# Patient Record
Sex: FEMALE | Race: WHITE | Employment: OTHER | URBAN - METROPOLITAN AREA
[De-identification: names, ages, dates, MRNs, and addresses within clinical notes are randomized per-mention and may not be internally consistent; named-entity substitution may affect disease eponyms.]

---

## 2017-06-26 DIAGNOSIS — L67.8 ABNORMAL FACIAL HAIR: ICD-10-CM

## 2017-06-26 DIAGNOSIS — Z13.220 SCREENING CHOLESTEROL LEVEL: ICD-10-CM

## 2017-06-26 DIAGNOSIS — R23.2 HOT FLASHES: ICD-10-CM

## 2017-06-26 DIAGNOSIS — E55.9 VITAMIN D DEFICIENCY: ICD-10-CM

## 2017-06-26 DIAGNOSIS — R73.01 ABNORMAL FASTING GLUCOSE: ICD-10-CM

## 2017-06-26 DIAGNOSIS — E66.01 MORBID OBESITY WITH BMI OF 40.0-44.9, ADULT (HCC): ICD-10-CM

## 2017-06-26 DIAGNOSIS — R63.5 WEIGHT GAIN: ICD-10-CM

## 2017-06-26 LAB
ALBUMIN SERPL-MCNC: 4.4 G/DL (ref 3.9–4.9)
ALP BLD-CCNC: 87 U/L (ref 40–130)
ALT SERPL-CCNC: 99 U/L (ref 0–33)
ANION GAP SERPL CALCULATED.3IONS-SCNC: 13 MEQ/L (ref 7–13)
AST SERPL-CCNC: 68 U/L (ref 0–35)
BASOPHILS ABSOLUTE: 0.1 K/UL (ref 0–0.2)
BASOPHILS RELATIVE PERCENT: 0.9 %
BILIRUB SERPL-MCNC: 0.4 MG/DL (ref 0–1.2)
BUN BLDV-MCNC: 13 MG/DL (ref 6–20)
CALCIUM SERPL-MCNC: 9.5 MG/DL (ref 8.6–10.2)
CHLORIDE BLD-SCNC: 104 MEQ/L (ref 98–107)
CHOLESTEROL, TOTAL: 102 MG/DL (ref 0–199)
CO2: 25 MEQ/L (ref 22–29)
CORTISOL - AM: 7.2 UG/DL (ref 6.2–19.4)
CREAT SERPL-MCNC: 0.81 MG/DL (ref 0.5–0.9)
EOSINOPHILS ABSOLUTE: 0.2 K/UL (ref 0–0.7)
EOSINOPHILS RELATIVE PERCENT: 2 %
GFR AFRICAN AMERICAN: >60
GFR NON-AFRICAN AMERICAN: >60
GLOBULIN: 2.5 G/DL (ref 2.3–3.5)
GLUCOSE BLD-MCNC: 74 MG/DL (ref 74–109)
HBA1C MFR BLD: 6.2 % (ref 4.8–5.9)
HCT VFR BLD CALC: 46.1 % (ref 37–47)
HDLC SERPL-MCNC: 41 MG/DL (ref 40–59)
HEMOGLOBIN: 15.5 G/DL (ref 12–16)
LDL CHOLESTEROL CALCULATED: 45 MG/DL (ref 0–129)
LYMPHOCYTES ABSOLUTE: 2.7 K/UL (ref 1–4.8)
LYMPHOCYTES RELATIVE PERCENT: 26.4 %
MCH RBC QN AUTO: 30.7 PG (ref 27–31.3)
MCHC RBC AUTO-ENTMCNC: 33.7 % (ref 33–37)
MCV RBC AUTO: 91.4 FL (ref 82–100)
MONOCYTES ABSOLUTE: 0.7 K/UL (ref 0.2–0.8)
MONOCYTES RELATIVE PERCENT: 7.2 %
NEUTROPHILS ABSOLUTE: 6.4 K/UL (ref 1.4–6.5)
NEUTROPHILS RELATIVE PERCENT: 63.5 %
PDW BLD-RTO: 13.7 % (ref 11.5–14.5)
PLATELET # BLD: 259 K/UL (ref 130–400)
POTASSIUM SERPL-SCNC: 4.2 MEQ/L (ref 3.5–5.1)
RBC # BLD: 5.05 M/UL (ref 4.2–5.4)
SODIUM BLD-SCNC: 142 MEQ/L (ref 132–144)
T4 FREE: 0.89 NG/DL (ref 0.93–1.7)
TOTAL PROTEIN: 6.9 G/DL (ref 6.4–8.1)
TRIGL SERPL-MCNC: 82 MG/DL (ref 0–200)
TSH SERPL DL<=0.05 MIU/L-ACNC: 1.69 UIU/ML (ref 0.27–4.2)
VITAMIN D 25-HYDROXY: 15.6 NG/ML (ref 30–100)
WBC # BLD: 10.1 K/UL (ref 4.8–10.8)

## 2017-07-13 ENCOUNTER — OFFICE VISIT (OUTPATIENT)
Dept: OBGYN | Age: 37
End: 2017-07-13

## 2017-07-13 VITALS
HEIGHT: 65 IN | BODY MASS INDEX: 42.65 KG/M2 | DIASTOLIC BLOOD PRESSURE: 88 MMHG | WEIGHT: 256 LBS | SYSTOLIC BLOOD PRESSURE: 126 MMHG

## 2017-07-13 DIAGNOSIS — N94.6 DYSMENORRHEA: ICD-10-CM

## 2017-07-13 DIAGNOSIS — N92.0 MENORRHAGIA WITH REGULAR CYCLE: ICD-10-CM

## 2017-07-13 DIAGNOSIS — Z01.419 WELL WOMAN EXAM WITH ROUTINE GYNECOLOGICAL EXAM: Primary | ICD-10-CM

## 2017-07-13 DIAGNOSIS — Z01.419 PAP SMEAR, AS PART OF ROUTINE GYNECOLOGICAL EXAMINATION: ICD-10-CM

## 2017-07-13 DIAGNOSIS — Z11.51 SCREENING FOR HPV (HUMAN PAPILLOMAVIRUS): ICD-10-CM

## 2017-07-13 LAB
CONTROL: NORMAL
PREGNANCY TEST URINE, POC: NEGATIVE

## 2017-07-13 PROCEDURE — 81025 URINE PREGNANCY TEST: CPT | Performed by: NURSE PRACTITIONER

## 2017-07-13 PROCEDURE — G0101 CA SCREEN;PELVIC/BREAST EXAM: HCPCS | Performed by: NURSE PRACTITIONER

## 2017-07-13 RX ORDER — LEVONORGESTREL / ETHINYL ESTRADIOL AND ETHINYL ESTRADIOL 150-30(84)
1 KIT ORAL DAILY
Qty: 91 TABLET | Refills: 4 | Status: SHIPPED | OUTPATIENT
Start: 2017-07-13 | End: 2019-06-28

## 2017-07-14 LAB — PAP SMEAR: NORMAL

## 2017-07-20 ENCOUNTER — HOSPITAL ENCOUNTER (OUTPATIENT)
Dept: ULTRASOUND IMAGING | Age: 37
Discharge: HOME | End: 2017-07-20
Payer: COMMERCIAL

## 2017-07-20 DIAGNOSIS — R79.89 ABNORMAL THYROID BLOOD TEST: ICD-10-CM

## 2017-07-20 PROCEDURE — 76536 US EXAM OF HEAD AND NECK: CPT

## 2017-07-21 DIAGNOSIS — Z11.51 SCREENING FOR HPV (HUMAN PAPILLOMAVIRUS): ICD-10-CM

## 2017-07-21 DIAGNOSIS — Z01.419 PAP SMEAR, AS PART OF ROUTINE GYNECOLOGICAL EXAMINATION: ICD-10-CM

## 2017-09-04 ENCOUNTER — HOSPITAL ENCOUNTER (EMERGENCY)
Age: 37
Discharge: HOME | End: 2017-09-04
Payer: COMMERCIAL

## 2017-09-04 VITALS
WEIGHT: 260 LBS | RESPIRATION RATE: 18 BRPM | DIASTOLIC BLOOD PRESSURE: 93 MMHG | HEART RATE: 100 BPM | HEIGHT: 66 IN | OXYGEN SATURATION: 98 % | BODY MASS INDEX: 41.78 KG/M2 | SYSTOLIC BLOOD PRESSURE: 152 MMHG | TEMPERATURE: 97.9 F

## 2017-09-04 DIAGNOSIS — J06.9 ACUTE UPPER RESPIRATORY INFECTION: ICD-10-CM

## 2017-09-04 DIAGNOSIS — H65.02 ACUTE SEROUS OTITIS MEDIA OF LEFT EAR, RECURRENCE NOT SPECIFIED: Primary | ICD-10-CM

## 2017-09-04 PROCEDURE — 6370000000 HC RX 637 (ALT 250 FOR IP): Performed by: PHYSICIAN ASSISTANT

## 2017-09-04 PROCEDURE — 6360000002 HC RX W HCPCS: Performed by: PHYSICIAN ASSISTANT

## 2017-09-04 PROCEDURE — 99282 EMERGENCY DEPT VISIT SF MDM: CPT

## 2017-09-04 RX ORDER — AZITHROMYCIN 250 MG/1
TABLET, FILM COATED ORAL
Qty: 1 PACKET | Refills: 0 | Status: SHIPPED | OUTPATIENT
Start: 2017-09-04 | End: 2017-09-14

## 2017-09-04 RX ORDER — AZITHROMYCIN 500 MG/1
500 TABLET, FILM COATED ORAL ONCE
Status: COMPLETED | OUTPATIENT
Start: 2017-09-04 | End: 2017-09-04

## 2017-09-04 RX ORDER — DEXAMETHASONE SODIUM PHOSPHATE 10 MG/ML
10 INJECTION, SOLUTION INTRAMUSCULAR; INTRAVENOUS ONCE
Status: COMPLETED | OUTPATIENT
Start: 2017-09-04 | End: 2017-09-04

## 2017-09-04 RX ORDER — GUAIFENESIN, PSEUDOEPHEDRINE HYDROCHLORIDE 600; 60 MG/1; MG/1
1 TABLET, EXTENDED RELEASE ORAL EVERY 12 HOURS
Qty: 14 TABLET | Refills: 0 | Status: SHIPPED | OUTPATIENT
Start: 2017-09-04 | End: 2017-09-11

## 2017-09-04 RX ADMIN — DEXAMETHASONE SODIUM PHOSPHATE 10 MG: 10 INJECTION INTRAMUSCULAR; INTRAVENOUS at 00:24

## 2017-09-04 RX ADMIN — AZITHROMYCIN 500 MG: 500 TABLET, FILM COATED ORAL at 00:24

## 2017-09-04 ASSESSMENT — ENCOUNTER SYMPTOMS
ALLERGIC/IMMUNOLOGIC NEGATIVE: 1
COUGH: 1
COLOR CHANGE: 0
ABDOMINAL PAIN: 0
RHINORRHEA: 1
DIARRHEA: 0
TROUBLE SWALLOWING: 0
VOMITING: 0
VOICE CHANGE: 0
SORE THROAT: 1
APNEA: 0
EYE PAIN: 0
SHORTNESS OF BREATH: 0
CHEST TIGHTNESS: 0

## 2017-09-04 ASSESSMENT — PAIN DESCRIPTION - ORIENTATION: ORIENTATION: LEFT

## 2017-09-04 ASSESSMENT — PAIN SCALES - GENERAL: PAINLEVEL_OUTOF10: 2

## 2017-09-04 ASSESSMENT — PAIN DESCRIPTION - LOCATION: LOCATION: EAR

## 2017-09-04 ASSESSMENT — PAIN DESCRIPTION - PAIN TYPE: TYPE: ACUTE PAIN

## 2018-03-31 ENCOUNTER — HOSPITAL ENCOUNTER (EMERGENCY)
Age: 38
Discharge: HOME | End: 2018-03-31
Payer: COMMERCIAL

## 2018-03-31 VITALS
TEMPERATURE: 98 F | HEIGHT: 66 IN | HEART RATE: 87 BPM | DIASTOLIC BLOOD PRESSURE: 88 MMHG | RESPIRATION RATE: 18 BRPM | BODY MASS INDEX: 40.18 KG/M2 | SYSTOLIC BLOOD PRESSURE: 157 MMHG | OXYGEN SATURATION: 97 % | WEIGHT: 250 LBS

## 2018-03-31 DIAGNOSIS — S39.012A LUMBOSACRAL STRAIN, INITIAL ENCOUNTER: Primary | ICD-10-CM

## 2018-03-31 PROCEDURE — 99282 EMERGENCY DEPT VISIT SF MDM: CPT

## 2018-03-31 RX ORDER — IBUPROFEN 800 MG/1
800 TABLET ORAL EVERY 6 HOURS PRN
Qty: 120 TABLET | Refills: 1 | Status: SHIPPED | OUTPATIENT
Start: 2018-03-31 | End: 2019-06-28

## 2018-03-31 RX ORDER — CYCLOBENZAPRINE HCL 10 MG
10 TABLET ORAL 3 TIMES DAILY PRN
Qty: 30 TABLET | Refills: 0 | Status: SHIPPED | OUTPATIENT
Start: 2018-03-31 | End: 2018-04-10

## 2018-03-31 ASSESSMENT — ENCOUNTER SYMPTOMS
EYES NEGATIVE: 1
RESPIRATORY NEGATIVE: 1
GASTROINTESTINAL NEGATIVE: 1
BACK PAIN: 1
ALLERGIC/IMMUNOLOGIC NEGATIVE: 1

## 2018-03-31 ASSESSMENT — PAIN SCALES - GENERAL: PAINLEVEL_OUTOF10: 9

## 2018-03-31 ASSESSMENT — PAIN DESCRIPTION - LOCATION: LOCATION: BACK

## 2018-07-18 ENCOUNTER — TELEPHONE (OUTPATIENT)
Dept: OBGYN CLINIC | Age: 38
End: 2018-07-18

## 2018-10-31 RX ORDER — LEVOTHYROXINE SODIUM 0.03 MG/1
25 TABLET ORAL DAILY
Qty: 30 TABLET | Refills: 3 | Status: SHIPPED | OUTPATIENT
Start: 2018-10-31 | End: 2019-06-28 | Stop reason: SDUPTHER

## 2018-10-31 NOTE — TELEPHONE ENCOUNTER
Rx request   Requested Prescriptions     Pending Prescriptions Disp Refills    levothyroxine (SYNTHROID) 25 MCG tablet 30 tablet 3     Sig: Take 1 tablet by mouth daily Empty stomach 30 min before other medications and food     LOV Visit date not found  Next Visit Date:  No future appointments.

## 2019-03-13 DIAGNOSIS — R73.03 PRE-DIABETES: ICD-10-CM

## 2019-03-13 DIAGNOSIS — E66.01 MORBID OBESITY WITH BMI OF 40.0-44.9, ADULT (HCC): ICD-10-CM

## 2019-03-13 DIAGNOSIS — E88.81 INSULIN RESISTANCE: ICD-10-CM

## 2019-03-20 DIAGNOSIS — E88.81 INSULIN RESISTANCE: ICD-10-CM

## 2019-03-20 DIAGNOSIS — R73.03 PRE-DIABETES: ICD-10-CM

## 2019-03-20 DIAGNOSIS — E66.01 MORBID OBESITY WITH BMI OF 40.0-44.9, ADULT (HCC): ICD-10-CM

## 2019-03-20 RX ORDER — LEVOTHYROXINE SODIUM 0.03 MG/1
25 TABLET ORAL DAILY
Qty: 30 TABLET | Refills: 3 | OUTPATIENT
Start: 2019-03-20

## 2019-05-23 RX ORDER — ERGOCALCIFEROL 1.25 MG/1
50000 CAPSULE ORAL WEEKLY
Qty: 16 CAPSULE | Refills: 2 | OUTPATIENT
Start: 2019-05-23

## 2019-05-23 NOTE — TELEPHONE ENCOUNTER
Rx request   Requested Prescriptions     Pending Prescriptions Disp Refills    vitamin D (ERGOCALCIFEROL) 15743 units CAPS capsule 16 capsule 2     Sig: Take 1 capsule by mouth once a week     LOV 7/27/2017  Next Visit Date:  No future appointments.

## 2019-06-28 ENCOUNTER — OFFICE VISIT (OUTPATIENT)
Dept: FAMILY MEDICINE CLINIC | Age: 39
End: 2019-06-28
Payer: COMMERCIAL

## 2019-06-28 VITALS
OXYGEN SATURATION: 98 % | HEIGHT: 66 IN | TEMPERATURE: 97.5 F | SYSTOLIC BLOOD PRESSURE: 118 MMHG | HEART RATE: 90 BPM | BODY MASS INDEX: 40.24 KG/M2 | DIASTOLIC BLOOD PRESSURE: 80 MMHG | RESPIRATION RATE: 16 BRPM | WEIGHT: 250.4 LBS

## 2019-06-28 DIAGNOSIS — Z13.220 NEED FOR LIPID SCREENING: ICD-10-CM

## 2019-06-28 DIAGNOSIS — E55.9 VITAMIN D DEFICIENCY: ICD-10-CM

## 2019-06-28 DIAGNOSIS — G47.00 INSOMNIA, UNSPECIFIED TYPE: ICD-10-CM

## 2019-06-28 DIAGNOSIS — E03.9 HYPOTHYROIDISM, UNSPECIFIED TYPE: ICD-10-CM

## 2019-06-28 DIAGNOSIS — E66.01 MORBID OBESITY WITH BMI OF 40.0-44.9, ADULT (HCC): ICD-10-CM

## 2019-06-28 DIAGNOSIS — R73.03 PRE-DIABETES: ICD-10-CM

## 2019-06-28 DIAGNOSIS — Z00.00 ANNUAL PHYSICAL EXAM: Primary | ICD-10-CM

## 2019-06-28 DIAGNOSIS — E88.81 INSULIN RESISTANCE: ICD-10-CM

## 2019-06-28 PROCEDURE — G8417 CALC BMI ABV UP PARAM F/U: HCPCS | Performed by: NURSE PRACTITIONER

## 2019-06-28 PROCEDURE — 82962 GLUCOSE BLOOD TEST: CPT | Performed by: NURSE PRACTITIONER

## 2019-06-28 PROCEDURE — 1036F TOBACCO NON-USER: CPT | Performed by: NURSE PRACTITIONER

## 2019-06-28 PROCEDURE — G8427 DOCREV CUR MEDS BY ELIG CLIN: HCPCS | Performed by: NURSE PRACTITIONER

## 2019-06-28 PROCEDURE — 99213 OFFICE O/P EST LOW 20 MIN: CPT | Performed by: NURSE PRACTITIONER

## 2019-06-28 RX ORDER — NAPROXEN SODIUM 220 MG
220 TABLET ORAL 2 TIMES DAILY WITH MEALS
Qty: 60 TABLET | Refills: 3 | Status: SHIPPED | OUTPATIENT
Start: 2019-06-28 | End: 2019-09-23

## 2019-06-28 RX ORDER — LEVOTHYROXINE SODIUM 0.03 MG/1
25 TABLET ORAL DAILY
Qty: 30 TABLET | Refills: 3 | Status: SHIPPED | OUTPATIENT
Start: 2019-06-28 | End: 2019-07-02 | Stop reason: SDUPTHER

## 2019-06-28 RX ORDER — TRAZODONE HYDROCHLORIDE 50 MG/1
50 TABLET ORAL NIGHTLY
Qty: 30 TABLET | Refills: 3 | Status: SHIPPED | OUTPATIENT
Start: 2019-06-28 | End: 2019-09-23

## 2019-06-28 RX ORDER — LANCETS 30 GAUGE
EACH MISCELLANEOUS
Qty: 100 EACH | Refills: 3 | Status: SHIPPED | OUTPATIENT
Start: 2019-06-28 | End: 2019-10-09 | Stop reason: SDUPTHER

## 2019-06-28 RX ORDER — GLUCOSAMINE HCL/CHONDROITIN SU 500-400 MG
CAPSULE ORAL
Qty: 100 STRIP | Refills: 3 | Status: SHIPPED | OUTPATIENT
Start: 2019-06-28 | End: 2019-10-09 | Stop reason: SDUPTHER

## 2019-06-28 RX ORDER — UBIQUINOL 100 MG
CAPSULE ORAL
Qty: 100 EACH | Refills: 5 | Status: SHIPPED | OUTPATIENT
Start: 2019-06-28 | End: 2019-10-09 | Stop reason: SDUPTHER

## 2019-06-28 RX ORDER — ERGOCALCIFEROL 1.25 MG/1
50000 CAPSULE ORAL WEEKLY
Qty: 16 CAPSULE | Refills: 2 | Status: SHIPPED | OUTPATIENT
Start: 2019-06-28 | End: 2019-10-09 | Stop reason: SDUPTHER

## 2019-07-01 DIAGNOSIS — R73.03 PRE-DIABETES: ICD-10-CM

## 2019-07-01 DIAGNOSIS — E88.819 INSULIN RESISTANCE: ICD-10-CM

## 2019-07-01 DIAGNOSIS — Z00.00 ANNUAL PHYSICAL EXAM: ICD-10-CM

## 2019-07-01 DIAGNOSIS — E03.9 HYPOTHYROIDISM, UNSPECIFIED TYPE: ICD-10-CM

## 2019-07-01 DIAGNOSIS — Z13.220 NEED FOR LIPID SCREENING: ICD-10-CM

## 2019-07-01 DIAGNOSIS — E55.9 VITAMIN D DEFICIENCY: ICD-10-CM

## 2019-07-01 LAB
ALBUMIN SERPL-MCNC: 4.3 G/DL (ref 3.5–4.6)
ALP BLD-CCNC: 79 U/L (ref 40–130)
ALT SERPL-CCNC: 37 U/L (ref 0–33)
ANION GAP SERPL CALCULATED.3IONS-SCNC: 12 MEQ/L (ref 9–15)
AST SERPL-CCNC: 27 U/L (ref 0–35)
BASOPHILS ABSOLUTE: 0 K/UL (ref 0–0.2)
BASOPHILS RELATIVE PERCENT: 0.4 %
BILIRUB SERPL-MCNC: 0.4 MG/DL (ref 0.2–0.7)
BUN BLDV-MCNC: 12 MG/DL (ref 6–20)
CALCIUM SERPL-MCNC: 9.2 MG/DL (ref 8.5–9.9)
CHLORIDE BLD-SCNC: 106 MEQ/L (ref 95–107)
CHOLESTEROL, FASTING: 80 MG/DL (ref 0–199)
CO2: 27 MEQ/L (ref 20–31)
CREAT SERPL-MCNC: 0.92 MG/DL (ref 0.5–0.9)
EOSINOPHILS ABSOLUTE: 0.1 K/UL (ref 0–0.7)
EOSINOPHILS RELATIVE PERCENT: 1.1 %
GFR AFRICAN AMERICAN: >60
GFR NON-AFRICAN AMERICAN: >60
GLOBULIN: 2.8 G/DL (ref 2.3–3.5)
GLUCOSE BLD-MCNC: 87 MG/DL (ref 70–99)
HBA1C MFR BLD: 5.6 % (ref 4.8–5.9)
HCT VFR BLD CALC: 41.7 % (ref 37–47)
HDLC SERPL-MCNC: 39 MG/DL (ref 40–59)
HEMOGLOBIN: 14.2 G/DL (ref 12–16)
LDL CHOLESTEROL CALCULATED: 32 MG/DL (ref 0–129)
LYMPHOCYTES ABSOLUTE: 2.1 K/UL (ref 1–4.8)
LYMPHOCYTES RELATIVE PERCENT: 27.2 %
MCH RBC QN AUTO: 31.6 PG (ref 27–31.3)
MCHC RBC AUTO-ENTMCNC: 34.1 % (ref 33–37)
MCV RBC AUTO: 92.9 FL (ref 82–100)
MONOCYTES ABSOLUTE: 0.6 K/UL (ref 0.2–0.8)
MONOCYTES RELATIVE PERCENT: 7.2 %
NEUTROPHILS ABSOLUTE: 5 K/UL (ref 1.4–6.5)
NEUTROPHILS RELATIVE PERCENT: 64.1 %
PDW BLD-RTO: 14.1 % (ref 11.5–14.5)
PLATELET # BLD: 267 K/UL (ref 130–400)
POTASSIUM SERPL-SCNC: 4 MEQ/L (ref 3.4–4.9)
RBC # BLD: 4.49 M/UL (ref 4.2–5.4)
SODIUM BLD-SCNC: 145 MEQ/L (ref 135–144)
T4 FREE: 1.08 NG/DL (ref 0.84–1.68)
TOTAL PROTEIN: 7.1 G/DL (ref 6.3–8)
TRIGLYCERIDE, FASTING: 46 MG/DL (ref 0–150)
TSH SERPL DL<=0.05 MIU/L-ACNC: 1.02 UIU/ML (ref 0.44–3.86)
VITAMIN D 25-HYDROXY: 14.1 NG/ML (ref 30–100)
WBC # BLD: 7.8 K/UL (ref 4.8–10.8)

## 2019-07-02 RX ORDER — LEVOTHYROXINE SODIUM 0.03 MG/1
25 TABLET ORAL DAILY
Qty: 30 TABLET | Refills: 3 | Status: SHIPPED | OUTPATIENT
Start: 2019-07-02 | End: 2019-10-09 | Stop reason: SDUPTHER

## 2019-07-03 ASSESSMENT — ENCOUNTER SYMPTOMS
DIARRHEA: 0
CHEST TIGHTNESS: 0
TROUBLE SWALLOWING: 0
SHORTNESS OF BREATH: 0
NAUSEA: 0
VOMITING: 0

## 2019-07-31 ENCOUNTER — OFFICE VISIT (OUTPATIENT)
Dept: FAMILY MEDICINE CLINIC | Age: 39
End: 2019-07-31
Payer: COMMERCIAL

## 2019-07-31 VITALS
HEIGHT: 66 IN | BODY MASS INDEX: 40.47 KG/M2 | OXYGEN SATURATION: 99 % | RESPIRATION RATE: 16 BRPM | SYSTOLIC BLOOD PRESSURE: 120 MMHG | TEMPERATURE: 97.8 F | HEART RATE: 77 BPM | DIASTOLIC BLOOD PRESSURE: 76 MMHG | WEIGHT: 251.8 LBS

## 2019-07-31 DIAGNOSIS — N92.0 MENORRHAGIA WITH REGULAR CYCLE: ICD-10-CM

## 2019-07-31 DIAGNOSIS — G47.19 EXCESSIVE DAYTIME SLEEPINESS: Primary | ICD-10-CM

## 2019-07-31 DIAGNOSIS — G47.00 DIFFICULTY STAYING ASLEEP: ICD-10-CM

## 2019-07-31 DIAGNOSIS — N94.6 DYSMENORRHEA: ICD-10-CM

## 2019-07-31 PROCEDURE — 99213 OFFICE O/P EST LOW 20 MIN: CPT | Performed by: NURSE PRACTITIONER

## 2019-07-31 PROCEDURE — G8427 DOCREV CUR MEDS BY ELIG CLIN: HCPCS | Performed by: NURSE PRACTITIONER

## 2019-07-31 PROCEDURE — G8417 CALC BMI ABV UP PARAM F/U: HCPCS | Performed by: NURSE PRACTITIONER

## 2019-07-31 PROCEDURE — 1036F TOBACCO NON-USER: CPT | Performed by: NURSE PRACTITIONER

## 2019-07-31 RX ORDER — BLOOD-GLUCOSE METER
EACH MISCELLANEOUS
Refills: 0 | COMMUNITY
Start: 2019-06-28 | End: 2019-08-15

## 2019-07-31 RX ORDER — LEVONORGESTREL / ETHINYL ESTRADIOL AND ETHINYL ESTRADIOL 150-30(84)
KIT ORAL
COMMUNITY
Start: 2017-07-13 | End: 2019-09-23

## 2019-07-31 RX ORDER — LANCETS 30 GAUGE
EACH MISCELLANEOUS
COMMUNITY
Start: 2017-07-27 | End: 2019-08-15

## 2019-07-31 RX ORDER — ERGOCALCIFEROL (VITAMIN D2) 1250 MCG
50000 CAPSULE ORAL DAILY
COMMUNITY
Start: 2017-06-27 | End: 2019-07-31

## 2019-07-31 NOTE — PROGRESS NOTES
Medication Dose Route Frequency Provider Last Rate Last Dose    cefTRIAXone (ROCEPHIN) 2 g IVPB in D5W 50ml minibag  2 g Intravenous Once Unknown Provider Result         Past Surgical History:   Procedure Laterality Date    KNEE SURGERY  2016     Family History   Problem Relation Age of Onset    Diabetes Mother     High Blood Pressure Mother     Kidney Disease Mother     Stroke Mother     Diabetes Father     High Blood Pressure Father     Diabetes Sister     High Blood Pressure Sister      Social History     Socioeconomic History    Marital status: Single     Spouse name: Not on file    Number of children: Not on file    Years of education: Not on file    Highest education level: Not on file   Occupational History    Not on file   Social Needs    Financial resource strain: Not on file    Food insecurity:     Worry: Not on file     Inability: Not on file    Transportation needs:     Medical: Not on file     Non-medical: Not on file   Tobacco Use    Smoking status: Never Smoker    Smokeless tobacco: Never Used   Substance and Sexual Activity    Alcohol use: No    Drug use: No    Sexual activity: Never   Lifestyle    Physical activity:     Days per week: Not on file     Minutes per session: Not on file    Stress: Not on file   Relationships    Social connections:     Talks on phone: Not on file     Gets together: Not on file     Attends Spiritism service: Not on file     Active member of club or organization: Not on file     Attends meetings of clubs or organizations: Not on file     Relationship status: Not on file    Intimate partner violence:     Fear of current or ex partner: Not on file     Emotionally abused: Not on file     Physically abused: Not on file     Forced sexual activity: Not on file   Other Topics Concern    Not on file   Social History Narrative    Not on file     Allergies:  Patient has no known allergies.     Review of Systems    Objective:    /76 (Site: Left

## 2019-08-01 ENCOUNTER — HOSPITAL ENCOUNTER (OUTPATIENT)
Dept: SLEEP CENTER | Age: 39
Discharge: HOME | End: 2019-08-03
Admitting: NURSE PRACTITIONER
Payer: COMMERCIAL

## 2019-08-01 PROCEDURE — 95810 POLYSOM 6/> YRS 4/> PARAM: CPT

## 2019-08-01 PROCEDURE — 95810 POLYSOM 6/> YRS 4/> PARAM: CPT | Performed by: INTERNAL MEDICINE

## 2019-08-07 DIAGNOSIS — G47.19 EXCESSIVE DAYTIME SLEEPINESS: ICD-10-CM

## 2019-08-07 DIAGNOSIS — G47.00 DIFFICULTY STAYING ASLEEP: ICD-10-CM

## 2019-08-15 ENCOUNTER — OFFICE VISIT (OUTPATIENT)
Dept: FAMILY MEDICINE CLINIC | Age: 39
End: 2019-08-15
Payer: COMMERCIAL

## 2019-08-15 VITALS
WEIGHT: 258.6 LBS | HEIGHT: 66 IN | RESPIRATION RATE: 16 BRPM | TEMPERATURE: 98.7 F | SYSTOLIC BLOOD PRESSURE: 122 MMHG | OXYGEN SATURATION: 99 % | BODY MASS INDEX: 41.56 KG/M2 | DIASTOLIC BLOOD PRESSURE: 82 MMHG | HEART RATE: 90 BPM

## 2019-08-15 DIAGNOSIS — M54.50 CHRONIC MIDLINE LOW BACK PAIN WITHOUT SCIATICA: ICD-10-CM

## 2019-08-15 DIAGNOSIS — G89.29 CHRONIC MIDLINE LOW BACK PAIN WITHOUT SCIATICA: ICD-10-CM

## 2019-08-15 DIAGNOSIS — N94.6 DYSMENORRHEA: ICD-10-CM

## 2019-08-15 DIAGNOSIS — N92.0 MENORRHAGIA WITH REGULAR CYCLE: ICD-10-CM

## 2019-08-15 DIAGNOSIS — E66.01 CLASS 3 SEVERE OBESITY DUE TO EXCESS CALORIES WITHOUT SERIOUS COMORBIDITY WITH BODY MASS INDEX (BMI) OF 40.0 TO 44.9 IN ADULT (HCC): Primary | ICD-10-CM

## 2019-08-15 DIAGNOSIS — G47.34 OXYGEN DESATURATION DURING SLEEP: ICD-10-CM

## 2019-08-15 DIAGNOSIS — R53.82 CHRONIC FATIGUE: ICD-10-CM

## 2019-08-15 DIAGNOSIS — E88.81 INSULIN RESISTANCE: ICD-10-CM

## 2019-08-15 PROCEDURE — G8428 CUR MEDS NOT DOCUMENT: HCPCS | Performed by: PHYSICIAN ASSISTANT

## 2019-08-15 PROCEDURE — G8417 CALC BMI ABV UP PARAM F/U: HCPCS | Performed by: PHYSICIAN ASSISTANT

## 2019-08-15 PROCEDURE — 1036F TOBACCO NON-USER: CPT | Performed by: PHYSICIAN ASSISTANT

## 2019-08-15 PROCEDURE — 99214 OFFICE O/P EST MOD 30 MIN: CPT | Performed by: PHYSICIAN ASSISTANT

## 2019-08-15 ASSESSMENT — ENCOUNTER SYMPTOMS
ABDOMINAL DISTENTION: 0
DIARRHEA: 0
CHEST TIGHTNESS: 0
ABDOMINAL PAIN: 0
VOMITING: 0
TROUBLE SWALLOWING: 0
NAUSEA: 0
SHORTNESS OF BREATH: 0

## 2019-08-15 ASSESSMENT — PATIENT HEALTH QUESTIONNAIRE - PHQ9
SUM OF ALL RESPONSES TO PHQ QUESTIONS 1-9: 0
SUM OF ALL RESPONSES TO PHQ QUESTIONS 1-9: 0
SUM OF ALL RESPONSES TO PHQ9 QUESTIONS 1 & 2: 0
2. FEELING DOWN, DEPRESSED OR HOPELESS: 0
1. LITTLE INTEREST OR PLEASURE IN DOING THINGS: 0

## 2019-09-11 ENCOUNTER — HOSPITAL ENCOUNTER (EMERGENCY)
Age: 39
Discharge: HOME | End: 2019-09-11
Payer: COMMERCIAL

## 2019-09-11 ENCOUNTER — APPOINTMENT (OUTPATIENT)
Dept: GENERAL RADIOLOGY | Age: 39
End: 2019-09-11
Payer: COMMERCIAL

## 2019-09-11 VITALS
BODY MASS INDEX: 41.14 KG/M2 | SYSTOLIC BLOOD PRESSURE: 119 MMHG | WEIGHT: 256 LBS | TEMPERATURE: 97.2 F | OXYGEN SATURATION: 95 % | DIASTOLIC BLOOD PRESSURE: 78 MMHG | RESPIRATION RATE: 18 BRPM | HEIGHT: 66 IN | HEART RATE: 75 BPM

## 2019-09-11 DIAGNOSIS — R07.89 ATYPICAL CHEST PAIN: Primary | ICD-10-CM

## 2019-09-11 DIAGNOSIS — E86.0 DEHYDRATION: ICD-10-CM

## 2019-09-11 LAB
ALBUMIN SERPL-MCNC: 4.4 G/DL (ref 3.5–4.6)
ALP BLD-CCNC: 77 U/L (ref 40–130)
ALT SERPL-CCNC: 31 U/L (ref 0–33)
ANION GAP SERPL CALCULATED.3IONS-SCNC: 12 MEQ/L (ref 9–15)
AST SERPL-CCNC: 19 U/L (ref 0–35)
BASOPHILS ABSOLUTE: 0.1 K/UL (ref 0–0.2)
BASOPHILS RELATIVE PERCENT: 0.6 %
BILIRUB SERPL-MCNC: 0.3 MG/DL (ref 0.2–0.7)
BUN BLDV-MCNC: 27 MG/DL (ref 6–20)
CALCIUM SERPL-MCNC: 9.2 MG/DL (ref 8.5–9.9)
CHLORIDE BLD-SCNC: 108 MEQ/L (ref 95–107)
CO2: 22 MEQ/L (ref 20–31)
CREAT SERPL-MCNC: 0.85 MG/DL (ref 0.5–0.9)
D DIMER: 0.42 MG/L FEU (ref 0–0.5)
EKG ATRIAL RATE: 82 BPM
EKG P AXIS: 54 DEGREES
EKG P-R INTERVAL: 150 MS
EKG Q-T INTERVAL: 388 MS
EKG QRS DURATION: 90 MS
EKG QTC CALCULATION (BAZETT): 453 MS
EKG R AXIS: 64 DEGREES
EKG T AXIS: 42 DEGREES
EKG VENTRICULAR RATE: 82 BPM
EOSINOPHILS ABSOLUTE: 0.1 K/UL (ref 0–0.7)
EOSINOPHILS RELATIVE PERCENT: 0.7 %
GFR AFRICAN AMERICAN: >60
GFR NON-AFRICAN AMERICAN: >60
GLOBULIN: 3 G/DL (ref 2.3–3.5)
GLUCOSE BLD-MCNC: 117 MG/DL (ref 70–99)
HCT VFR BLD CALC: 44.1 % (ref 37–47)
HEMOGLOBIN: 14.5 G/DL (ref 12–16)
LACTIC ACID: 1.2 MMOL/L (ref 0.5–2.2)
LYMPHOCYTES ABSOLUTE: 2.8 K/UL (ref 1–4.8)
LYMPHOCYTES RELATIVE PERCENT: 27.9 %
MAGNESIUM: 2.1 MG/DL (ref 1.7–2.4)
MCH RBC QN AUTO: 30.9 PG (ref 27–31.3)
MCHC RBC AUTO-ENTMCNC: 33 % (ref 33–37)
MCV RBC AUTO: 93.9 FL (ref 82–100)
MONOCYTES ABSOLUTE: 0.8 K/UL (ref 0.2–0.8)
MONOCYTES RELATIVE PERCENT: 7.7 %
NEUTROPHILS ABSOLUTE: 6.2 K/UL (ref 1.4–6.5)
NEUTROPHILS RELATIVE PERCENT: 63.1 %
PDW BLD-RTO: 13.9 % (ref 11.5–14.5)
PLATELET # BLD: 245 K/UL (ref 130–400)
POTASSIUM SERPL-SCNC: 3.8 MEQ/L (ref 3.4–4.9)
PRO-BNP: 64 PG/ML
RBC # BLD: 4.7 M/UL (ref 4.2–5.4)
SODIUM BLD-SCNC: 142 MEQ/L (ref 135–144)
TOTAL CK: 38 U/L (ref 0–170)
TOTAL CK: 39 U/L (ref 0–170)
TOTAL PROTEIN: 7.4 G/DL (ref 6.3–8)
TROPONIN: <0.01 NG/ML (ref 0–0.01)
TROPONIN: <0.01 NG/ML (ref 0–0.01)
WBC # BLD: 9.9 K/UL (ref 4.8–10.8)

## 2019-09-11 PROCEDURE — 96361 HYDRATE IV INFUSION ADD-ON: CPT

## 2019-09-11 PROCEDURE — 71046 X-RAY EXAM CHEST 2 VIEWS: CPT

## 2019-09-11 PROCEDURE — 82550 ASSAY OF CK (CPK): CPT

## 2019-09-11 PROCEDURE — 83735 ASSAY OF MAGNESIUM: CPT

## 2019-09-11 PROCEDURE — 85025 COMPLETE CBC W/AUTO DIFF WBC: CPT

## 2019-09-11 PROCEDURE — 96375 TX/PRO/DX INJ NEW DRUG ADDON: CPT

## 2019-09-11 PROCEDURE — 2580000003 HC RX 258: Performed by: PHYSICIAN ASSISTANT

## 2019-09-11 PROCEDURE — 94640 AIRWAY INHALATION TREATMENT: CPT

## 2019-09-11 PROCEDURE — 85379 FIBRIN DEGRADATION QUANT: CPT

## 2019-09-11 PROCEDURE — 93005 ELECTROCARDIOGRAM TRACING: CPT | Performed by: PHYSICIAN ASSISTANT

## 2019-09-11 PROCEDURE — 6360000002 HC RX W HCPCS: Performed by: PHYSICIAN ASSISTANT

## 2019-09-11 PROCEDURE — 83880 ASSAY OF NATRIURETIC PEPTIDE: CPT

## 2019-09-11 PROCEDURE — 84484 ASSAY OF TROPONIN QUANT: CPT

## 2019-09-11 PROCEDURE — 6370000000 HC RX 637 (ALT 250 FOR IP): Performed by: PHYSICIAN ASSISTANT

## 2019-09-11 PROCEDURE — 96374 THER/PROPH/DIAG INJ IV PUSH: CPT

## 2019-09-11 PROCEDURE — 99284 EMERGENCY DEPT VISIT MOD MDM: CPT

## 2019-09-11 PROCEDURE — 36415 COLL VENOUS BLD VENIPUNCTURE: CPT

## 2019-09-11 PROCEDURE — 80053 COMPREHEN METABOLIC PANEL: CPT

## 2019-09-11 PROCEDURE — 83605 ASSAY OF LACTIC ACID: CPT

## 2019-09-11 RX ORDER — 0.9 % SODIUM CHLORIDE 0.9 %
1000 INTRAVENOUS SOLUTION INTRAVENOUS ONCE
Status: COMPLETED | OUTPATIENT
Start: 2019-09-11 | End: 2019-09-11

## 2019-09-11 RX ORDER — METHYLPREDNISOLONE SODIUM SUCCINATE 125 MG/2ML
125 INJECTION, POWDER, LYOPHILIZED, FOR SOLUTION INTRAMUSCULAR; INTRAVENOUS ONCE
Status: COMPLETED | OUTPATIENT
Start: 2019-09-11 | End: 2019-09-11

## 2019-09-11 RX ORDER — KETOROLAC TROMETHAMINE 30 MG/ML
30 INJECTION, SOLUTION INTRAMUSCULAR; INTRAVENOUS ONCE
Status: COMPLETED | OUTPATIENT
Start: 2019-09-11 | End: 2019-09-11

## 2019-09-11 RX ORDER — ETODOLAC 400 MG/1
400 TABLET, FILM COATED ORAL 2 TIMES DAILY
Qty: 14 TABLET | Refills: 0 | Status: SHIPPED | OUTPATIENT
Start: 2019-09-11 | End: 2019-09-23

## 2019-09-11 RX ORDER — IPRATROPIUM BROMIDE AND ALBUTEROL SULFATE 2.5; .5 MG/3ML; MG/3ML
1 SOLUTION RESPIRATORY (INHALATION) ONCE
Status: COMPLETED | OUTPATIENT
Start: 2019-09-11 | End: 2019-09-11

## 2019-09-11 RX ADMIN — KETOROLAC TROMETHAMINE 30 MG: 30 INJECTION, SOLUTION INTRAMUSCULAR at 21:16

## 2019-09-11 RX ADMIN — METHYLPREDNISOLONE SODIUM SUCCINATE 125 MG: 125 INJECTION, POWDER, FOR SOLUTION INTRAMUSCULAR; INTRAVENOUS at 21:16

## 2019-09-11 RX ADMIN — SODIUM CHLORIDE 1000 ML: 9 INJECTION, SOLUTION INTRAVENOUS at 21:16

## 2019-09-11 RX ADMIN — IPRATROPIUM BROMIDE AND ALBUTEROL SULFATE 1 AMPULE: .5; 3 SOLUTION RESPIRATORY (INHALATION) at 19:00

## 2019-09-11 ASSESSMENT — ENCOUNTER SYMPTOMS
EYE PAIN: 0
ALLERGIC/IMMUNOLOGIC NEGATIVE: 1
COLOR CHANGE: 0
APNEA: 0
SHORTNESS OF BREATH: 1
CHEST TIGHTNESS: 1
ABDOMINAL PAIN: 0
TROUBLE SWALLOWING: 0

## 2019-09-11 ASSESSMENT — PAIN SCALES - GENERAL: PAINLEVEL_OUTOF10: 6

## 2019-09-11 NOTE — ED PROVIDER NOTES
distress. She has decreased breath sounds in the right middle field, the right lower field, the left middle field and the left lower field. She exhibits tenderness. Abdominal: Soft. Bowel sounds are normal. She exhibits no distension. Musculoskeletal: Normal range of motion. Neurological: She is alert and oriented to person, place, and time. No cranial nerve deficit. She exhibits normal muscle tone. Skin: Skin is warm and dry. No rash noted. She is not diaphoretic. No erythema. Psychiatric: She has a normal mood and affect. Her behavior is normal. Judgment and thought content normal.   Nursing note and vitals reviewed. DIAGNOSTIC RESULTS     EKG: All EKG's are interpreted by the Emergency Department Physician who either signs or Co-signsthis chart in the absence of a cardiologist.    NSR @ 82, no st elevation    RADIOLOGY:   Non-plain filmimages such as CT, Ultrasound and MRI are read by the radiologist. Plain radiographic images are visualized and preliminarily interpreted by the emergency physician with the below findings:    NAD    Interpretation per the Radiologist below, if available at the time ofthis note:    XR CHEST STANDARD (2 VW)    (Results Pending)         ED BEDSIDE ULTRASOUND:   Performed by ED Physician - none    LABS:  Labs Reviewed   COMPREHENSIVE METABOLIC PANEL - Abnormal; Notable for the following components:       Result Value    Chloride 108 (*)     Glucose 117 (*)     BUN 27 (*)     All other components within normal limits   CBC WITH AUTO DIFFERENTIAL   TROPONIN   LACTIC ACID, PLASMA   MAGNESIUM   CK   BRAIN NATRIURETIC PEPTIDE   D-DIMER, QUANTITATIVE   TROPONIN   CK       All other labs were within normal range or not returned as of this dictation.     EMERGENCY DEPARTMENT COURSE and DIFFERENTIAL DIAGNOSIS/MDM:   Vitals:    Vitals:    09/11/19 1835 09/11/19 1900 09/11/19 2007 09/11/19 2200   BP: (!) 151/89  118/86 119/78   Pulse: 88  88 75   Resp: 18 18 21 18   Temp: 97.2

## 2019-09-12 PROCEDURE — 93010 ELECTROCARDIOGRAM REPORT: CPT | Performed by: INTERNAL MEDICINE

## 2019-09-23 ENCOUNTER — OFFICE VISIT (OUTPATIENT)
Dept: FAMILY MEDICINE CLINIC | Age: 39
End: 2019-09-23
Payer: COMMERCIAL

## 2019-09-23 VITALS
TEMPERATURE: 98.3 F | SYSTOLIC BLOOD PRESSURE: 110 MMHG | WEIGHT: 250.3 LBS | BODY MASS INDEX: 40.22 KG/M2 | HEART RATE: 87 BPM | OXYGEN SATURATION: 100 % | RESPIRATION RATE: 16 BRPM | DIASTOLIC BLOOD PRESSURE: 80 MMHG | HEIGHT: 66 IN

## 2019-09-23 DIAGNOSIS — R06.02 SOB (SHORTNESS OF BREATH) ON EXERTION: Primary | ICD-10-CM

## 2019-09-23 PROCEDURE — 1036F TOBACCO NON-USER: CPT | Performed by: PHYSICIAN ASSISTANT

## 2019-09-23 PROCEDURE — G8417 CALC BMI ABV UP PARAM F/U: HCPCS | Performed by: PHYSICIAN ASSISTANT

## 2019-09-23 PROCEDURE — G8427 DOCREV CUR MEDS BY ELIG CLIN: HCPCS | Performed by: PHYSICIAN ASSISTANT

## 2019-09-23 PROCEDURE — 99214 OFFICE O/P EST MOD 30 MIN: CPT | Performed by: PHYSICIAN ASSISTANT

## 2019-09-23 RX ORDER — ALBUTEROL SULFATE 90 UG/1
2 AEROSOL, METERED RESPIRATORY (INHALATION) EVERY 4 HOURS PRN
Qty: 2 INHALER | Refills: 3 | Status: SHIPPED | OUTPATIENT
Start: 2019-09-23 | End: 2019-10-09 | Stop reason: SDUPTHER

## 2019-09-23 ASSESSMENT — ENCOUNTER SYMPTOMS
SHORTNESS OF BREATH: 1
CHEST TIGHTNESS: 1
COUGH: 1
WHEEZING: 0

## 2019-10-07 DIAGNOSIS — R06.02 SOB (SHORTNESS OF BREATH) ON EXERTION: ICD-10-CM

## 2019-10-09 DIAGNOSIS — E88.81 INSULIN RESISTANCE: ICD-10-CM

## 2019-10-09 DIAGNOSIS — R06.02 SOB (SHORTNESS OF BREATH) ON EXERTION: ICD-10-CM

## 2019-10-09 DIAGNOSIS — R73.03 PRE-DIABETES: ICD-10-CM

## 2019-10-09 DIAGNOSIS — E66.01 MORBID OBESITY WITH BMI OF 40.0-44.9, ADULT (HCC): ICD-10-CM

## 2019-10-09 DIAGNOSIS — E55.9 VITAMIN D DEFICIENCY: ICD-10-CM

## 2019-10-09 DIAGNOSIS — E03.9 HYPOTHYROIDISM, UNSPECIFIED TYPE: ICD-10-CM

## 2019-10-09 RX ORDER — ERGOCALCIFEROL 1.25 MG/1
50000 CAPSULE ORAL WEEKLY
Qty: 16 CAPSULE | Refills: 2 | Status: SHIPPED | OUTPATIENT
Start: 2019-10-09 | End: 2020-03-10

## 2019-10-09 RX ORDER — UBIQUINOL 100 MG
CAPSULE ORAL
Qty: 100 EACH | Refills: 5 | Status: SHIPPED | OUTPATIENT
Start: 2019-10-09 | End: 2020-06-11

## 2019-10-09 RX ORDER — GLUCOSAMINE HCL/CHONDROITIN SU 500-400 MG
CAPSULE ORAL
Qty: 100 STRIP | Refills: 3 | Status: SHIPPED | OUTPATIENT
Start: 2019-10-09 | End: 2020-02-13

## 2019-10-09 RX ORDER — LANCETS 30 GAUGE
EACH MISCELLANEOUS
Qty: 100 EACH | Refills: 3 | Status: SHIPPED | OUTPATIENT
Start: 2019-10-09 | End: 2020-05-12

## 2019-10-09 RX ORDER — ALBUTEROL SULFATE 90 UG/1
2 AEROSOL, METERED RESPIRATORY (INHALATION) EVERY 4 HOURS PRN
Qty: 2 INHALER | Refills: 3 | Status: SHIPPED | OUTPATIENT
Start: 2019-10-09 | End: 2020-05-12

## 2019-10-09 RX ORDER — LEVOTHYROXINE SODIUM 0.03 MG/1
25 TABLET ORAL DAILY
Qty: 90 TABLET | Refills: 1 | Status: SHIPPED | OUTPATIENT
Start: 2019-10-09 | End: 2019-12-16 | Stop reason: SDUPTHER

## 2019-10-10 ENCOUNTER — OFFICE VISIT (OUTPATIENT)
Dept: PULMONOLOGY | Age: 39
End: 2019-10-10
Payer: COMMERCIAL

## 2019-10-10 VITALS
BODY MASS INDEX: 40.31 KG/M2 | SYSTOLIC BLOOD PRESSURE: 138 MMHG | RESPIRATION RATE: 16 BRPM | HEART RATE: 72 BPM | TEMPERATURE: 97.5 F | DIASTOLIC BLOOD PRESSURE: 78 MMHG | OXYGEN SATURATION: 98 % | HEIGHT: 66 IN | WEIGHT: 250.8 LBS

## 2019-10-10 DIAGNOSIS — R06.02 SHORTNESS OF BREATH: Primary | ICD-10-CM

## 2019-10-10 PROCEDURE — G8427 DOCREV CUR MEDS BY ELIG CLIN: HCPCS | Performed by: INTERNAL MEDICINE

## 2019-10-10 PROCEDURE — 1036F TOBACCO NON-USER: CPT | Performed by: INTERNAL MEDICINE

## 2019-10-10 PROCEDURE — G8484 FLU IMMUNIZE NO ADMIN: HCPCS | Performed by: INTERNAL MEDICINE

## 2019-10-10 PROCEDURE — 99204 OFFICE O/P NEW MOD 45 MIN: CPT | Performed by: INTERNAL MEDICINE

## 2019-10-10 PROCEDURE — G8417 CALC BMI ABV UP PARAM F/U: HCPCS | Performed by: INTERNAL MEDICINE

## 2019-10-10 ASSESSMENT — ENCOUNTER SYMPTOMS
COUGH: 0
SORE THROAT: 0
DIARRHEA: 0
NAUSEA: 0
SHORTNESS OF BREATH: 1
RHINORRHEA: 0
SINUS PRESSURE: 0
WHEEZING: 0
VOMITING: 0
CHEST TIGHTNESS: 0
ABDOMINAL PAIN: 0

## 2019-10-11 ENCOUNTER — TELEPHONE (OUTPATIENT)
Dept: FAMILY MEDICINE CLINIC | Age: 39
End: 2019-10-11

## 2019-10-11 DIAGNOSIS — E88.81 INSULIN RESISTANCE: ICD-10-CM

## 2019-10-11 DIAGNOSIS — R73.03 PRE-DIABETES: Primary | ICD-10-CM

## 2019-10-11 DIAGNOSIS — R73.03 PRE-DIABETES: ICD-10-CM

## 2019-10-11 RX ORDER — BLOOD-GLUCOSE METER
1 KIT MISCELLANEOUS DAILY
Qty: 1 KIT | Refills: 0 | Status: CANCELLED | OUTPATIENT
Start: 2019-10-11

## 2019-10-11 RX ORDER — BLOOD-GLUCOSE METER
1 KIT MISCELLANEOUS DAILY
Qty: 1 KIT | Refills: 0 | Status: SHIPPED | OUTPATIENT
Start: 2019-10-11 | End: 2021-10-29 | Stop reason: SDUPTHER

## 2019-10-28 ENCOUNTER — HOSPITAL ENCOUNTER (OUTPATIENT)
Dept: NON INVASIVE DIAGNOSTICS | Age: 39
Discharge: HOME | End: 2019-10-28
Payer: COMMERCIAL

## 2019-10-28 DIAGNOSIS — R06.02 SHORTNESS OF BREATH: ICD-10-CM

## 2019-10-28 LAB
LV EF: 60 %
LVEF MODALITY: NORMAL

## 2019-10-28 PROCEDURE — 93306 TTE W/DOPPLER COMPLETE: CPT

## 2019-10-30 ENCOUNTER — TELEPHONE (OUTPATIENT)
Dept: DIABETES SERVICES | Age: 39
End: 2019-10-30

## 2019-10-30 DIAGNOSIS — E88.81 INSULIN RESISTANCE: Primary | ICD-10-CM

## 2019-11-11 ENCOUNTER — HOSPITAL ENCOUNTER (OUTPATIENT)
Dept: DIABETES SERVICES | Age: 39
Setting detail: THERAPIES SERIES
Discharge: HOME | End: 2019-11-11
Payer: COMMERCIAL

## 2019-11-11 VITALS — BODY MASS INDEX: 41.65 KG/M2 | HEIGHT: 65 IN | WEIGHT: 250 LBS

## 2019-11-11 PROCEDURE — G0108 DIAB MANAGE TRN  PER INDIV: HCPCS

## 2019-11-11 SDOH — ECONOMIC STABILITY: FOOD INSECURITY: ADDITIONAL INFORMATION: NO

## 2019-11-11 ASSESSMENT — PATIENT HEALTH QUESTIONNAIRE - PHQ9
SUM OF ALL RESPONSES TO PHQ QUESTIONS 1-9: 0
2. FEELING DOWN, DEPRESSED OR HOPELESS: 0
SUM OF ALL RESPONSES TO PHQ9 QUESTIONS 1 & 2: 0
1. LITTLE INTEREST OR PLEASURE IN DOING THINGS: 0
SUM OF ALL RESPONSES TO PHQ QUESTIONS 1-9: 0

## 2019-11-11 ASSESSMENT — SLEEP AND FATIGUE QUESTIONNAIRES
HOW MANY HOURS OF SLEEP ARE YOU GETTING, ON AVERAGE: 7 OR MORE
HAVE YOU EVER BEEN TESTED FOR SLEEP APNEA: YES
HAVE YOU BEEN TOLD, OR NOTICED ON YOUR OWN, THAT YOU STOP BREATHING OR STRUGGLE TO BREATHE IN YOUR SLEEP: YES
HOW DO YOU RATE THE QUALITY OF YOUR SLEEP: GOOD

## 2019-12-02 ENCOUNTER — OFFICE VISIT (OUTPATIENT)
Dept: PULMONOLOGY | Age: 39
End: 2019-12-02
Payer: COMMERCIAL

## 2019-12-02 VITALS
DIASTOLIC BLOOD PRESSURE: 82 MMHG | OXYGEN SATURATION: 98 % | HEIGHT: 66 IN | RESPIRATION RATE: 16 BRPM | TEMPERATURE: 96.5 F | WEIGHT: 254 LBS | SYSTOLIC BLOOD PRESSURE: 138 MMHG | BODY MASS INDEX: 40.82 KG/M2 | HEART RATE: 63 BPM

## 2019-12-02 DIAGNOSIS — G47.33 OBSTRUCTIVE SLEEP APNEA: Primary | ICD-10-CM

## 2019-12-02 DIAGNOSIS — R06.02 SHORTNESS OF BREATH: ICD-10-CM

## 2019-12-02 PROCEDURE — 1036F TOBACCO NON-USER: CPT | Performed by: INTERNAL MEDICINE

## 2019-12-02 PROCEDURE — G8427 DOCREV CUR MEDS BY ELIG CLIN: HCPCS | Performed by: INTERNAL MEDICINE

## 2019-12-02 PROCEDURE — G8417 CALC BMI ABV UP PARAM F/U: HCPCS | Performed by: INTERNAL MEDICINE

## 2019-12-02 PROCEDURE — G8484 FLU IMMUNIZE NO ADMIN: HCPCS | Performed by: INTERNAL MEDICINE

## 2019-12-02 PROCEDURE — 99214 OFFICE O/P EST MOD 30 MIN: CPT | Performed by: INTERNAL MEDICINE

## 2019-12-02 ASSESSMENT — ENCOUNTER SYMPTOMS
COUGH: 0
SINUS PRESSURE: 0
VOMITING: 0
NAUSEA: 0
DIARRHEA: 0
SHORTNESS OF BREATH: 1
SORE THROAT: 0
WHEEZING: 0
ABDOMINAL PAIN: 0
CHEST TIGHTNESS: 0
RHINORRHEA: 0

## 2019-12-03 ENCOUNTER — HOSPITAL ENCOUNTER (OUTPATIENT)
Dept: DIABETES SERVICES | Age: 39
Setting detail: THERAPIES SERIES
Discharge: HOME | End: 2019-12-03
Payer: COMMERCIAL

## 2019-12-03 PROCEDURE — G0109 DIAB MANAGE TRN IND/GROUP: HCPCS

## 2019-12-04 ENCOUNTER — HOSPITAL ENCOUNTER (OUTPATIENT)
Dept: DIABETES SERVICES | Age: 39
Setting detail: THERAPIES SERIES
Discharge: HOME | End: 2019-12-04
Payer: COMMERCIAL

## 2019-12-04 PROCEDURE — G0109 DIAB MANAGE TRN IND/GROUP: HCPCS

## 2019-12-05 ENCOUNTER — HOSPITAL ENCOUNTER (OUTPATIENT)
Dept: DIABETES SERVICES | Age: 39
Setting detail: THERAPIES SERIES
Discharge: HOME | End: 2019-12-05
Payer: COMMERCIAL

## 2019-12-05 PROCEDURE — G0109 DIAB MANAGE TRN IND/GROUP: HCPCS

## 2019-12-06 ENCOUNTER — HOSPITAL ENCOUNTER (EMERGENCY)
Age: 39
Discharge: HOME | End: 2019-12-06
Payer: COMMERCIAL

## 2019-12-06 ENCOUNTER — APPOINTMENT (OUTPATIENT)
Dept: GENERAL RADIOLOGY | Age: 39
End: 2019-12-06
Payer: COMMERCIAL

## 2019-12-06 VITALS
SYSTOLIC BLOOD PRESSURE: 145 MMHG | RESPIRATION RATE: 18 BRPM | TEMPERATURE: 98.2 F | DIASTOLIC BLOOD PRESSURE: 74 MMHG | BODY MASS INDEX: 42.32 KG/M2 | OXYGEN SATURATION: 100 % | HEIGHT: 65 IN | HEART RATE: 88 BPM | WEIGHT: 254 LBS

## 2019-12-06 DIAGNOSIS — S82.121A CLOSED AVULSION FRACTURE OF LATERAL CONDYLE OF RIGHT TIBIA, INITIAL ENCOUNTER: Primary | ICD-10-CM

## 2019-12-06 PROCEDURE — 73562 X-RAY EXAM OF KNEE 3: CPT

## 2019-12-06 PROCEDURE — 99283 EMERGENCY DEPT VISIT LOW MDM: CPT

## 2019-12-06 PROCEDURE — 6370000000 HC RX 637 (ALT 250 FOR IP): Performed by: PHYSICIAN ASSISTANT

## 2019-12-06 RX ORDER — IBUPROFEN 800 MG/1
800 TABLET ORAL EVERY 8 HOURS PRN
Qty: 20 TABLET | Refills: 0 | Status: SHIPPED | OUTPATIENT
Start: 2019-12-06

## 2019-12-06 RX ORDER — IBUPROFEN 800 MG/1
800 TABLET ORAL ONCE
Status: COMPLETED | OUTPATIENT
Start: 2019-12-06 | End: 2019-12-06

## 2019-12-06 RX ADMIN — IBUPROFEN 800 MG: 800 TABLET, FILM COATED ORAL at 20:22

## 2019-12-06 ASSESSMENT — PAIN DESCRIPTION - DESCRIPTORS: DESCRIPTORS: ACHING

## 2019-12-06 ASSESSMENT — PAIN DESCRIPTION - LOCATION: LOCATION: KNEE

## 2019-12-06 ASSESSMENT — ENCOUNTER SYMPTOMS
SORE THROAT: 0
BACK PAIN: 0
COUGH: 0
NAUSEA: 0
RHINORRHEA: 0
PHOTOPHOBIA: 0
VOMITING: 0
DIARRHEA: 0
ABDOMINAL PAIN: 0
EYE PAIN: 0
SHORTNESS OF BREATH: 0

## 2019-12-06 ASSESSMENT — PAIN SCALES - GENERAL
PAINLEVEL_OUTOF10: 10
PAINLEVEL_OUTOF10: 10

## 2019-12-06 ASSESSMENT — PAIN DESCRIPTION - PAIN TYPE: TYPE: ACUTE PAIN

## 2019-12-06 ASSESSMENT — PAIN DESCRIPTION - ORIENTATION: ORIENTATION: RIGHT

## 2019-12-06 ASSESSMENT — PAIN DESCRIPTION - ONSET: ONSET: SUDDEN

## 2019-12-06 ASSESSMENT — PAIN DESCRIPTION - FREQUENCY: FREQUENCY: CONTINUOUS

## 2019-12-17 ENCOUNTER — HOSPITAL ENCOUNTER (OUTPATIENT)
Dept: SLEEP CENTER | Age: 39
Discharge: HOME | End: 2019-12-19
Payer: COMMERCIAL

## 2019-12-17 PROCEDURE — 95806 SLEEP STUDY UNATT&RESP EFFT: CPT | Performed by: INTERNAL MEDICINE

## 2019-12-17 PROCEDURE — G0399 HOME SLEEP TEST/TYPE 3 PORTA: HCPCS

## 2019-12-30 DIAGNOSIS — G47.33 OBSTRUCTIVE SLEEP APNEA: ICD-10-CM

## 2020-01-20 ENCOUNTER — OFFICE VISIT (OUTPATIENT)
Dept: FAMILY MEDICINE CLINIC | Age: 40
End: 2020-01-20
Payer: COMMERCIAL

## 2020-01-20 ENCOUNTER — TELEPHONE (OUTPATIENT)
Dept: PULMONOLOGY | Age: 40
End: 2020-01-20

## 2020-01-20 VITALS
OXYGEN SATURATION: 98 % | HEART RATE: 94 BPM | DIASTOLIC BLOOD PRESSURE: 84 MMHG | TEMPERATURE: 97.3 F | RESPIRATION RATE: 15 BRPM | SYSTOLIC BLOOD PRESSURE: 126 MMHG | BODY MASS INDEX: 42.65 KG/M2 | WEIGHT: 256 LBS | HEIGHT: 65 IN

## 2020-01-20 PROBLEM — R21 RASH AND NONSPECIFIC SKIN ERUPTION: Status: ACTIVE | Noted: 2020-01-20

## 2020-01-20 PROCEDURE — 1036F TOBACCO NON-USER: CPT | Performed by: NURSE PRACTITIONER

## 2020-01-20 PROCEDURE — 99212 OFFICE O/P EST SF 10 MIN: CPT | Performed by: NURSE PRACTITIONER

## 2020-01-20 PROCEDURE — G8417 CALC BMI ABV UP PARAM F/U: HCPCS | Performed by: NURSE PRACTITIONER

## 2020-01-20 PROCEDURE — G8427 DOCREV CUR MEDS BY ELIG CLIN: HCPCS | Performed by: NURSE PRACTITIONER

## 2020-01-20 PROCEDURE — G8484 FLU IMMUNIZE NO ADMIN: HCPCS | Performed by: NURSE PRACTITIONER

## 2020-01-20 ASSESSMENT — ENCOUNTER SYMPTOMS
SHORTNESS OF BREATH: 0
COUGH: 0
BACK PAIN: 0
PHOTOPHOBIA: 0
DIARRHEA: 0
SORE THROAT: 0
VOMITING: 0
WHEEZING: 0
EYE PAIN: 0
NAUSEA: 0
ABDOMINAL PAIN: 0
EYE REDNESS: 0

## 2020-01-20 ASSESSMENT — PATIENT HEALTH QUESTIONNAIRE - PHQ9
SUM OF ALL RESPONSES TO PHQ9 QUESTIONS 1 & 2: 0
SUM OF ALL RESPONSES TO PHQ QUESTIONS 1-9: 0
SUM OF ALL RESPONSES TO PHQ QUESTIONS 1-9: 0
2. FEELING DOWN, DEPRESSED OR HOPELESS: 0
1. LITTLE INTEREST OR PLEASURE IN DOING THINGS: 0

## 2020-01-20 NOTE — PROGRESS NOTES
Relationship status: Not on file    Intimate partner violence:     Fear of current or ex partner: Not on file     Emotionally abused: Not on file     Physically abused: Not on file     Forced sexual activity: Not on file   Other Topics Concern    Not on file   Social History Narrative    Not on file     Current Outpatient Medications on File Prior to Visit   Medication Sig Dispense Refill    levothyroxine (SYNTHROID) 25 MCG tablet TAKE 1 TABLET BY MOUTH DAILY EMPTY STOMACH 30 MIN BEFORE OTHER MEDICATIONS AND FOOD 90 tablet 1    ibuprofen (ADVIL;MOTRIN) 800 MG tablet Take 1 tablet by mouth every 8 hours as needed for Pain 20 tablet 0    glucose monitoring kit (FREESTYLE) monitoring kit 1 kit by Does not apply route daily 1 kit 0    metFORMIN (GLUCOPHAGE) 500 MG tablet Take 1/2 tablet with breakfast, 1/2 tablet with dinner 90 tablet 1    blood glucose monitor strips Test two times a day & as needed for symptoms of irregular blood glucose. 100 strip 3    Alcohol Swabs (ALCOHOL PREP) 70 % PADS Use as directed. 100 each 5    vitamin D (ERGOCALCIFEROL) 07960 units CAPS capsule Take 1 capsule by mouth once a week 16 capsule 2    albuterol sulfate  (90 Base) MCG/ACT inhaler Inhale 2 puffs into the lungs every 4 hours as needed for Wheezing or Shortness of Breath 2 Inhaler 3    Lancets MISC Use as directed. 100 each 3    Incontinence Supply Disposable (ATTENDS BRIEFS LARGE) MISC Use monthly, 2-3 briefs daily for menorrhagia. 24 Bottle 5     Current Facility-Administered Medications on File Prior to Visit   Medication Dose Route Frequency Provider Last Rate Last Dose    cefTRIAXone (ROCEPHIN) 2 g IVPB in D5W 50ml minibag  2 g Intravenous Once Unknown Provider Result         :  Patient has no known allergies. Review of Systems   Constitutional: Negative for chills, diaphoresis and fever. HENT: Negative for congestion, sore throat and tinnitus. Eyes: Negative for photophobia, pain and redness. Respiratory: Negative for cough, shortness of breath and wheezing. Cardiovascular: Negative for chest pain, palpitations and leg swelling. Gastrointestinal: Negative for abdominal pain, diarrhea, nausea and vomiting. Genitourinary: Negative for dysuria, flank pain, frequency and urgency. Musculoskeletal: Negative for back pain and myalgias. Skin: Positive for rash. Neurological: Negative for dizziness, tremors, seizures, weakness and headaches. Hematological: Does not bruise/bleed easily. Psychiatric/Behavioral: The patient is not nervous/anxious. Objective:   /84   Pulse 94   Temp 97.3 °F (36.3 °C) (Temporal)   Resp 15   Ht 5' 5\" (1.651 m)   Wt 256 lb (116.1 kg)   SpO2 98%   BMI 42.60 kg/m²     Physical Exam  Constitutional:       Appearance: She is well-developed. HENT:      Head: Normocephalic and atraumatic. Eyes:      Pupils: Pupils are equal, round, and reactive to light. Neck:      Thyroid: No thyromegaly. Trachea: No tracheal deviation. Cardiovascular:      Rate and Rhythm: Normal rate and regular rhythm. Heart sounds: Normal heart sounds. No murmur. No gallop. Pulmonary:      Effort: Pulmonary effort is normal.      Breath sounds: Normal breath sounds. No wheezing or rales. Chest:      Chest wall: No tenderness. Abdominal:      General: Bowel sounds are normal. There is no distension. Palpations: Abdomen is soft. There is no mass. Tenderness: There is no tenderness. There is no guarding or rebound. Musculoskeletal: Normal range of motion. General: No tenderness. Lymphadenopathy:      Cervical: No cervical adenopathy. Skin:     General: Skin is warm and dry. Findings: Rash present. No erythema, laceration or petechiae. Rash is scaling. Comments: About 8 ( 4 on each side- dime size healed lesions     Neurological:      Mental Status: She is alert and oriented to person, place, and time.       Coordination:

## 2020-01-21 NOTE — TELEPHONE ENCOUNTER
Sleep study showed mild sleep apnea, but not enough events to qualify her for CPAP, oxygen was low during sleep mainly because of that and she does qualify for oxygen therapy at night at 2 L, this can be discussed on follow-up otherwise we can initiate oxygen therapy now

## 2020-04-29 ENCOUNTER — TELEPHONE (OUTPATIENT)
Dept: DIABETES SERVICES | Age: 40
End: 2020-04-29

## 2020-06-12 RX ORDER — LANCETS 28 GAUGE
EACH MISCELLANEOUS
Qty: 100 EACH | Refills: 10 | Status: SHIPPED | OUTPATIENT
Start: 2020-06-12 | End: 2020-10-23 | Stop reason: SDUPTHER

## 2020-06-12 RX ORDER — ISOPROPYL ALCOHOL 70 ML/100ML
SWAB TOPICAL
Qty: 100 EACH | Refills: 10 | Status: SHIPPED | OUTPATIENT
Start: 2020-06-12 | End: 2021-05-04

## 2020-06-16 ENCOUNTER — VIRTUAL VISIT (OUTPATIENT)
Dept: PULMONOLOGY | Age: 40
End: 2020-06-16
Payer: COMMERCIAL

## 2020-06-16 PROCEDURE — G8428 CUR MEDS NOT DOCUMENT: HCPCS | Performed by: INTERNAL MEDICINE

## 2020-06-16 PROCEDURE — 99213 OFFICE O/P EST LOW 20 MIN: CPT | Performed by: INTERNAL MEDICINE

## 2020-06-16 PROCEDURE — 1036F TOBACCO NON-USER: CPT | Performed by: INTERNAL MEDICINE

## 2020-06-16 PROCEDURE — G8417 CALC BMI ABV UP PARAM F/U: HCPCS | Performed by: INTERNAL MEDICINE

## 2020-06-16 ASSESSMENT — ENCOUNTER SYMPTOMS
NAUSEA: 0
RHINORRHEA: 0
CHEST TIGHTNESS: 0
SINUS PRESSURE: 0
SHORTNESS OF BREATH: 1
DIARRHEA: 0
ROS SKIN COMMENTS: HIRSUTISM
VOMITING: 0
ABDOMINAL PAIN: 0
WHEEZING: 0
COUGH: 0
SORE THROAT: 0

## 2020-06-16 NOTE — PROGRESS NOTES
Subjective:     Vernon Andino is a 46 y.o. female who complains today of:     Chief Complaint   Patient presents with    Shortness of Breath       HPI  Patient and/or health care decision maker is aware that that he/she may receive a bill for this telephone service, depending on his insurance coverage, and has provided verbal consent to proceed. This visit was completed via telephone. Total time 15 minutes. This is a follow-up telehealth evaluation on this patient with history of shortness of breath and suspected sleep apnea. Since the last visit she had a home sleep testing done which showed mild obstructive sleep apnea but inadequate AHI at 4.1, to qualify her for positive airway pressure therapy. Unfortunately she had a total of 38 events for the night and hours of the study do not necessarily correlate with hours of sleep as it is home testing. But would not allow insurance coverage nevertheless. She had mild oxygen desaturation for just over 8 minutes. She continues to report exertional dyspnea, reports of bluish discoloration of the lips suddenly and periodically for no good reason. Allergies:  Patient has no known allergies.   Past Medical History:   Diagnosis Date    Depression     Diabetes mellitus ([address]     Thyroid disease 2017     Past Surgical History:   Procedure Laterality Date    KNEE SURGERY  2016     Family History   Problem Relation Age of Onset    Diabetes Mother     High Blood Pressure Mother     Kidney Disease Mother     Stroke Mother     Diabetes Father     High Blood Pressure Father     Diabetes Sister     High Blood Pressure Sister      Social History     Socioeconomic History    Marital status: Single     Spouse name: Not on file    Number of children: Not on file    Years of education: Not on file    Highest education level: Not on file   Occupational History    Not on file   Social Needs    Financial resource strain: Not on file    Food insecurity light-headedness and headaches. Psychiatric/Behavioral: Positive for sleep disturbance. Negative for behavioral problems.         :   There were no vitals filed for this visit. Physical Exam        Assessment:      Diagnosis Orders   1. Obstructive sleep apnea     2. Shortness of breath  Full PFT Study With Bronchodilator    Pulse oximetry, overnight   3. Excessive daytime sleepiness       I discussed the results of her sleep testing today at length. Will obtain nocturnal oximetry testing to see if we can qualify her for oxygen therapy at night, also obtain PFTs to investigate any other causes of her persistent dyspnea and possible periodic hypoxia during the day      Plan:     Orders Placed This Encounter   Procedures    Pulse oximetry, overnight     Standing Status:   Future     Standing Expiration Date:   7/16/2020    Full PFT Study With Bronchodilator     Standing Status:   Future     Standing Expiration Date:   7/16/2020     No orders of the defined types were placed in this encounter. Return in about 3 months (around 9/16/2020) for after PFTs, Overnight pulse oximetry.       Nitesh Alvarez MD

## 2020-07-07 ENCOUNTER — TELEPHONE (OUTPATIENT)
Dept: DIABETES SERVICES | Age: 40
End: 2020-07-07

## 2020-07-27 ENCOUNTER — TELEPHONE (OUTPATIENT)
Dept: DIABETES SERVICES | Age: 40
End: 2020-07-27

## 2020-07-27 NOTE — PROGRESS NOTES
Diabetes Self-Management Education Follow-Up    Name:  Isabela Recinos   :  1980     Follow-Up Date:  2020     Education completed on:  2020    Goal:  Lose weight  How often did you meet your goal?     []All the time (5)   []Most of the time (4)   [x]Half the time (3)   []Occasionally (2)    []Never (1)  Modify goal: hasn't weighed herself lately and hasn't been to the dr    Goal:  Reduce portion size - my plate method  How often did you meet your goal?     []All the time (5)   [x]Most of the time (4)   []Half the time (3)   []Occasionally (2)    []Never (1)    FBS: 117  Date: 2020 FBS: 86  Date: 2020  Wt:   250  Date: 2020   A1C: 5.6  Date: 2020     Physician Appointment (date of most recent or next scheduled): Dr Ben Rawls 2020  Recent health problems or change in diabetes medications: denies    Do you know the amount of carbohydrates you eat per meal?   [x]Yes   []No   Frequency of self-foot exam:   [x]Daily  []Other   Eye exam completed? [x]Yes   []No  How many times a day do you check your blood sugar? [x]1   []2   []3   []4   []more    Feelings/Attitudes/Other questions: Reports doing really well since attending education and denies any needs. Will call in the future with any needs. Diabetes Self- Management Education Record    Participant Name: Isabela Recinos  Referring Provider: Shaneka Willams PA-C   Assessment/Evaluation Ratings:  1=Needs Instruction   4=Demonstrates Understanding/Competency  2=Needs Review   NC=Not Covered    3=Comprehends Key Points  N/A=Not Applicable    Topics/Learning Objectives Initial Assessment Date:   Instr. Date Reinforce Date Post- session Eval Comments   Diabetes disease process & Treatment process: Define diabetes & pre-diabetes; Identify own type of diabetes; role of the pancreas; signs/symptoms; diagnostic criteria; prevention & treatment options; contributing factors.  (1) 19 Macey Cervantes RN  19 Terrence Gordon RN   (3) 07/27/20 Terrence Gordon RN  11/11/19 Has had DM for 3 years per patient. Aware she has type 2 DM when asked. Both parents had DM. Eager to learn and prevent problems. Terrence Gordon RN     12/03/19 Discussed basic pathophysiology of DM with the group including the pancreas, insulin, insulin resistance and livers involvement. Reviewed the different types of DM, risk factors, diagnosis, symptoms and the treatment options. Terrence Gordon RN           Incorporating nutritional management into lifestyle: Describe effect of type, amount & timing of food on blood glucose; Describe basic meal planning techniques & current nutrition guideline   (1) 11/11/19 Terrence Gordon RN  12/4/19  WHIT Dumont  (3) 07/27/20 Terrence Gordon RN  11/11/19 Reviewed the importance of eating a balanced diet including portion control and still eating carbohydrates. Terrence Gordon RN     12/4/19 What to eat - Food groups, When to eat - timing of meals and snacks, and How much to eat - portions control. WHIT Dumont     1800 calories/ day   4-5 CHO choices/ meal   12-16 CHO choices/  day   grams of protein /day   gram of fat /day     Correctly read food labels & demonstrate CHO counting & portion control with personalized meal plan. Identify dining out strategies, & dietary sick day guidelines. (1) 11/11/19 Terrence Gordon RN  12/4/19  WHIT Dumont  (3) 07/27/20 Terrence Gordon RN  11/11/19  Importance of label reading reviewed with patient including focus on total carbohydrates and not just sugar content as well as serving size. Terrence Gordon RN     12/4/19 Instructed on carb counting, carb containing foods, and importance of healthy well balanced eating.   WHIT Dumont     Incorporating physical activity into lifestyle:   Verbalize effect of exercise on blood glucose levels; benefits of regular exercise; safety considerations; contraindications; maintenance of activity. (1) 11/11/19 Tristen Marrero RN  12/03/19  Tristen Marrero RN   (3) 07/27/20 Tristen Marrero RN  11/11/19 Walks for activity. Tristen Marrero RN     12/03/19 - Reviewed with the group the importance of exercise, recommendations by the ADA, effects on BG, weight loss and precautions. Discussed what patient is doing to stay active with the group. Tristen Marrero RN       Using medications safely:  Identify effects of diabetes medicines on blood glucose levels; List diabetes medication taken, action & side effects;    (1) 11/11/19 Tristen Marrero RN  12/05/19  Morgan Lozano RN    (3) 07/27/20 Tristen Marrero RN  11/11/19 Takes 1/2 metformin AM and PM. Tristen Marrero RN     12/05/19 - Discussed all medication classes with group and modes of action including insulin and other injectables. Patient aware of BG medication they are taking, side effects and mode of action. Morgan Lozano RN     Insulin / Injectable - Appropriate injection sites; proper storage; supplies needed; proper technique; safe needle disposal guidelines. (1) 11/11/19 Tristen Marrero RN  12/05/19  Morgan Lozano RN    (3) 07/27/20 Tristen Marrero RN  12/05/19 - Discussed insulin stigma and proper technique including site selection and self injection. Reviewed both pen and vial/syringe use. Discussed needle disposal and proper insulin storage. Morgan Lozano RN     Monitoring blood glucose, interpreting and using results:  Identify recommended & personal blood glucose targets; importance of testing; testing supplies; HgbA1C target levels; Factors affecting blood glucose; Importance of logging blood glucose levels for pattern recognition; ketone testing; safe lancet disposal.   (1) 11/11/19 Tristen Marrero RN  12/03/19  Tristen Marrero RN   (3) 07/27/20 Tristen Marrero RN  11/11/19 Checking BG in AM fasting and if she feels low throughout the day. Reviewed BG targets with patient and low BG.  Also discussed A1C with patient. Malia Willoughby RN     12/3/19 - Discussed timing of monitoring and goals for BG. Aware of goal A1C and current A1C. BG guidelines reviewed and rule of 15 to treat low BG. Reviewed the importance of monitoring at different times and occasionally post prandial. Encouraged to keep a log book. Malia Willoughby RN               Prevention, detection & treatment of acute complications:  Identify symptoms of hyper & hypoglycemia, and prevention & treatment strategies. (1) 11/11/19 Malia Willoughby RN  12/03/19  Malia Willoughby RN   (3) 07/27/20 Malia Willoughby RN  11/11/19 - Patient able to recognize signs and symptoms of high and low BG. Reviewed treatment of both high and low BG. Malia Willoughby RN    12/03/19 - Patient able to recognize signs and symptoms of high and low BG. Reviewed treatment of both high and low BG. Discussed with the group causes of both high and low BG. Reviewed BG guidelines and troubleshooting unexpected numbers. Malia Willoughby RN   Describe sick day guidelines & indications for physician notification. Identify short term consequences of poor control. (1) 11/11/19 Malia Willoughby RN  12/05/19  Shireen Mccarthy RN    (3) 07/27/20 Malia Willoughby RN  12/05/19 - Reviewed sick days with group and what to do when sick. Including continuing medications, tips for staying hydrated and when to call the doctor. Handout given. Shireen Mccarthy RN     Prevention, detection & treatment of chronic complications:  Define the natural course of diabetes & describe the relationship of blood glucose levels to long term complications of diabetes. Identify preventative measures & standards of care. (1) 11/11/19 Malia Willoughby RN  12/05/19  Shireen Mccarthy RN    (3) 07/27/20 Malia Willoughby RN  11/11/19 Aware that DM can cause a lot of complications.  Malia Willoughby RN     12/05/19 - Reviewed natural course of T2DM with group and chronic complications related to elevated BG. Discussed chronic complications and how to prevent them from happening. Reviewed patient's ABC's with each patient and provided with ways to reach goal numbers. Brian Uribe RN     Developing strategies to address psychosocial issues:  Describe feelings about living with diabetes; Describe how stress, depression & anxiety affect blood glucose; Identify coping strategies; Identify support needed & support network available. (1) 11/11/19 Alfa Rodriguez RN  12/03/19  Alfa Rodriguez RN   (3) 07/27/20 Alfa Rodriguez RN  12/03/19 - Reviewed healthy coping and unhealthy coping with the group. Discussed what ways of coping each person usually uses and brainstormed ways to change to a healthy coping mechanism with the group. Stressed the importance of stress reduction and the negative effects of stress on the body including elevated BG. Community resources and support networks reviewed and handout provided. Alfa Rodriguez RN       Developing strategies to promote health/change behavior: Identify 7 self-care behaviors; Personal health risk factors; Benefits, challenges & strategies for behavioral change;    (1) 11/11/19 Alfa Rodriguez RN  12/05/19  Brian Uribe RN    (3) 07/27/20 Alfa Rodriguez RN  12/05/19 - Discussed willingness to change behaviors and allowed patient to select goal to work on. Willing to change. Goals set for behavior change and follow up scheduled. Brian Uribe RN       Individualized goal selection. My goal , to help me improve my health, I will:     1. 11/11/19 Lose weight by using plate method for portion control. Alfa Rodriguez RN       12/03/19 75% using plate method for portion control. Alfa Rodriguez RN     07/27/20 75% reducing portions. Alfa Rodriguez RN        Plan  Follow-up as needed      Encounter Type Date Start Time End Time Comments No Show Dates   Assessment 11/11/19  Alfa Rodriguez RN  1400 1500 Attentive.  Attended with friend who also has DM. Good support for each other. Has basic knowledge. Last A1C improved to 5. 6! Would like to lose 50lbs. Has difficulty with reading. Class 1 - Understanding diabetes 12/03/19  Gladys Mckeon RN  1300 1500  Attentive and good group participation. Seems eager to learn and motivated. Here with friend who also had DM and is a good support system. Class 2- Nutrition and diabetes   12/4/19  Iliana Pate RDN LD [id number] Attentive in class; attending with her friend. Has good group input. Class 3 - Preventing Complications 43/65/86  Waqas Mosley RN   1300 1600  Attentive but quiet. Participated when asked.      Individual MNT         Follow-up 07/27/20  Gladys Mckeon RN  2:36 PM      []In Person  [x]Telephone    Meter Instrx        Insulin Instrx      []Pen  []Vial & Syringe      DSMS Support Plan:  Follow-up plan:     [] MNT referral request / Appointment     [] Annual update referral request and appointment after      []ADA  Where do I Begin, Living with Type 2 Diabetes ADA home support program     [] [address] [place] [address] [phone]     [] Fit Walks : FREE [facility] or [facility]    []  Diabetes support Group      []   Emotional Support      [] Anita on phone      []  Internet web sites - ADA and D- life    []  Journals/Magazines    []  Other ___________________________      Post Education Referrals:      [] [address] [place] [address] information sheet and [address] [facility] , [address] [phone]      [] Dental care    [] Podiatrist     []  Opthamologist      []Other        Gladys Mckeon RN

## 2020-09-15 ENCOUNTER — TELEPHONE (OUTPATIENT)
Dept: FAMILY MEDICINE CLINIC | Age: 40
End: 2020-09-15

## 2020-09-25 ENCOUNTER — OFFICE VISIT (OUTPATIENT)
Dept: FAMILY MEDICINE CLINIC | Age: 40
End: 2020-09-25
Payer: COMMERCIAL

## 2020-09-25 VITALS
HEART RATE: 95 BPM | DIASTOLIC BLOOD PRESSURE: 80 MMHG | WEIGHT: 252 LBS | SYSTOLIC BLOOD PRESSURE: 132 MMHG | BODY MASS INDEX: 41.93 KG/M2 | OXYGEN SATURATION: 97 %

## 2020-09-25 PROBLEM — R21 RASH AND NONSPECIFIC SKIN ERUPTION: Status: RESOLVED | Noted: 2020-01-20 | Resolved: 2020-09-25

## 2020-09-25 PROBLEM — E55.9 VITAMIN D DEFICIENCY: Status: ACTIVE | Noted: 2020-09-25

## 2020-09-25 PROBLEM — E03.9 HYPOTHYROIDISM: Status: ACTIVE | Noted: 2020-09-25

## 2020-09-25 PROBLEM — R73.03 PRE-DIABETES: Status: ACTIVE | Noted: 2020-09-25

## 2020-09-25 LAB
CHP ED QC CHECK: NORMAL
GLUCOSE BLD-MCNC: 44 MG/DL

## 2020-09-25 PROCEDURE — 1036F TOBACCO NON-USER: CPT | Performed by: PHYSICIAN ASSISTANT

## 2020-09-25 PROCEDURE — 82962 GLUCOSE BLOOD TEST: CPT | Performed by: PHYSICIAN ASSISTANT

## 2020-09-25 PROCEDURE — 99214 OFFICE O/P EST MOD 30 MIN: CPT | Performed by: PHYSICIAN ASSISTANT

## 2020-09-25 PROCEDURE — G8427 DOCREV CUR MEDS BY ELIG CLIN: HCPCS | Performed by: PHYSICIAN ASSISTANT

## 2020-09-25 PROCEDURE — G8417 CALC BMI ABV UP PARAM F/U: HCPCS | Performed by: PHYSICIAN ASSISTANT

## 2020-09-25 RX ORDER — DIAPER,BRIEF,ADULT, DISPOSABLE
EACH MISCELLANEOUS
Qty: 36 EACH | Refills: 5 | Status: SHIPPED | OUTPATIENT
Start: 2020-09-25 | End: 2020-10-20 | Stop reason: SDUPTHER

## 2020-09-25 ASSESSMENT — PATIENT HEALTH QUESTIONNAIRE - PHQ9
2. FEELING DOWN, DEPRESSED OR HOPELESS: 0
SUM OF ALL RESPONSES TO PHQ9 QUESTIONS 1 & 2: 0
SUM OF ALL RESPONSES TO PHQ QUESTIONS 1-9: 0
SUM OF ALL RESPONSES TO PHQ QUESTIONS 1-9: 0
1. LITTLE INTEREST OR PLEASURE IN DOING THINGS: 0

## 2020-09-25 ASSESSMENT — ENCOUNTER SYMPTOMS
NAUSEA: 0
CHEST TIGHTNESS: 0
DIARRHEA: 0
WHEEZING: 0
ABDOMINAL PAIN: 0
COUGH: 0
TROUBLE SWALLOWING: 0
ABDOMINAL DISTENTION: 0
SHORTNESS OF BREATH: 0
VOMITING: 0

## 2020-09-25 NOTE — PROGRESS NOTES
Subjective  Michael Malik, 45 y.o. female presents today with:  Chief Complaint   Patient presents with    Diabetes     follow up    Menorrhagia     worsening. HPI  Richard May is in the office today for follow up. Last OV with me: 9/23/2019  DUE FOR ROUTINE LABS. Menorrhagia. History of menorrhagia with monthly bleeding. LMP: 9/2/2020--still bleeding. Would like to discuss how heavy and painful her monthly periods are. They routinely every 28-30 days. Will wear at least 2 pads at time due to clots and heavy flow. Will not leave her house, at times. This was discussed a couple of years ago; transvaginal u/s completed--per patient and showed fibroids. Per Richard May, she was started on OCP which did not help. Would like new referral for OB/GYN. Prediabetes. Managed with PRN use of metformin. Denies episodes of hypoglycemia. Asthma. Controlled. Takes proair PRN. Vit D def. On replacement therapy. Due to have lab monitoring. Hypothyroidism. Taking 25 mcg daily. Due for TSH monitoring. Obesity. No significant weight change. Trying to alter portion sizes. Review of Systems   Constitutional: Positive for fatigue. Negative for activity change, appetite change, chills, diaphoresis, fever and unexpected weight change. HENT: Negative for nosebleeds and trouble swallowing. Eyes: Negative for visual disturbance. Respiratory: Negative for cough, chest tightness, shortness of breath and wheezing. Cardiovascular: Negative for chest pain and palpitations. Gastrointestinal: Negative for abdominal distention, abdominal pain, diarrhea, nausea and vomiting. Endocrine: Negative for cold intolerance, heat intolerance, polydipsia, polyphagia and polyuria. Genitourinary: Positive for menstrual problem and pelvic pain. Negative for difficulty urinating, dysuria, flank pain, frequency, vaginal bleeding, vaginal discharge and vaginal pain.    Neurological: Negative for dizziness, tremors, seizures, syncope, facial asymmetry, speech difficulty, weakness, numbness and headaches. Hematological: Does not bruise/bleed easily. Psychiatric/Behavioral: Negative for agitation, confusion, dysphoric mood and sleep disturbance. The patient is not nervous/anxious.       Past Medical History:   Diagnosis Date    Depression     Diabetes mellitus ([facility] [address]     Thyroid disease 2017     Past Surgical History:   Procedure Laterality Date    KNEE SURGERY  2016     Social History     Socioeconomic History    Marital status: Single     Spouse name: Not on file    Number of children: Not on file    Years of education: Not on file    Highest education level: Not on file   Occupational History    Not on file   Social Needs    Financial resource strain: Not on file    Food insecurity     Worry: Not on file     Inability: Not on file    Transportation needs     Medical: Not on file     Non-medical: Not on file   Tobacco Use    Smoking status: Never Smoker    Smokeless tobacco: Never Used   Substance and Sexual Activity    Alcohol use: No    Drug use: No    Sexual activity: Never   Lifestyle    Physical activity     Days per week: Not on file     Minutes per session: Not on file    Stress: Not on file   Relationships    Social connections     Talks on phone: Not on file     Gets together: Not on file     Attends Sabianist service: Not on file     Active member of club or organization: Not on file     Attends meetings of clubs or organizations: Not on file     Relationship status: Not on file    Intimate partner violence     Fear of current or ex partner: Not on file     Emotionally abused: Not on file     Physically abused: Not on file     Forced sexual activity: Not on file   Other Topics Concern    Not on file   Social History Narrative    Not on file     Family History   Problem Relation Age of Onset    Diabetes Mother     High Blood Pressure Mother     Kidney Disease Mother    Dari Serrano Stroke Mother     Diabetes Father     High Blood Pressure Father     Diabetes Sister     High Blood Pressure Sister      No Known Allergies  Current Outpatient Medications   Medication Sig Dispense Refill    Incontinence Supply Disposable (WINGS ADULT BRIEFS XXL) MISC Wear for heavy menstrual bleeding, monthly. Change as needed. 36 each 5    Alcohol Swabs (EASY TOUCH ALCOHOL PREP MEDIUM) 70 % PADS USE AS DIRECTED. 100 each 10    FreeStyle Lancets MISC USE TO TEST BLOOD SUGAR DAILY 100 each 10    PROAIR  (90 Base) MCG/ACT inhaler INHALE 2 PUFFS BY MOUTH 4 HOURS AS NEEDED FOR WHEEZING OR SHORTNESS OF BREATH 17 g 10    metFORMIN (GLUCOPHAGE) 500 MG tablet TAKE 1/2 TABLET BY MOUTH WITH BREAKFAST, AND 1/2 TABLET WITH DINNER 90 tablet 1    levothyroxine (SYNTHROID) 25 MCG tablet TAKE 1 TABLET BY MOUTH DAILY EMPTY STOMACH 30 MIN BEFORE OTHER MEDICATIONS AND FOOD 90 tablet 1    vitamin D (ERGOCALCIFEROL) 1.25 MG (60493 UT) CAPS capsule TAKE ONE CAPSULE BY MOUTH ONE TIME PER WEEK 16 capsule 2    blood glucose test strips (FREESTYLE LITE) strip USE TO TEST BLOOD SUGAR TWICE DAILY & AS NEEDED FOR SYMPTOMS OF IRREGULAR BLOOD GLUCOSE. *BRAND PER PATIENT PREERENCE* 100 strip 3    ibuprofen (ADVIL;MOTRIN) 800 MG tablet Take 1 tablet by mouth every 8 hours as needed for Pain 20 tablet 0    glucose monitoring kit (FREESTYLE) monitoring kit 1 kit by Does not apply route daily 1 kit 0    Incontinence Supply Disposable (ATTENDS BRIEFS LARGE) MISC Use monthly, 2-3 briefs daily for menorrhagia. 24 Bottle 5     No current facility-administered medications for this visit. Facility-Administered Medications Ordered in Other Visits   Medication Dose Route Frequency Provider Last Rate Last Dose    cefTRIAXone (ROCEPHIN) 2 g IVPB in D5W 50ml minibag  2 g Intravenous Once Unknown Provider Result         PMH, Surgical Hx, Family Hx, and Social Hx reviewed and updated.   Health Maintenance body mass index (BMI) of 40.0 to 44.9 in adult (HCC)  -     CBC Auto Differential; Future  -     Comprehensive Metabolic Panel; Future  -     Hemoglobin A1C; Future  -     TSH with Reflex; Future  -     Lipid Panel; Future  -     Vitamin D 25 Hydroxy; Future    Pre-diabetes  -     POCT Glucose  -     CBC Auto Differential; Future  -     Comprehensive Metabolic Panel; Future  -     Hemoglobin A1C; Future  -     Lipid Panel; Future    Menorrhagia with regular cycle  -     CBC Auto Differential; Future  -     Comprehensive Metabolic Panel; Future  -     Incontinence Supply Disposable (WINGS ADULT BRIEFS XXL) MISC; Wear for heavy menstrual bleeding, monthly. Change as needed. -     50 Wes Razo DO, OB-GYN, [place]    Hypothyroidism, unspecified type  -     TSH with Reflex; Future    Vitamin D deficiency  -     Vitamin D 25 Hydroxy; Future    Screening cholesterol level  -     Lipid Panel; Future    Insulin resistance    Dysmenorrhea    6 month follow up with me  Routine labs today.   Contact office with any questions or concerns     Orders Placed This Encounter   Procedures    CBC Auto Differential     Standing Status:   Future     Standing Expiration Date:   9/25/2021    Comprehensive Metabolic Panel     Standing Status:   Future     Standing Expiration Date:   9/25/2021    Hemoglobin A1C     Standing Status:   Future     Standing Expiration Date:   9/25/2021    TSH with Reflex     Standing Status:   Future     Standing Expiration Date:   9/25/2021    Lipid Panel     Standing Status:   Future     Standing Expiration Date:   9/25/2021     Order Specific Question:   Is Patient Fasting?/# of Hours     Answer:   8+ hours    Vitamin D 25 Hydroxy     Standing Status:   Future     Standing Expiration Date:   9/25/2021   50 RuWes See DO, Larry Schmitt     Referral Priority:   Routine     Referral Type:   Eval and Treat     Referral Reason:   Specialty Services Required Referred to Provider:   Vidhi Harrington DO     Requested Specialty:   Obstetrics & Gynecology     Number of Visits Requested:   1    POCT Glucose     Orders Placed This Encounter   Medications    Incontinence Supply Disposable (WINGS ADULT BRIEFS XXL) MISC     Sig: Wear for heavy menstrual bleeding, monthly. Change as needed. Dispense:  36 each     Refill:  5     There are no discontinued medications. No follow-ups on file. Reviewed with the patient: current clinical status, medications, activities and diet. Side effects, adverse effects of the medication prescribed today, as well as treatment plan/ rationale and result expectations have been discussed with the patient who expresses understanding and desires to proceed. Close follow up to evaluate treatment results and for coordination of care. I have reviewed the patient's medical history in detail and updated the computerized patient record.     Jocelyn William PA-C

## 2020-10-20 RX ORDER — DIAPER,BRIEF,ADULT, DISPOSABLE
EACH MISCELLANEOUS
Qty: 36 EACH | Refills: 5 | Status: SHIPPED | OUTPATIENT
Start: 2020-10-20 | End: 2020-12-15

## 2020-10-23 DIAGNOSIS — R73.03 PRE-DIABETES: ICD-10-CM

## 2020-10-23 NOTE — TELEPHONE ENCOUNTER
Rx request   Requested Prescriptions     Pending Prescriptions Disp Refills    blood glucose test strips (FREESTYLE LITE) strip 100 strip 3     Sig: USE TO TEST BLOOD SUGAR TWICE DAILY & AS NEEDED FOR SYMPTOMS OF IRREGULAR BLOOD GLUCOSE.  *BRAND PER PATIENT PREERENCE*    FreeStyle Lancets MISC 100 each 10     LOV 9/25/2020  Next Visit Date:  Future Appointments   Date Time Provider Erick Davies   3/26/2021 11:00 AM Carlos Reyes

## 2020-10-24 RX ORDER — LANCETS 28 GAUGE
EACH MISCELLANEOUS
Qty: 100 EACH | Refills: 10 | Status: SHIPPED | OUTPATIENT
Start: 2020-10-24 | End: 2021-05-04

## 2020-10-24 RX ORDER — BLOOD-GLUCOSE METER
KIT MISCELLANEOUS
Qty: 100 STRIP | Refills: 3 | Status: SHIPPED | OUTPATIENT
Start: 2020-10-24 | End: 2021-02-05

## 2020-11-02 DIAGNOSIS — Z13.220 SCREENING CHOLESTEROL LEVEL: ICD-10-CM

## 2020-11-02 DIAGNOSIS — E66.01 CLASS 3 SEVERE OBESITY DUE TO EXCESS CALORIES WITHOUT SERIOUS COMORBIDITY WITH BODY MASS INDEX (BMI) OF 40.0 TO 44.9 IN ADULT (HCC): ICD-10-CM

## 2020-11-02 DIAGNOSIS — R73.03 PRE-DIABETES: ICD-10-CM

## 2020-11-02 DIAGNOSIS — N92.0 MENORRHAGIA WITH REGULAR CYCLE: ICD-10-CM

## 2020-11-02 DIAGNOSIS — E03.9 HYPOTHYROIDISM, UNSPECIFIED TYPE: ICD-10-CM

## 2020-11-02 DIAGNOSIS — E55.9 VITAMIN D DEFICIENCY: ICD-10-CM

## 2020-11-02 LAB
ALBUMIN SERPL-MCNC: 4.3 G/DL (ref 3.5–4.6)
ALP BLD-CCNC: 78 U/L (ref 40–130)
ALT SERPL-CCNC: 55 U/L (ref 0–33)
ANION GAP SERPL CALCULATED.3IONS-SCNC: 10 MEQ/L (ref 9–15)
AST SERPL-CCNC: 42 U/L (ref 0–35)
BASOPHILS ABSOLUTE: 0.1 K/UL (ref 0–0.2)
BASOPHILS RELATIVE PERCENT: 0.6 %
BILIRUB SERPL-MCNC: 0.5 MG/DL (ref 0.2–0.7)
BUN BLDV-MCNC: 10 MG/DL (ref 6–20)
CALCIUM SERPL-MCNC: 9.9 MG/DL (ref 8.5–9.9)
CHLORIDE BLD-SCNC: 102 MEQ/L (ref 95–107)
CHOLESTEROL, TOTAL: 87 MG/DL (ref 0–199)
CO2: 28 MEQ/L (ref 20–31)
CREAT SERPL-MCNC: 0.82 MG/DL (ref 0.5–0.9)
EOSINOPHILS ABSOLUTE: 0.1 K/UL (ref 0–0.7)
EOSINOPHILS RELATIVE PERCENT: 1.1 %
GFR AFRICAN AMERICAN: >60
GFR NON-AFRICAN AMERICAN: >60
GLOBULIN: 2.5 G/DL (ref 2.3–3.5)
GLUCOSE BLD-MCNC: 87 MG/DL (ref 70–99)
HCT VFR BLD CALC: 41.4 % (ref 37–47)
HDLC SERPL-MCNC: 41 MG/DL (ref 40–59)
HEMOGLOBIN: 13.9 G/DL (ref 12–16)
LDL CHOLESTEROL CALCULATED: 32 MG/DL (ref 0–129)
LYMPHOCYTES ABSOLUTE: 2.3 K/UL (ref 1–4.8)
LYMPHOCYTES RELATIVE PERCENT: 28 %
MCH RBC QN AUTO: 30.9 PG (ref 27–31.3)
MCHC RBC AUTO-ENTMCNC: 33.6 % (ref 33–37)
MCV RBC AUTO: 92.1 FL (ref 82–100)
MONOCYTES ABSOLUTE: 0.7 K/UL (ref 0.2–0.8)
MONOCYTES RELATIVE PERCENT: 8 %
NEUTROPHILS ABSOLUTE: 5.2 K/UL (ref 1.4–6.5)
NEUTROPHILS RELATIVE PERCENT: 62.3 %
PDW BLD-RTO: 15.5 % (ref 11.5–14.5)
PLATELET # BLD: 278 K/UL (ref 130–400)
POTASSIUM SERPL-SCNC: 3.6 MEQ/L (ref 3.4–4.9)
RBC # BLD: 4.5 M/UL (ref 4.2–5.4)
SODIUM BLD-SCNC: 140 MEQ/L (ref 135–144)
TOTAL PROTEIN: 6.8 G/DL (ref 6.3–8)
TRIGL SERPL-MCNC: 68 MG/DL (ref 0–150)
TSH REFLEX: 0.66 UIU/ML (ref 0.44–3.86)
WBC # BLD: 8.3 K/UL (ref 4.8–10.8)

## 2020-11-03 LAB
HBA1C MFR BLD: 6 % (ref 4.8–5.9)
VITAMIN D 25-HYDROXY: 16.2 NG/ML (ref 30–100)

## 2020-12-15 RX ORDER — DIAPER,BRIEF,ADULT, DISPOSABLE
EACH MISCELLANEOUS
Qty: 36 EACH | Refills: 5 | Status: SHIPPED | OUTPATIENT
Start: 2020-12-15 | End: 2021-06-22 | Stop reason: SDUPTHER

## 2021-01-28 ENCOUNTER — TELEPHONE (OUTPATIENT)
Dept: FAMILY MEDICINE CLINIC | Age: 41
End: 2021-01-28

## 2021-01-28 NOTE — TELEPHONE ENCOUNTER
Patient called into the office today saying that she needs documents or a letter so se can get approved for social security. She needs the document to have all her information on it confirming she is a patient as well please advise.

## 2021-01-28 NOTE — TELEPHONE ENCOUNTER
I believe she just needs a face sheet printed? This has her medical diagnoses and medications listed.

## 2021-06-22 ENCOUNTER — TELEPHONE (OUTPATIENT)
Dept: FAMILY MEDICINE CLINIC | Age: 41
End: 2021-06-22

## 2021-06-22 DIAGNOSIS — N92.0 MENORRHAGIA WITH REGULAR CYCLE: ICD-10-CM

## 2021-06-22 RX ORDER — DIAPER,BRIEF,ADULT, DISPOSABLE
EACH MISCELLANEOUS
Qty: 36 EACH | Refills: 5 | Status: SHIPPED | OUTPATIENT
Start: 2021-06-22 | End: 2021-09-02 | Stop reason: SDUPTHER

## 2021-06-22 NOTE — TELEPHONE ENCOUNTER
Called patient to inform her to call insurance and if any further questions please call office.     ADELAIDA Varela

## 2021-06-22 NOTE — TELEPHONE ENCOUNTER
Rx request   Requested Prescriptions     Pending Prescriptions Disp Refills    Incontinence Supply Disposable (WINGS ADULT BRIEFS XXL) MISC 36 each 5     Sig: Wear for heavy menstrual bleeding, monthly. Change as needed. LOV 9/25/2020  Next Visit Date:  No future appointments.

## 2021-06-22 NOTE — TELEPHONE ENCOUNTER
Patient called in and said her glucose monitoring kit is breaking and she is wondering if her insurance will cover the one touch rechargeable?     Please advise and thank you

## 2021-08-17 NOTE — TELEPHONE ENCOUNTER
----- Message from Brandon Lennox sent at 8/17/2021 10:28 AM EDT -----  Subject: Message to Provider    QUESTIONS  Information for Provider? Pt would like another order for size 2x sent to   [facility] for adult pull ups. Pt stated an order was sent [facility] and the order was not filled. (Fax # roseanne Mukherjee is [phone])  ---------------------------------------------------------------------------  --------------  Joanie Grass RAMAN  What is the best way for the office to contact you? OK to leave message on   voicemail  Preferred Call Back Phone Number? [phone]  ---------------------------------------------------------------------------  --------------  SCRIPT ANSWERS  Relationship to Patient?  Self

## 2021-08-31 ENCOUNTER — TELEPHONE (OUTPATIENT)
Dept: FAMILY MEDICINE CLINIC | Age: 41
End: 2021-08-31

## 2021-08-31 DIAGNOSIS — N92.0 MENORRHAGIA WITH REGULAR CYCLE: ICD-10-CM

## 2021-08-31 NOTE — TELEPHONE ENCOUNTER
Patient calling to ask for another order for incontinence supplies sent to [facility]. She needs size 2x please. Will pend. Please advise and thank you.

## 2021-09-02 RX ORDER — DIAPER,BRIEF,ADULT, DISPOSABLE
EACH MISCELLANEOUS
Qty: 36 EACH | Refills: 5 | Status: SHIPPED | OUTPATIENT
Start: 2021-09-02 | End: 2021-11-08 | Stop reason: SDUPTHER

## 2021-09-07 ENCOUNTER — HOSPITAL ENCOUNTER (OUTPATIENT)
Dept: PULMONOLOGY | Age: 41
Discharge: HOME | End: 2021-09-07
Payer: COMMERCIAL

## 2021-09-07 DIAGNOSIS — G47.33 OSA (OBSTRUCTIVE SLEEP APNEA): ICD-10-CM

## 2021-09-07 DIAGNOSIS — G47.34 OXYGEN DESATURATION DURING SLEEP: ICD-10-CM

## 2021-09-07 DIAGNOSIS — R06.02 SOB (SHORTNESS OF BREATH) ON EXERTION: ICD-10-CM

## 2021-09-07 PROCEDURE — 94010 BREATHING CAPACITY TEST: CPT

## 2021-09-07 PROCEDURE — 94726 PLETHYSMOGRAPHY LUNG VOLUMES: CPT

## 2021-09-07 PROCEDURE — 94729 DIFFUSING CAPACITY: CPT

## 2021-09-08 PROCEDURE — 94726 PLETHYSMOGRAPHY LUNG VOLUMES: CPT | Performed by: INTERNAL MEDICINE

## 2021-09-08 PROCEDURE — 94729 DIFFUSING CAPACITY: CPT | Performed by: INTERNAL MEDICINE

## 2021-09-08 PROCEDURE — 94010 BREATHING CAPACITY TEST: CPT | Performed by: INTERNAL MEDICINE

## 2021-09-08 NOTE — PROCEDURES
[address]                      [address], [address] [facility]                    PULMONARY FUNCTION  Ca Octave   37 y.o.   female  Height 65 in  Weight 252 lb      Referring provider   Ade Garcia PA-C    Reading provider   Angélica Liang MD    Test meets ATS criteria for acceptability and reproducibility Yes except for DLCO    Diagnosis: FERRARO: Yes  Cough   No, wheezing No  Smoking   no    Spirometry   FVC            3.07 L   80%     FEV1          2.87 L  92%      FEV1/FVC  93  %                TAV03-11% 4.46 L  140%      Lung volume   SVC           3.21 L  83%   RV              1.21 L 73%   TLC            4.41  L 84%   RV/TLC      27 %    DLCO           88%     Test interpretation     Spirometry is normal  Lung volumes essentially normal except for slightly decreased RV along with decreased ERV most probably this is due to obesity  Diffusion capacity is normal       Clinical correlation is recommended     Angélica Liang MD [facility], 9/8/2021 12:40 PM

## 2021-09-17 ENCOUNTER — TELEPHONE (OUTPATIENT)
Dept: FAMILY MEDICINE CLINIC | Age: 41
End: 2021-09-17

## 2021-09-17 NOTE — TELEPHONE ENCOUNTER
----- Message from Gravityshital Gamez sent at 9/10/2021  3:06 PM EDT -----  Subject: Medication Problem    QUESTIONS  Name of Medication? glucose monitoring kit (FREESTYLE) monitoring kit  Patient-reported dosage and instructions? Twice daily  What question or problem do you have with the medication? Pt called in to   request rechargeable sugar monitor. Preferred Pharmacy? [address], [address] [place] [address]  Pharmacy phone number (if available)? [phone]  Additional Information for Provider? Pt was also told by Dr Reji Nesbitt that she   would send a a prescription for adult pull ups size 2x. Please return call   to pt  ---------------------------------------------------------------------------  --------------  CALL BACK INFO  What is the best way for the office to contact you? OK to leave message on   voicemail  Preferred Call Back Phone Number? [phone]  ---------------------------------------------------------------------------  --------------  SCRIPT ANSWERS  Relationship to Patient?  Self

## 2021-09-18 NOTE — TELEPHONE ENCOUNTER
Will this be covered by her insurance? The rechargeable monitor? Please pending Rx for adult brief size: 2x.

## 2021-10-08 DIAGNOSIS — R73.03 PRE-DIABETES: ICD-10-CM

## 2021-10-08 DIAGNOSIS — E88.81 INSULIN RESISTANCE: ICD-10-CM

## 2021-10-08 DIAGNOSIS — E03.9 HYPOTHYROIDISM, UNSPECIFIED TYPE: ICD-10-CM

## 2021-10-08 DIAGNOSIS — E66.01 MORBID OBESITY WITH BMI OF 40.0-44.9, ADULT (HCC): ICD-10-CM

## 2021-10-08 DIAGNOSIS — E55.9 VITAMIN D DEFICIENCY: ICD-10-CM

## 2021-10-08 RX ORDER — ERGOCALCIFEROL 1.25 MG/1
CAPSULE ORAL
Qty: 4 CAPSULE | Refills: 10 | Status: SHIPPED | OUTPATIENT
Start: 2021-10-08 | End: 2022-04-12 | Stop reason: SDUPTHER

## 2021-10-08 RX ORDER — LEVOTHYROXINE SODIUM 0.03 MG/1
TABLET ORAL
Qty: 30 TABLET | Refills: 10 | Status: SHIPPED | OUTPATIENT
Start: 2021-10-08 | End: 2022-09-22

## 2021-10-29 DIAGNOSIS — R73.03 PRE-DIABETES: ICD-10-CM

## 2021-10-29 DIAGNOSIS — E88.81 INSULIN RESISTANCE: ICD-10-CM

## 2021-10-29 RX ORDER — BLOOD-GLUCOSE METER
1 KIT MISCELLANEOUS DAILY
Qty: 1 KIT | Refills: 0 | Status: SHIPPED | OUTPATIENT
Start: 2021-10-29 | End: 2022-02-11 | Stop reason: SDUPTHER

## 2021-10-29 NOTE — TELEPHONE ENCOUNTER
Patient requesting medication refill.  Please approve or deny this request.    Rx requested:  Requested Prescriptions     Pending Prescriptions Disp Refills    glucose monitoring (FREESTYLE) kit 1 kit 0     Si kit by Does not apply route daily         Last Office Visit:   2020      Next Visit Date:  Future Appointments   Date Time Provider Erick Davies   2021  3:00 PM Zarina Canales MD [address] [place] [address]

## 2021-11-08 ENCOUNTER — OFFICE VISIT (OUTPATIENT)
Dept: FAMILY MEDICINE CLINIC | Age: 41
End: 2021-11-08
Payer: COMMERCIAL

## 2021-11-08 VITALS
RESPIRATION RATE: 18 BRPM | HEIGHT: 65 IN | OXYGEN SATURATION: 98 % | BODY MASS INDEX: 43.18 KG/M2 | SYSTOLIC BLOOD PRESSURE: 160 MMHG | TEMPERATURE: 98.2 F | DIASTOLIC BLOOD PRESSURE: 98 MMHG | HEART RATE: 76 BPM | WEIGHT: 259.2 LBS

## 2021-11-08 DIAGNOSIS — Z00.00 ROUTINE ADULT HEALTH MAINTENANCE: ICD-10-CM

## 2021-11-08 DIAGNOSIS — I10 HYPERTENSION, UNSPECIFIED TYPE: ICD-10-CM

## 2021-11-08 DIAGNOSIS — Z23 FLU VACCINE NEED: ICD-10-CM

## 2021-11-08 DIAGNOSIS — E88.81 INSULIN RESISTANCE: ICD-10-CM

## 2021-11-08 DIAGNOSIS — E66.01 MORBID OBESITY WITH BMI OF 40.0-44.9, ADULT (HCC): ICD-10-CM

## 2021-11-08 DIAGNOSIS — E03.9 HYPOTHYROIDISM, UNSPECIFIED TYPE: ICD-10-CM

## 2021-11-08 DIAGNOSIS — E55.9 VITAMIN D DEFICIENCY: ICD-10-CM

## 2021-11-08 DIAGNOSIS — Z87.42 HISTORY OF MENORRHAGIA: ICD-10-CM

## 2021-11-08 DIAGNOSIS — R73.03 PRE-DIABETES: Primary | ICD-10-CM

## 2021-11-08 DIAGNOSIS — G47.00 INSOMNIA, UNSPECIFIED TYPE: ICD-10-CM

## 2021-11-08 PROCEDURE — G8417 CALC BMI ABV UP PARAM F/U: HCPCS | Performed by: INTERNAL MEDICINE

## 2021-11-08 PROCEDURE — G8427 DOCREV CUR MEDS BY ELIG CLIN: HCPCS | Performed by: INTERNAL MEDICINE

## 2021-11-08 PROCEDURE — G8482 FLU IMMUNIZE ORDER/ADMIN: HCPCS | Performed by: INTERNAL MEDICINE

## 2021-11-08 PROCEDURE — 1036F TOBACCO NON-USER: CPT | Performed by: INTERNAL MEDICINE

## 2021-11-08 PROCEDURE — G0008 ADMIN INFLUENZA VIRUS VAC: HCPCS | Performed by: INTERNAL MEDICINE

## 2021-11-08 PROCEDURE — 90674 CCIIV4 VAC NO PRSV 0.5 ML IM: CPT | Performed by: INTERNAL MEDICINE

## 2021-11-08 PROCEDURE — 99214 OFFICE O/P EST MOD 30 MIN: CPT | Performed by: INTERNAL MEDICINE

## 2021-11-08 RX ORDER — BLOOD PRESSURE TEST KIT
1 KIT MISCELLANEOUS 2 TIMES DAILY
Qty: 1 KIT | Refills: 1 | Status: SHIPPED | OUTPATIENT
Start: 2021-11-08 | End: 2022-02-11 | Stop reason: SDUPTHER

## 2021-11-08 RX ORDER — DIAPER,BRIEF,ADULT, DISPOSABLE
EACH MISCELLANEOUS
Qty: 24 EACH | Refills: 3 | Status: SHIPPED | OUTPATIENT
Start: 2021-11-08 | End: 2022-02-11 | Stop reason: SDUPTHER

## 2021-11-08 RX ORDER — LOSARTAN POTASSIUM 25 MG/1
25 TABLET ORAL DAILY
Qty: 30 TABLET | Refills: 0 | Status: SHIPPED | OUTPATIENT
Start: 2021-11-08 | End: 2021-12-14

## 2021-11-08 RX ORDER — TRAZODONE HYDROCHLORIDE 50 MG/1
50 TABLET ORAL NIGHTLY PRN
Qty: 14 TABLET | Refills: 0 | Status: SHIPPED | OUTPATIENT
Start: 2021-11-08 | End: 2021-12-14

## 2021-11-08 SDOH — ECONOMIC STABILITY: FOOD INSECURITY: WITHIN THE PAST 12 MONTHS, THE FOOD YOU BOUGHT JUST DIDN'T LAST AND YOU DIDN'T HAVE MONEY TO GET MORE.: NEVER TRUE

## 2021-11-08 SDOH — ECONOMIC STABILITY: FOOD INSECURITY: WITHIN THE PAST 12 MONTHS, YOU WORRIED THAT YOUR FOOD WOULD RUN OUT BEFORE YOU GOT MONEY TO BUY MORE.: NEVER TRUE

## 2021-11-08 ASSESSMENT — PATIENT HEALTH QUESTIONNAIRE - PHQ9
1. LITTLE INTEREST OR PLEASURE IN DOING THINGS: 0
SUM OF ALL RESPONSES TO PHQ9 QUESTIONS 1 & 2: 0
SUM OF ALL RESPONSES TO PHQ QUESTIONS 1-9: 0
SUM OF ALL RESPONSES TO PHQ QUESTIONS 1-9: 0
2. FEELING DOWN, DEPRESSED OR HOPELESS: 0
SUM OF ALL RESPONSES TO PHQ QUESTIONS 1-9: 0

## 2021-11-08 ASSESSMENT — ENCOUNTER SYMPTOMS
NAUSEA: 0
COUGH: 0
VOMITING: 0
CHEST TIGHTNESS: 0
ABDOMINAL DISTENTION: 0
ABDOMINAL PAIN: 0
SHORTNESS OF BREATH: 0
DIARRHEA: 0
WHEEZING: 0

## 2021-11-08 ASSESSMENT — SOCIAL DETERMINANTS OF HEALTH (SDOH): HOW HARD IS IT FOR YOU TO PAY FOR THE VERY BASICS LIKE FOOD, HOUSING, MEDICAL CARE, AND HEATING?: NOT HARD AT ALL

## 2021-11-08 NOTE — PROGRESS NOTES
Subjective  Lynnette Munoz, 37 y.o. female presents today with:  Chief Complaint   Patient presents with    Diabetes     follow up        HPI  Patient presenting today for follow up. Last office visit was on 9/25/21. Medical history is significant for Pre-diabetes, Hypothyroidism, Menorrhagia in setting of uterine fibroids and Class III Obesity. Prediabetes. Monitoring BG at home, usually in 70- 180 range per patient. Low readings occasionally. No longer taking Metformin. Insomnia  Reports difficulty falling asleep over the last couple of weeks, associated daytime fatigue. Hypothyroidism. Taking 25 mcg daily. Due for TSH monitoring. Asthma  No recent exacerbations  Using INH albuterol as needed. Menorrhagia  Patient reports uterine fibroids, no prior imaging available for review. Interferes with daily functioning, patient unable to leave home at times. Has been unable to follow with OB-GYN. Class III Obesity  7 lb weight loss since last visit  Adopting healthy lifestyle changes. Patient's active complaint today is difficulty sleeping over the last couple of weeks. This is associated with overactive thoughts at night and worsening daytime fatigue. She has not tried any sleep aids previously. Evaluation also notable for newly-diagnosed Hypertension. She denies associated chest pain, headaches or visual blurring. Review of Systems   Constitutional: Positive for fatigue. Negative for activity change, appetite change, chills and diaphoresis. HENT: Negative. Respiratory: Negative for cough, chest tightness, shortness of breath and wheezing. Cardiovascular: Negative for chest pain and palpitations. Gastrointestinal: Negative for abdominal distention, abdominal pain, diarrhea, nausea and vomiting. Endocrine: Negative for cold intolerance, heat intolerance, polydipsia, polyphagia and polyuria. Genitourinary: Positive for menstrual problem (menorrhagia).  Negative for dysuria, frequency and pelvic pain. Neurological: Negative for dizziness, syncope and headaches. Hematological: Does not bruise/bleed easily. Psychiatric/Behavioral: Positive for sleep disturbance. Negative for dysphoric mood. The patient is not nervous/anxious. Past Medical History:   Diagnosis Date    Depression     Diabetes mellitus ([address]     Thyroid disease 2017     Past Surgical History:   Procedure Laterality Date    KNEE SURGERY  2016     Social History     Socioeconomic History    Marital status: Single     Spouse name: Not on file    Number of children: Not on file    Years of education: Not on file    Highest education level: Not on file   Occupational History    Not on file   Tobacco Use    Smoking status: Never Smoker    Smokeless tobacco: Never Used   Vaping Use    Vaping Use: Never used   Substance and Sexual Activity    Alcohol use: No    Drug use: No    Sexual activity: Never   Other Topics Concern    Not on file   Social History Narrative    Not on file     Social Determinants of Health     Financial Resource Strain: Low Risk     Difficulty of Paying Living Expenses: Not hard at all   Food Insecurity: No Food Insecurity    Worried About [address] in the Last Year: Never true    [address] in the Last Year: Never true   Transportation Needs:     Lack of Transportation (Medical): Not on file    Lack of Transportation (Non-Medical):  Not on file   Physical Activity:     Days of Exercise per Week: Not on file    Minutes of Exercise per Session: Not on file   Stress:     Feeling of Stress : Not on file   Social Connections:     Frequency of Communication with Friends and Family: Not on file    Frequency of Social Gatherings with Friends and Family: Not on file    Attends Taoism Services: Not on file    Active Member of Clubs or Organizations: Not on file    Attends Club or Organization Meetings: Not on file    Marital Status: Not on file Intimate Partner Violence:     Fear of Current or Ex-Partner: Not on file    Emotionally Abused: Not on file    Physically Abused: Not on file    Sexually Abused: Not on file   Housing Stability:     Unable to Pay for Housing in the Last Year: Not on file    Number of Places Lived in the Last Year: Not on file    Unstable Housing in the Last Year: Not on file     Family History   Problem Relation Age of Onset    Diabetes Mother     High Blood Pressure Mother     Kidney Disease Mother     Stroke Mother     Diabetes Father     High Blood Pressure Father     Diabetes Sister     High Blood Pressure Sister        Objective  Vitals:    11/08/21 1321 11/08/21 1325   BP: (!) 160/98 (!) 160/98   Site: Right Upper Arm Left Upper Arm   Position: Sitting Sitting   Cuff Size: Large Adult Large Adult   Pulse: 76    Resp: 18    Temp: 98.2 °F (36.8 °C)    TempSrc: Temporal    SpO2: 98%    Weight: 259 lb 3.2 oz (117.6 kg)    Height: 5' 5\" (1.651 m)      BP Readings from Last 3 Encounters:   11/08/21 (!) 160/98   09/25/20 132/80   01/20/20 126/84     Wt Readings from Last 3 Encounters:   11/08/21 259 lb 3.2 oz (117.6 kg)   09/25/20 252 lb (114.3 kg)   01/20/20 256 lb (116.1 kg)     Physical Exam  Vitals reviewed. Constitutional:       General: She is not in acute distress. Appearance: She is obese. She is not ill-appearing. HENT:      Head: Normocephalic. Mouth/Throat:      Pharynx: No oropharyngeal exudate. Eyes:      Extraocular Movements: Extraocular movements intact. Conjunctiva/sclera: Conjunctivae normal.      Pupils: Pupils are equal, round, and reactive to light. Neck:      Thyroid: No thyromegaly. Trachea: No tracheal deviation. Cardiovascular:      Rate and Rhythm: Normal rate and regular rhythm. Heart sounds: No murmur heard. Pulmonary:      Effort: Pulmonary effort is normal. No respiratory distress. Breath sounds: Normal breath sounds. No stridor. No wheezing. Abdominal:      General: There is no distension. Palpations: Abdomen is soft. Tenderness: There is no abdominal tenderness. Comments: Protuberant     Genitourinary:     Comments:     Skin:     General: Skin is warm and dry. Neurological:      General: No focal deficit present. Mental Status: She is alert and oriented to person, place, and time. Mental status is at baseline. Psychiatric:         Behavior: Behavior normal.         Thought Content: Thought content normal.       Assessment & Plan   Joan Cortes was seen today for follow up. Diagnoses and all orders for this visit:    Pre-diabetes        -     Last HbA1c 6.0 (9/2020)  -     POCT glycosylated hemoglobin (Hb A1C)    History of menorrhagia  -     CBC With Auto Differential; Future  -     Incontinence Supply Disposable (WINGS ADULT BRIEFS XXL) MISC; Indications: Excessive Amount of Menstrual Volume Use three times daily as needed. -     OB-GYN Referral to Dr. Bliss Records     Hypertension, unspecified type        -     Elevated BP without history of HTN; currently 160/98  -     losartan (COZAAR) 25 MG tablet; Take 1 tablet by mouth daily along with lifestyle changes. -     Blood Pressure KIT; 1 Device by Does not apply route 2 times daily  -      2 week follow up. Hypothyroidism, unspecified type        -     TSH 11/2/20 0.66. On 25 mcg Synthroid daily.  -     TSH today. Vit D deficiency        -      16.2 in 11/2020        -      Ongoing supplementation    Insomnia, unspecified type  -     traZODone (DESYREL) 50 MG tablet; Take 1 tablet by mouth nightly as needed for Sleep    Routine adult health maintenance  -     HIV Screen; Future  -     Hepatitis C Antibody; Future  -     INFLUENZA, MDCK QUADV, 2 YRS AND OLDER, IM, PF, PREFILL SYR OR SDV, 0.5ML (FLUCELVAX QUADV, PF)   -     Lipid Panel; Future  -     Basic Metabolic Panel;  Future    Morbid obesity with BMI of 40.0-44.9, adult ([facility] [address]        -     Counseled on lifestyle modifications and risk factor reduction. Flu vaccine need  -     INFLUENZA, MDCK QUADV, 2 YRS AND OLDER, IM, PF, PREFILL SYR OR SDV, 0.5ML (FLUCELVAX QUADV, PF)      Orders Placed This Encounter   Procedures    INFLUENZA, MDCK QUADV, 2 YRS AND OLDER, IM, PF, PREFILL SYR OR SDV, 0.5ML (FLUCELVAX QUADV, PF)    TSH Without Reflex     Standing Status:   Future     Standing Expiration Date:   2022    HIV Screen     Standing Status:   Future     Standing Expiration Date:   2022    Hepatitis C Antibody     Standing Status:   Future     Standing Expiration Date:   2022    Lipid Panel     Standing Status:   Future     Standing Expiration Date:   2022     Order Specific Question:   Is Patient Fasting?/# of Hours     Answer:   Non-fasting    CBC With Auto Differential     Standing Status:   Future     Standing Expiration Date:   2022    Basic Metabolic Panel     Standing Status:   Future     Standing Expiration Date:   2022    POCT glycosylated hemoglobin (Hb A1C)     Orders Placed This Encounter   Medications    Incontinence Supply Disposable ([employer] - Onvelop ADULT BRIEFS XXL) MISC     Sig: Indications: Excessive Amount of Menstrual Volume Use three times daily as needed. Dispense:  24 each     Refill:  3    traZODone (DESYREL) 50 MG tablet     Sig: Take 1 tablet by mouth nightly as needed for Sleep     Dispense:  14 tablet     Refill:  0    losartan (COZAAR) 25 MG tablet     Sig: Take 1 tablet by mouth daily     Dispense:  30 tablet     Refill:  0    Blood Pressure KIT     Si Device by Does not apply route 2 times daily     Dispense:  1 kit     Refill:  1     Medications Discontinued During This Encounter   Medication Reason    Incontinence Supply Disposable ([employer] - Onvelop ADULT BRIEFS XXL) [address]     Return for F/u in 2 weeks.     A total of 35 mins was spent reviewing patients medical documentation, previous labs, conducting a face-to-face encounter, reviewing treatment rationale and providing     Close follow up to evaluate treatment results and for coordination of care.     Edgar Martínez MD

## 2021-11-30 ENCOUNTER — TELEPHONE (OUTPATIENT)
Dept: FAMILY MEDICINE CLINIC | Age: 41
End: 2021-11-30

## 2021-11-30 NOTE — TELEPHONE ENCOUNTER
Pt calling in to ask if there is an alternative to her glucose kit that could be sent in. Please advise and thank you.

## 2021-12-02 DIAGNOSIS — E03.9 HYPOTHYROIDISM, UNSPECIFIED TYPE: ICD-10-CM

## 2021-12-02 NOTE — TELEPHONE ENCOUNTER
Patient has called in and is requesting an alternative to the BP cuff and the sugar machine her insurance will not cover it. She has not started taking her new BP meds because she does not have a way to check her BP. Patient also states she cannot get a refill on her briefs size 2x due to insurance requesting additional information including why patient needs these. She states she bleeds heavily monthly and that is why they are needed.     Please advise and thank you

## 2021-12-09 ENCOUNTER — TELEPHONE (OUTPATIENT)
Dept: FAMILY MEDICINE CLINIC | Age: 41
End: 2021-12-09

## 2021-12-09 NOTE — TELEPHONE ENCOUNTER
----- Message from Valorie Tilley, [place] sent at 12/7/2021  9:34 AM EST -----  Subject: Message to Provider    QUESTIONS  Information for Provider? Pt called and stated that she has been waiting   to get a response about her BP machine. Please call the pt back and let   her know the progress of the PA.   ---------------------------------------------------------------------------  --------------  CALL BACK INFO  What is the best way for the office to contact you? OK to leave message on   voicemail  Preferred Call Back Phone Number? [phone]  ---------------------------------------------------------------------------  --------------  SCRIPT ANSWERS  Relationship to Patient?  Self

## 2022-02-02 ENCOUNTER — OFFICE VISIT (OUTPATIENT)
Dept: OBGYN CLINIC | Age: 42
End: 2022-02-02
Payer: COMMERCIAL

## 2022-02-02 VITALS
WEIGHT: 258.6 LBS | DIASTOLIC BLOOD PRESSURE: 100 MMHG | BODY MASS INDEX: 43.09 KG/M2 | HEIGHT: 65 IN | SYSTOLIC BLOOD PRESSURE: 150 MMHG

## 2022-02-02 DIAGNOSIS — Z00.00 ROUTINE ADULT HEALTH MAINTENANCE: ICD-10-CM

## 2022-02-02 DIAGNOSIS — E03.9 HYPOTHYROIDISM, UNSPECIFIED TYPE: ICD-10-CM

## 2022-02-02 DIAGNOSIS — Z87.42 HISTORY OF MENORRHAGIA: ICD-10-CM

## 2022-02-02 DIAGNOSIS — E55.9 VITAMIN D DEFICIENCY: ICD-10-CM

## 2022-02-02 DIAGNOSIS — N93.8 DUB (DYSFUNCTIONAL UTERINE BLEEDING): Primary | ICD-10-CM

## 2022-02-02 LAB
ANION GAP SERPL CALCULATED.3IONS-SCNC: 14 MEQ/L (ref 9–15)
BASOPHILS ABSOLUTE: 0 K/UL (ref 0–0.2)
BASOPHILS RELATIVE PERCENT: 0.5 %
BUN BLDV-MCNC: 13 MG/DL (ref 6–20)
CALCIUM SERPL-MCNC: 9.3 MG/DL (ref 8.5–9.9)
CHLORIDE BLD-SCNC: 105 MEQ/L (ref 95–107)
CHOLESTEROL, TOTAL: 88 MG/DL (ref 0–199)
CO2: 25 MEQ/L (ref 20–31)
CREAT SERPL-MCNC: 0.89 MG/DL (ref 0.5–0.9)
EOSINOPHILS ABSOLUTE: 0.1 K/UL (ref 0–0.7)
EOSINOPHILS RELATIVE PERCENT: 0.8 %
GFR AFRICAN AMERICAN: >60
GFR NON-AFRICAN AMERICAN: >60
GLUCOSE BLD-MCNC: 76 MG/DL (ref 70–99)
HCT VFR BLD CALC: 42.2 % (ref 37–47)
HDLC SERPL-MCNC: 42 MG/DL (ref 40–59)
HEMOGLOBIN: 14.5 G/DL (ref 12–16)
LDL CHOLESTEROL CALCULATED: 36 MG/DL (ref 0–129)
LYMPHOCYTES ABSOLUTE: 2.4 K/UL (ref 1–4.8)
LYMPHOCYTES RELATIVE PERCENT: 27.5 %
MCH RBC QN AUTO: 31.1 PG (ref 27–31.3)
MCHC RBC AUTO-ENTMCNC: 34.4 % (ref 33–37)
MCV RBC AUTO: 90.4 FL (ref 82–100)
MONOCYTES ABSOLUTE: 0.5 K/UL (ref 0.2–0.8)
MONOCYTES RELATIVE PERCENT: 6.1 %
NEUTROPHILS ABSOLUTE: 5.6 K/UL (ref 1.4–6.5)
NEUTROPHILS RELATIVE PERCENT: 65.1 %
PDW BLD-RTO: 13.4 % (ref 11.5–14.5)
PLATELET # BLD: 278 K/UL (ref 130–400)
POTASSIUM SERPL-SCNC: 3.8 MEQ/L (ref 3.4–4.9)
RBC # BLD: 4.67 M/UL (ref 4.2–5.4)
SODIUM BLD-SCNC: 144 MEQ/L (ref 135–144)
TRIGL SERPL-MCNC: 48 MG/DL (ref 0–150)
TSH SERPL DL<=0.05 MIU/L-ACNC: 1.03 UIU/ML (ref 0.44–3.86)
WBC # BLD: 8.6 K/UL (ref 4.8–10.8)

## 2022-02-02 PROCEDURE — 99204 OFFICE O/P NEW MOD 45 MIN: CPT | Performed by: OBSTETRICS & GYNECOLOGY

## 2022-02-02 PROCEDURE — 1036F TOBACCO NON-USER: CPT | Performed by: OBSTETRICS & GYNECOLOGY

## 2022-02-02 PROCEDURE — G8482 FLU IMMUNIZE ORDER/ADMIN: HCPCS | Performed by: OBSTETRICS & GYNECOLOGY

## 2022-02-02 PROCEDURE — G8417 CALC BMI ABV UP PARAM F/U: HCPCS | Performed by: OBSTETRICS & GYNECOLOGY

## 2022-02-02 PROCEDURE — G8427 DOCREV CUR MEDS BY ELIG CLIN: HCPCS | Performed by: OBSTETRICS & GYNECOLOGY

## 2022-02-03 LAB
HEPATITIS C ANTIBODY: NONREACTIVE
HIV AG/AB: NONREACTIVE
VITAMIN D 25-HYDROXY: 15.2 NG/ML (ref 30–100)

## 2022-02-03 ASSESSMENT — ENCOUNTER SYMPTOMS
APNEA: 0
ABDOMINAL PAIN: 0
SHORTNESS OF BREATH: 0

## 2022-02-03 NOTE — PROGRESS NOTES
Subjective:      Patient ID:  Michael Oden is a 37 y.o. female with chief complaint of:  Chief Complaint   Patient presents with    Menstrual Problem     pt here today c/o DUB for years. pt was on bc pills before but it made it worse. when on cycle pt has heavy bleeding and blood clots           Patient presents today with concerns of bald dysfunctional uterine bleeding that has been going on for 2 years at least but worsened in the last few months. Patient states that her periods are extremely heavy with clots there is some pain. Patient is not sexually active patient's last Pap smear was approximately 3 to 4 years ago. Patient denies any change in her bowel or bladder habits or her weight patient is diabetic and has issues of hirsutism. Past Medical History:   Diagnosis Date    Depression     Diabetes mellitus ([address]     Thyroid disease 2017     Past Surgical History:   Procedure Laterality Date    KNEE SURGERY  2016     Family History   Problem Relation Age of Onset    Diabetes Mother     High Blood Pressure Mother     Kidney Disease Mother     Stroke Mother     Diabetes Father     High Blood Pressure Father     Diabetes Sister     High Blood Pressure Sister      Allergies:  Patient has no known allergies. Review of Systems   Constitutional: Negative for fatigue and fever. Respiratory: Negative for apnea and shortness of breath. Cardiovascular: Negative for chest pain and palpitations. Gastrointestinal: Negative for abdominal pain. Genitourinary: Positive for menstrual problem, pelvic pain and vaginal bleeding. Negative for difficulty urinating, dysuria and vaginal discharge. Neurological: Negative for dizziness, weakness and light-headedness. Psychiatric/Behavioral: Negative for agitation and dysphoric mood.        Objective:   BP (!) 150/100   Ht 5' 5\" (1.651 m)   Wt 258 lb 9.6 oz (117.3 kg)   LMP 01/27/2022   BMI 43.03 kg/m²      Physical Exam  Constitutional: Appearance: Normal appearance. She is well-developed. She is obese. Eyes:      Pupils: Pupils are equal, round, and reactive to light. Neck:      Thyroid: No thyroid mass, thyromegaly or thyroid tenderness. Cardiovascular:      Rate and Rhythm: Normal rate and regular rhythm. Heart sounds: Normal heart sounds. Pulmonary:      Effort: Pulmonary effort is normal.   Abdominal:      General: Bowel sounds are normal.      Palpations: Abdomen is soft. Genitourinary:     Comments: refused vaginal exam very anxious  Skin:     General: Skin is warm and dry. Comments: Excessive body hair and chin   Neurological:      Mental Status: She is alert and oriented to person, place, and time. Psychiatric:         Mood and Affect: Mood normal.         Behavior: Behavior normal.         Assessment:       Diagnosis Orders   1. DUB (dysfunctional uterine bleeding)  US PELVIS COMPLETE         Plan:      Orders Placed This Encounter   Procedures    US PELVIS COMPLETE     Standing Status:   Future     Number of Occurrences:   1     Standing Expiration Date:   2/2/2023     No orders of the defined types were placed in this encounter. Return if symptoms worsen or fail to improve, for follow up results.      Liz Louis, DO

## 2022-02-07 ENCOUNTER — TELEPHONE (OUTPATIENT)
Dept: OBGYN CLINIC | Age: 42
End: 2022-02-07

## 2022-02-07 NOTE — TELEPHONE ENCOUNTER
Pt sister calling states the pt is supposed to be put to sleep for her ultrasound? Scheduled for 2-11-22. Is this correct? Please advise.

## 2022-02-09 DIAGNOSIS — Z87.42 HISTORY OF MENORRHAGIA: ICD-10-CM

## 2022-02-09 DIAGNOSIS — I10 HYPERTENSION, UNSPECIFIED TYPE: ICD-10-CM

## 2022-02-09 DIAGNOSIS — E88.81 INSULIN RESISTANCE: ICD-10-CM

## 2022-02-09 DIAGNOSIS — R73.03 PRE-DIABETES: ICD-10-CM

## 2022-02-11 ENCOUNTER — HOSPITAL ENCOUNTER (OUTPATIENT)
Dept: ULTRASOUND IMAGING | Age: 42
Discharge: HOME | End: 2022-02-13
Payer: COMMERCIAL

## 2022-02-11 DIAGNOSIS — N93.8 DUB (DYSFUNCTIONAL UTERINE BLEEDING): ICD-10-CM

## 2022-02-11 PROCEDURE — 76856 US EXAM PELVIC COMPLETE: CPT

## 2022-02-11 RX ORDER — BLOOD PRESSURE TEST KIT
1 KIT MISCELLANEOUS 2 TIMES DAILY
Qty: 1 KIT | Refills: 1 | Status: SHIPPED | OUTPATIENT
Start: 2022-02-11

## 2022-02-11 RX ORDER — BLOOD-GLUCOSE METER
1 KIT MISCELLANEOUS DAILY
Qty: 1 KIT | Refills: 0 | Status: SHIPPED | OUTPATIENT
Start: 2022-02-11 | End: 2022-02-18 | Stop reason: SDUPTHER

## 2022-02-11 RX ORDER — DIAPER,BRIEF,ADULT, DISPOSABLE
EACH MISCELLANEOUS
Qty: 24 EACH | Refills: 3 | Status: SHIPPED | OUTPATIENT
Start: 2022-02-11

## 2022-02-18 ENCOUNTER — TELEPHONE (OUTPATIENT)
Dept: FAMILY MEDICINE CLINIC | Age: 42
End: 2022-02-18

## 2022-02-18 DIAGNOSIS — E88.81 INSULIN RESISTANCE: ICD-10-CM

## 2022-02-18 DIAGNOSIS — R73.03 PRE-DIABETES: ICD-10-CM

## 2022-02-18 RX ORDER — BLOOD-GLUCOSE METER
1 KIT MISCELLANEOUS DAILY
Qty: 1 KIT | Refills: 0 | Status: SHIPPED | OUTPATIENT
Start: 2022-02-18

## 2022-02-18 NOTE — TELEPHONE ENCOUNTER
Last send to [facility] did not show receipt confirmed. Patient spoke with the pharmacy and they did not receive. Please approve or deny this request.    Rx requested:  Requested Prescriptions     Pending Prescriptions Disp Refills    glucose monitoring (FREESTYLE) kit 1 kit 0     Si kit by Does not apply route daily         Last Office Visit:   2020      Next Visit Date:  No future appointments.

## 2022-02-25 ENCOUNTER — TELEPHONE (OUTPATIENT)
Dept: FAMILY MEDICINE CLINIC | Age: 42
End: 2022-02-25

## 2022-02-25 NOTE — TELEPHONE ENCOUNTER
----- Message from [facility] sent at 2/25/2022 10:31 AM EST -----  Subject: Message to Provider    QUESTIONS  Information for Provider? Pt states she needs a machine to test her sugar;   pharmacy has not received the order from PCP. Pt would like to know if   this can be taken care of soon. ---------------------------------------------------------------------------  --------------  Devi Plan INFO  What is the best way for the office to contact you? OK to leave message on   voicemail  Preferred Call Back Phone Number? [phone]  ---------------------------------------------------------------------------  --------------  SCRIPT ANSWERS  Relationship to Patient?  Self

## 2022-03-01 DIAGNOSIS — G47.00 INSOMNIA, UNSPECIFIED TYPE: ICD-10-CM

## 2022-03-01 DIAGNOSIS — R73.03 PRE-DIABETES: ICD-10-CM

## 2022-03-08 RX ORDER — BLOOD-GLUCOSE METER
KIT MISCELLANEOUS
Qty: 100 STRIP | Refills: 3 | OUTPATIENT
Start: 2022-03-08

## 2022-03-08 RX ORDER — TRAZODONE HYDROCHLORIDE 50 MG/1
TABLET ORAL
Qty: 30 TABLET | Refills: 2 | OUTPATIENT
Start: 2022-03-08

## 2022-03-08 RX ORDER — BLOOD-GLUCOSE METER
KIT MISCELLANEOUS
Qty: 1 KIT | Refills: 0 | OUTPATIENT
Start: 2022-03-08

## 2022-03-12 ENCOUNTER — ANESTHESIA EVENT (OUTPATIENT)
Dept: OPERATING ROOM | Age: 42
End: 2022-03-12
Payer: COMMERCIAL

## 2022-03-18 DIAGNOSIS — G47.00 INSOMNIA, UNSPECIFIED TYPE: ICD-10-CM

## 2022-03-18 NOTE — TELEPHONE ENCOUNTER
Rx request   Requested Prescriptions     Pending Prescriptions Disp Refills    traZODone (DESYREL) 50 MG tablet 30 tablet 2     LOV 9/25/2020  Next Visit Date:  Future Appointments   Date Time Provider Erick Davies   3/28/2022  8:00 AM BOBBY ZUÑIGA  [address]

## 2022-03-21 RX ORDER — TRAZODONE HYDROCHLORIDE 50 MG/1
TABLET ORAL
Qty: 30 TABLET | Refills: 0 | Status: SHIPPED | OUTPATIENT
Start: 2022-03-21 | End: 2022-05-09

## 2022-03-28 ENCOUNTER — HOSPITAL ENCOUNTER (OUTPATIENT)
Dept: PREADMISSION TESTING | Age: 42
Discharge: HOME | End: 2022-04-01
Payer: COMMERCIAL

## 2022-03-28 VITALS
SYSTOLIC BLOOD PRESSURE: 165 MMHG | HEIGHT: 66 IN | DIASTOLIC BLOOD PRESSURE: 91 MMHG | HEART RATE: 76 BPM | RESPIRATION RATE: 16 BRPM | OXYGEN SATURATION: 98 % | BODY MASS INDEX: 41.21 KG/M2 | TEMPERATURE: 98.2 F | WEIGHT: 256.4 LBS

## 2022-03-28 DIAGNOSIS — R06.02 SOB (SHORTNESS OF BREATH): Chronic | ICD-10-CM

## 2022-03-28 DIAGNOSIS — N94.6 DYSMENORRHEA: ICD-10-CM

## 2022-03-28 DIAGNOSIS — N92.0 MENORRHAGIA WITH REGULAR CYCLE: ICD-10-CM

## 2022-03-28 DIAGNOSIS — N93.8 DUB (DYSFUNCTIONAL UTERINE BLEEDING): ICD-10-CM

## 2022-03-28 LAB
ABO/RH: NORMAL
ANION GAP SERPL CALCULATED.3IONS-SCNC: 11 MEQ/L (ref 9–15)
ANTIBODY SCREEN: NORMAL
BUN BLDV-MCNC: 17 MG/DL (ref 6–20)
CALCIUM SERPL-MCNC: 9.9 MG/DL (ref 8.5–9.9)
CHLORIDE BLD-SCNC: 107 MEQ/L (ref 95–107)
CO2: 28 MEQ/L (ref 20–31)
CREAT SERPL-MCNC: 0.95 MG/DL (ref 0.5–0.9)
EKG ATRIAL RATE: 73 BPM
EKG P AXIS: 47 DEGREES
EKG P-R INTERVAL: 152 MS
EKG Q-T INTERVAL: 408 MS
EKG QRS DURATION: 86 MS
EKG QTC CALCULATION (BAZETT): 449 MS
EKG R AXIS: 42 DEGREES
EKG T AXIS: 37 DEGREES
EKG VENTRICULAR RATE: 73 BPM
GFR AFRICAN AMERICAN: >60
GFR NON-AFRICAN AMERICAN: >60
GLUCOSE BLD-MCNC: 107 MG/DL (ref 70–99)
HBA1C MFR BLD: 6 % (ref 4.8–5.9)
HCT VFR BLD CALC: 43.8 % (ref 37–47)
HEMOGLOBIN: 14.9 G/DL (ref 12–16)
MCH RBC QN AUTO: 31 PG (ref 27–31.3)
MCHC RBC AUTO-ENTMCNC: 34 % (ref 33–37)
MCV RBC AUTO: 91.2 FL (ref 82–100)
PDW BLD-RTO: 13.8 % (ref 11.5–14.5)
PLATELET # BLD: 285 K/UL (ref 130–400)
POTASSIUM SERPL-SCNC: 4.7 MEQ/L (ref 3.4–4.9)
RBC # BLD: 4.8 M/UL (ref 4.2–5.4)
SODIUM BLD-SCNC: 146 MEQ/L (ref 135–144)
WBC # BLD: 8.8 K/UL (ref 4.8–10.8)

## 2022-03-28 PROCEDURE — 80048 BASIC METABOLIC PNL TOTAL CA: CPT

## 2022-03-28 PROCEDURE — 93010 ELECTROCARDIOGRAM REPORT: CPT | Performed by: INTERNAL MEDICINE

## 2022-03-28 PROCEDURE — 83036 HEMOGLOBIN GLYCOSYLATED A1C: CPT

## 2022-03-28 PROCEDURE — 93005 ELECTROCARDIOGRAM TRACING: CPT | Performed by: NURSE PRACTITIONER

## 2022-03-28 PROCEDURE — 86901 BLOOD TYPING SEROLOGIC RH(D): CPT

## 2022-03-28 PROCEDURE — 86900 BLOOD TYPING SEROLOGIC ABO: CPT

## 2022-03-28 PROCEDURE — 85027 COMPLETE CBC AUTOMATED: CPT

## 2022-03-28 PROCEDURE — 86850 RBC ANTIBODY SCREEN: CPT

## 2022-03-28 RX ORDER — SODIUM CHLORIDE, SODIUM LACTATE, POTASSIUM CHLORIDE, CALCIUM CHLORIDE 600; 310; 30; 20 MG/100ML; MG/100ML; MG/100ML; MG/100ML
INJECTION, SOLUTION INTRAVENOUS CONTINUOUS
Status: CANCELLED | OUTPATIENT
Start: 2022-04-04

## 2022-03-28 RX ORDER — SODIUM CHLORIDE 9 MG/ML
25 INJECTION, SOLUTION INTRAVENOUS PRN
Status: CANCELLED | OUTPATIENT
Start: 2022-04-04

## 2022-03-28 RX ORDER — SODIUM CHLORIDE 0.9 % (FLUSH) 0.9 %
10 SYRINGE (ML) INJECTION EVERY 12 HOURS SCHEDULED
Status: CANCELLED | OUTPATIENT
Start: 2022-04-04

## 2022-03-28 RX ORDER — SODIUM CHLORIDE 0.9 % (FLUSH) 0.9 %
10 SYRINGE (ML) INJECTION PRN
Status: CANCELLED | OUTPATIENT
Start: 2022-04-04

## 2022-03-28 RX ORDER — LIDOCAINE HYDROCHLORIDE 10 MG/ML
1 INJECTION, SOLUTION EPIDURAL; INFILTRATION; INTRACAUDAL; PERINEURAL
Status: CANCELLED | OUTPATIENT
Start: 2022-04-04 | End: 2022-04-04

## 2022-03-28 ASSESSMENT — ENCOUNTER SYMPTOMS
ABDOMINAL PAIN: 0
STRIDOR: 0
WHEEZING: 0
COUGH: 0
SORE THROAT: 0
NAUSEA: 0
CHEST TIGHTNESS: 1
ALLERGIC/IMMUNOLOGIC NEGATIVE: 1
TROUBLE SWALLOWING: 0
SHORTNESS OF BREATH: 1
VOMITING: 0
EYES NEGATIVE: 1
CONSTIPATION: 0
BACK PAIN: 1
DIARRHEA: 0

## 2022-03-28 NOTE — H&P
Nurse Practitioner History and Physical      CHIEF COMPLAINT: DUB    HISTORY OF PRESENT ILLNESS:     The patient is a 37 y.o. female with significant past medical history of DUB who presents for EUA, pap smear with hysteroscopy, D & C. . Nulliparous. LMP 3/19/2022. Duration flow 6 -7 days. Interval cycle monthly. Heavy menses > 5 years & worsening > 2 years. States soaks calos pad every hour. Vaginal bleeding with clots & pelvic pain & cramping. Unsuccessful pap smear in office. Pelvic US done. Scheduled for OR. Past Medical History:        Diagnosis Date    Depression     Diabetes mellitus ([address]     meds since age 26s    Hypertension     meds since 10/2021    Thyroid disease 2017    meds since age 36s     Past Surgical History:    Past Surgical History:   Procedure Laterality Date    KNEE SURGERY Right 2016    ligament repair    KNEE SURGERY Left     I & D due to infection at age 36s         Medications Prior to Admission:        Allergies:  Patient has no known allergies. Social History:   Social History     Socioeconomic History    Marital status: Single     Spouse name: Not on file    Number of children: Not on file    Years of education: Not on file    Highest education level: Not on file   Occupational History    Not on file   Tobacco Use    Smoking status: Never Smoker    Smokeless tobacco: Never Used   Vaping Use    Vaping Use: Never used   Substance and Sexual Activity    Alcohol use: No    Drug use: No    Sexual activity: Never   Other Topics Concern    Not on file   Social History Narrative    Not on file     Social Determinants of Health     Financial Resource Strain: Low Risk     Difficulty of Paying Living Expenses: Not hard at all   Food Insecurity: No Food Insecurity    Worried About [address] in the Last Year: Never true    [address] in the Last Year: Never true   Transportation Needs:     Lack of Transportation (Medical):  Not on file    Lack of Transportation (Non-Medical): Not on file   Physical Activity:     Days of Exercise per Week: Not on file    Minutes of Exercise per Session: Not on file   Stress:     Feeling of Stress : Not on file   Social Connections:     Frequency of Communication with Friends and Family: Not on file    Frequency of Social Gatherings with Friends and Family: Not on file    Attends Muslim Services: Not on file    Active Member of Clubs or Organizations: Not on file    Attends Club or Organization Meetings: Not on file    Marital Status: Not on file   Intimate Partner Violence:     Fear of Current or Ex-Partner: Not on file    Emotionally Abused: Not on file    Physically Abused: Not on file    Sexually Abused: Not on file   Housing Stability:     Unable to Pay for Housing in the Last Year: Not on file    Number of [place] in the Last Year: Not on file    Unstable Housing in the Last Year: Not on file       Family History:       Problem Relation Age of Onset    Diabetes Mother     High Blood Pressure Mother     Kidney Disease Mother     Stroke Mother     Diabetes Father     High Blood Pressure Father     Diabetes Sister     High Blood Pressure Sister     Other Sister         Legally blind due to diabetes    COPD Brother     Sleep Apnea Brother     High Cholesterol Brother        Review of Systems   Constitutional: Negative. Negative for chills and fever. HENT: Positive for nosebleeds (with hot weather). Negative for congestion, sore throat, tinnitus and trouble swallowing. Eyes: Negative. Negative for visual disturbance. Respiratory: Positive for chest tightness (with SOB) and shortness of breath (with exertion). Negative for cough, wheezing and stridor. Cardiovascular: Negative for chest pain and palpitations. Gastrointestinal: Negative for abdominal pain, constipation, diarrhea, nausea and vomiting.    Endocrine:        Diabetes & hypothyroid   Genitourinary: Positive for menstrual problem and pelvic pain. Negative for dysuria and frequency. Musculoskeletal: Positive for back pain (with pelvic pain). Negative for myalgias and neck pain. Skin: Negative. Allergic/Immunologic: Negative. Neurological: Negative. Negative for seizures and headaches. Hematological: Negative. Psychiatric/Behavioral: Negative. Vitals:  [facility] 03/19/2022     Physical Exam  Vitals reviewed. Constitutional:       Appearance: She is obese. HENT:      Head: Normocephalic and atraumatic. Right Ear: Tympanic membrane, ear canal and external ear normal.      Left Ear: Tympanic membrane, ear canal and external ear normal.      Nose: Nose normal.      Mouth/Throat:      Mouth: Mucous membranes are moist.   Eyes:      General: No scleral icterus. Extraocular Movements: Extraocular movements intact. Conjunctiva/sclera: Conjunctivae normal.      Pupils: Pupils are equal, round, and reactive to light. Cardiovascular:      Rate and Rhythm: Normal rate and regular rhythm. Heart sounds: No murmur heard. No friction rub. No gallop. Pulmonary:      Effort: Pulmonary effort is normal. No respiratory distress. Breath sounds: Normal breath sounds. No wheezing or rales. Abdominal:      General: Bowel sounds are normal.      Palpations: Abdomen is soft. There is no mass. Tenderness: There is no abdominal tenderness. Comments: Obese abdomen. Genitourinary:     Comments: Deferred. Musculoskeletal:         General: Normal range of motion. Cervical back: Normal range of motion and neck supple. Right lower leg: No edema. Left lower leg: No edema. Skin:     General: Skin is warm and dry. Comments: Hirsutism & flushed face. Neurological:      Mental Status: She is alert and oriented to person, place, and time.       Gait: Gait normal.   Psychiatric:         Mood and Affect: Mood normal.         Behavior: Behavior normal.         Thought Content: Thought content normal.         Judgment: Judgment normal.         [unfilled]    Assessment:  Patient Active Problem List   Diagnosis    Class 3 severe obesity without serious comorbidity with body mass index (BMI) of 40.0 to 44.9 in adult (HCC)    Hirsutism    Insulin resistance    ALDO (obstructive sleep apnea)    Oxygen desaturation during sleep    Chronic fatigue    Menorrhagia with regular cycle    Dysmenorrhea    Pre-diabetes    Hypothyroidism    Vitamin D deficiency    DUB (dysfunctional uterine bleeding)    SOB (shortness of breath)         Plan:  Scheduled for EUA, pap smear with hysteroscopy , D & C.        TAYLOR Ash CNP  3/28/2022  8:25 AM

## 2022-04-03 NOTE — H&P
I attest that I have reviewed the H&P with the patient. All risks, benefits and alternative therapies were reviewed and the patient agrees to proceed with plan of care.    Dee Wade, DO

## 2022-04-04 ENCOUNTER — ANESTHESIA (OUTPATIENT)
Dept: OPERATING ROOM | Age: 42
End: 2022-04-04
Payer: COMMERCIAL

## 2022-04-04 ENCOUNTER — HOSPITAL ENCOUNTER (OUTPATIENT)
Age: 42
Setting detail: OUTPATIENT SURGERY
Discharge: HOME | End: 2022-04-04
Attending: OBSTETRICS & GYNECOLOGY | Admitting: OBSTETRICS & GYNECOLOGY
Payer: COMMERCIAL

## 2022-04-04 VITALS
SYSTOLIC BLOOD PRESSURE: 132 MMHG | DIASTOLIC BLOOD PRESSURE: 83 MMHG | TEMPERATURE: 96.9 F | HEART RATE: 77 BPM | OXYGEN SATURATION: 94 % | HEIGHT: 66 IN | RESPIRATION RATE: 18 BRPM | WEIGHT: 256 LBS | BODY MASS INDEX: 41.14 KG/M2

## 2022-04-04 VITALS — OXYGEN SATURATION: 97 % | SYSTOLIC BLOOD PRESSURE: 154 MMHG | DIASTOLIC BLOOD PRESSURE: 87 MMHG | TEMPERATURE: 83.1 F

## 2022-04-04 LAB
GLUCOSE BLD-MCNC: 108 MG/DL (ref 70–99)
GLUCOSE BLD-MCNC: 96 MG/DL (ref 70–99)
HCG, URINE, POC: NEGATIVE
Lab: NORMAL
NEGATIVE QC PASS/FAIL: NORMAL
PERFORMED ON: ABNORMAL
PERFORMED ON: NORMAL
POSITIVE QC PASS/FAIL: NORMAL

## 2022-04-04 PROCEDURE — 3700000000 HC ANESTHESIA ATTENDED CARE: Performed by: OBSTETRICS & GYNECOLOGY

## 2022-04-04 PROCEDURE — 88342 IMHCHEM/IMCYTCHM 1ST ANTB: CPT

## 2022-04-04 PROCEDURE — 88175 CYTOPATH C/V AUTO FLUID REDO: CPT

## 2022-04-04 PROCEDURE — 2580000003 HC RX 258: Performed by: OBSTETRICS & GYNECOLOGY

## 2022-04-04 PROCEDURE — 88305 TISSUE EXAM BY PATHOLOGIST: CPT

## 2022-04-04 PROCEDURE — 3600000014 HC SURGERY LEVEL 4 ADDTL 15MIN: Performed by: OBSTETRICS & GYNECOLOGY

## 2022-04-04 PROCEDURE — 7100000001 HC PACU RECOVERY - ADDTL 15 MIN: Performed by: OBSTETRICS & GYNECOLOGY

## 2022-04-04 PROCEDURE — 6360000002 HC RX W HCPCS: Performed by: ANESTHESIOLOGY

## 2022-04-04 PROCEDURE — 7100000000 HC PACU RECOVERY - FIRST 15 MIN: Performed by: OBSTETRICS & GYNECOLOGY

## 2022-04-04 PROCEDURE — 3700000001 HC ADD 15 MINUTES (ANESTHESIA): Performed by: OBSTETRICS & GYNECOLOGY

## 2022-04-04 PROCEDURE — 88341 IMHCHEM/IMCYTCHM EA ADD ANTB: CPT

## 2022-04-04 PROCEDURE — 3600000004 HC SURGERY LEVEL 4 BASE: Performed by: OBSTETRICS & GYNECOLOGY

## 2022-04-04 PROCEDURE — 2709999900 HC NON-CHARGEABLE SUPPLY: Performed by: OBSTETRICS & GYNECOLOGY

## 2022-04-04 PROCEDURE — 6360000002 HC RX W HCPCS: Performed by: NURSE PRACTITIONER

## 2022-04-04 PROCEDURE — 58558 HYSTEROSCOPY BIOPSY: CPT | Performed by: OBSTETRICS & GYNECOLOGY

## 2022-04-04 PROCEDURE — 2580000003 HC RX 258: Performed by: NURSE PRACTITIONER

## 2022-04-04 RX ORDER — SODIUM CHLORIDE 0.9 % (FLUSH) 0.9 %
5-40 SYRINGE (ML) INJECTION PRN
Status: DISCONTINUED | OUTPATIENT
Start: 2022-04-04 | End: 2022-04-04 | Stop reason: HOSPADM

## 2022-04-04 RX ORDER — MAGNESIUM HYDROXIDE 1200 MG/15ML
LIQUID ORAL CONTINUOUS PRN
Status: COMPLETED | OUTPATIENT
Start: 2022-04-04 | End: 2022-04-04

## 2022-04-04 RX ORDER — MEPERIDINE HYDROCHLORIDE 25 MG/ML
12.5 INJECTION INTRAMUSCULAR; INTRAVENOUS; SUBCUTANEOUS EVERY 5 MIN PRN
Status: DISCONTINUED | OUTPATIENT
Start: 2022-04-04 | End: 2022-04-04 | Stop reason: HOSPADM

## 2022-04-04 RX ORDER — SODIUM CHLORIDE 0.9 % (FLUSH) 0.9 %
10 SYRINGE (ML) INJECTION PRN
Status: DISCONTINUED | OUTPATIENT
Start: 2022-04-04 | End: 2022-04-04 | Stop reason: HOSPADM

## 2022-04-04 RX ORDER — OXYCODONE HYDROCHLORIDE 5 MG/1
5 TABLET ORAL PRN
Status: DISCONTINUED | OUTPATIENT
Start: 2022-04-04 | End: 2022-04-04 | Stop reason: HOSPADM

## 2022-04-04 RX ORDER — METOCLOPRAMIDE HYDROCHLORIDE 5 MG/ML
10 INJECTION INTRAMUSCULAR; INTRAVENOUS
Status: DISCONTINUED | OUTPATIENT
Start: 2022-04-04 | End: 2022-04-04 | Stop reason: HOSPADM

## 2022-04-04 RX ORDER — SODIUM CHLORIDE 9 MG/ML
25 INJECTION, SOLUTION INTRAVENOUS PRN
Status: DISCONTINUED | OUTPATIENT
Start: 2022-04-04 | End: 2022-04-04 | Stop reason: HOSPADM

## 2022-04-04 RX ORDER — LIDOCAINE HYDROCHLORIDE 20 MG/ML
INJECTION, SOLUTION INTRAVENOUS PRN
Status: DISCONTINUED | OUTPATIENT
Start: 2022-04-04 | End: 2022-04-04 | Stop reason: SDUPTHER

## 2022-04-04 RX ORDER — ONDANSETRON 2 MG/ML
INJECTION INTRAMUSCULAR; INTRAVENOUS PRN
Status: DISCONTINUED | OUTPATIENT
Start: 2022-04-04 | End: 2022-04-04 | Stop reason: SDUPTHER

## 2022-04-04 RX ORDER — DEXAMETHASONE SODIUM PHOSPHATE 4 MG/ML
INJECTION, SOLUTION INTRA-ARTICULAR; INTRALESIONAL; INTRAMUSCULAR; INTRAVENOUS; SOFT TISSUE PRN
Status: DISCONTINUED | OUTPATIENT
Start: 2022-04-04 | End: 2022-04-04 | Stop reason: SDUPTHER

## 2022-04-04 RX ORDER — OXYCODONE HYDROCHLORIDE 5 MG/1
10 TABLET ORAL PRN
Status: DISCONTINUED | OUTPATIENT
Start: 2022-04-04 | End: 2022-04-04 | Stop reason: HOSPADM

## 2022-04-04 RX ORDER — MIDAZOLAM HYDROCHLORIDE 1 MG/ML
INJECTION INTRAMUSCULAR; INTRAVENOUS PRN
Status: DISCONTINUED | OUTPATIENT
Start: 2022-04-04 | End: 2022-04-04 | Stop reason: SDUPTHER

## 2022-04-04 RX ORDER — KETOROLAC TROMETHAMINE 30 MG/ML
INJECTION, SOLUTION INTRAMUSCULAR; INTRAVENOUS PRN
Status: DISCONTINUED | OUTPATIENT
Start: 2022-04-04 | End: 2022-04-04 | Stop reason: SDUPTHER

## 2022-04-04 RX ORDER — PROPOFOL 10 MG/ML
INJECTION, EMULSION INTRAVENOUS PRN
Status: DISCONTINUED | OUTPATIENT
Start: 2022-04-04 | End: 2022-04-04 | Stop reason: SDUPTHER

## 2022-04-04 RX ORDER — LIDOCAINE HYDROCHLORIDE 10 MG/ML
1 INJECTION, SOLUTION EPIDURAL; INFILTRATION; INTRACAUDAL; PERINEURAL
Status: DISCONTINUED | OUTPATIENT
Start: 2022-04-04 | End: 2022-04-04 | Stop reason: HOSPADM

## 2022-04-04 RX ORDER — SODIUM CHLORIDE, SODIUM LACTATE, POTASSIUM CHLORIDE, CALCIUM CHLORIDE 600; 310; 30; 20 MG/100ML; MG/100ML; MG/100ML; MG/100ML
INJECTION, SOLUTION INTRAVENOUS CONTINUOUS
Status: DISCONTINUED | OUTPATIENT
Start: 2022-04-04 | End: 2022-04-04 | Stop reason: HOSPADM

## 2022-04-04 RX ORDER — FENTANYL CITRATE 50 UG/ML
50 INJECTION, SOLUTION INTRAMUSCULAR; INTRAVENOUS EVERY 10 MIN PRN
Status: DISCONTINUED | OUTPATIENT
Start: 2022-04-04 | End: 2022-04-04 | Stop reason: HOSPADM

## 2022-04-04 RX ORDER — SODIUM CHLORIDE 0.9 % (FLUSH) 0.9 %
5-40 SYRINGE (ML) INJECTION EVERY 12 HOURS SCHEDULED
Status: DISCONTINUED | OUTPATIENT
Start: 2022-04-04 | End: 2022-04-04 | Stop reason: HOSPADM

## 2022-04-04 RX ORDER — SODIUM CHLORIDE 0.9 % (FLUSH) 0.9 %
10 SYRINGE (ML) INJECTION EVERY 12 HOURS SCHEDULED
Status: DISCONTINUED | OUTPATIENT
Start: 2022-04-04 | End: 2022-04-04 | Stop reason: HOSPADM

## 2022-04-04 RX ORDER — DIPHENHYDRAMINE HYDROCHLORIDE 50 MG/ML
INJECTION INTRAMUSCULAR; INTRAVENOUS PRN
Status: DISCONTINUED | OUTPATIENT
Start: 2022-04-04 | End: 2022-04-04 | Stop reason: SDUPTHER

## 2022-04-04 RX ADMIN — LIDOCAINE HYDROCHLORIDE 50 MG: 20 INJECTION, SOLUTION INTRAVENOUS at 09:43

## 2022-04-04 RX ADMIN — MIDAZOLAM HYDROCHLORIDE 2 MG: 1 INJECTION, SOLUTION INTRAMUSCULAR; INTRAVENOUS at 09:35

## 2022-04-04 RX ADMIN — DIPHENHYDRAMINE HYDROCHLORIDE 12.5 MG: 50 INJECTION, SOLUTION INTRAMUSCULAR; INTRAVENOUS at 09:53

## 2022-04-04 RX ADMIN — ONDANSETRON 4 MG: 2 INJECTION INTRAMUSCULAR; INTRAVENOUS at 10:14

## 2022-04-04 RX ADMIN — PROPOFOL 250 MG: 10 INJECTION, EMULSION INTRAVENOUS at 09:43

## 2022-04-04 RX ADMIN — SODIUM CHLORIDE, POTASSIUM CHLORIDE, SODIUM LACTATE AND CALCIUM CHLORIDE: 600; 310; 30; 20 INJECTION, SOLUTION INTRAVENOUS at 08:11

## 2022-04-04 RX ADMIN — DEXAMETHASONE SODIUM PHOSPHATE 4 MG: 4 INJECTION, SOLUTION INTRAMUSCULAR; INTRAVENOUS at 09:53

## 2022-04-04 RX ADMIN — KETOROLAC TROMETHAMINE 30 MG: 30 INJECTION, SOLUTION INTRAMUSCULAR at 10:14

## 2022-04-04 RX ADMIN — CEFAZOLIN SODIUM 2000 MG: 10 INJECTION, POWDER, FOR SOLUTION INTRAVENOUS at 09:45

## 2022-04-04 ASSESSMENT — PULMONARY FUNCTION TESTS
PIF_VALUE: 11
PIF_VALUE: 11
PIF_VALUE: 23
PIF_VALUE: 12
PIF_VALUE: 12
PIF_VALUE: 11
PIF_VALUE: 18
PIF_VALUE: 11
PIF_VALUE: 11
PIF_VALUE: 36
PIF_VALUE: 11
PIF_VALUE: 11
PIF_VALUE: 10
PIF_VALUE: 11
PIF_VALUE: 12
PIF_VALUE: 11
PIF_VALUE: 14
PIF_VALUE: 1
PIF_VALUE: 28
PIF_VALUE: 11
PIF_VALUE: 7
PIF_VALUE: 11
PIF_VALUE: 11
PIF_VALUE: 10
PIF_VALUE: 11
PIF_VALUE: 11
PIF_VALUE: 1
PIF_VALUE: 12
PIF_VALUE: 11
PIF_VALUE: 12
PIF_VALUE: 11
PIF_VALUE: 0
PIF_VALUE: 0
PIF_VALUE: 11
PIF_VALUE: 18
PIF_VALUE: 12
PIF_VALUE: 0
PIF_VALUE: 12
PIF_VALUE: 11

## 2022-04-04 ASSESSMENT — PAIN - FUNCTIONAL ASSESSMENT: PAIN_FUNCTIONAL_ASSESSMENT: 0-10

## 2022-04-04 ASSESSMENT — ENCOUNTER SYMPTOMS: SHORTNESS OF BREATH: 1

## 2022-04-04 NOTE — ANESTHESIA POSTPROCEDURE EVALUATION
Department of Anesthesiology  Postprocedure Note    Patient: Africa Elder  MRN: [id number]  YOB: 1980  Date of evaluation: 4/4/2022  Time:  1:33 PM     Procedure Summary     Date: 04/04/22 Room / Location: [address] [place] [address]    Anesthesia Start: 4721 Anesthesia Stop: 3288    Procedures:       EVALUATION UNDER ANESTHESIA  PAP SMEAR (N/A )      WITH  HYSTEROSCOPY  DILATATION AND CURETTAGE (N/A ) Diagnosis: (DUB)    Surgeons: Apolinar Rucker DO Responsible Provider: Lien Yu MD    Anesthesia Type: general ASA Status: 3          Anesthesia Type: general    Akbar Phase I: Akbar Score: 10    Akbar Phase II:      Last vitals: Reviewed and per EMR flowsheets.        Anesthesia Post Evaluation    Patient location during evaluation: PACU  Patient participation: complete - patient participated  Level of consciousness: awake  Pain score: 1  Airway patency: patent  Nausea & Vomiting: no nausea and no vomiting  Complications: no  Cardiovascular status: blood pressure returned to baseline  Respiratory status: acceptable  Hydration status: euvolemic

## 2022-04-04 NOTE — OP NOTE
Operative Note      Patient: Sky Meyers  YOB: 1980  MRN: [id number]    Date of Procedure: 4/4/2022    Pre-Op Diagnosis: DUB                                Severe examination anxiety    Post-Op Diagnosis: Same       Procedure(s):  EVALUATION UNDER ANESTHESIA  PAP SMEAR  WITH  HYSTEROSCOPY  DILATATION AND CURETTAGE    Surgeon(s):  Edwina Donnelly DO    Assistant:   * No surgical staff found *    Anesthesia: Monitor Anesthesia Care    Estimated Blood Loss (mL): less than 50     Complications: None    Specimens:   ID Type Source Tests Collected by Time Destination   A :  Tissue Cervix PAP SMEAR Meek Grant,  4/4/2022 1005    B : [facility] Tissue Endometrium SURGICAL PATHOLOGY Edwina Donnelly,  4/4/2022 1008        Implants:  * No implants in log *      Drains: * No LDAs found *    Findings: large amount of endometrial tissue with some polyp appearing tissue  Uterine prolapse  Large amount of unprovoked urine loss throughout entire procedure    Detailed Description of Procedure:   OPERATIVE NOTE: HYSTEROSCOPY, D&C    37 y.o. Esperanza Anastacio who presents today with h/o persistent menometrorrhagia. Patient w/o trial of medical therapy  Due to inability to obtain office sampling. Patient was very anxious and desires to have surgical evaluation and pap smear. The patient with pre-operative evaluation with ultrasound showing thickened endometrium. patient was not comfortable with vaginal exam in office and required operative suite evaluation. Risks, benefits and alternative therapies for treatment were discussed with the patient. The patient desires surgical management of MMR and patient planned for hysteroscopy, D&C. And pap smear        The patient was taken to the OR where she was prepped and draped in the normal sterile fashion in a dorsal lithotomy position. Time out was then taken to ensure proper patient, placement and procedure. Thin prep pap done without difficulty.   A weighted/Bivalved speculum was placed along with right angle retractor, Thin prep pap done without difficulty and then cervix was grasped with a double toothed tenaculum. The uterus was then sounded to 9cm. The cervix was dilated using Hegar dilators to a accommodate hysteroscope. . The myosure  hysteroscope was then introduced into the uterus. The endometrium was noted to be with thickened tissue and some polyp changes. Bilateral ostia were visualized and noted to be normal. The ott curette was introduced and endometrial  curettings were performed and sent to pathology. The Myosure device was then removed without difficulty. All instruments were then removed. Sponge and instrument counts were correct x 3 and the patient was taken to recovery in stable condition.      Dianne Ghotra DO      Electronically signed by Dianne Ghotra DO on 4/4/2022 at 10:18 AM

## 2022-04-04 NOTE — ANESTHESIA PRE PROCEDURE
Department of Anesthesiology  Preprocedure Note       Name:  Man Higgins   Age:  37 y.o.  :  1980                                          MRN:  [id number]         Date:  2022      Surgeon: Bryanna Watson):  Irina Samano DO    Procedure: Procedure(s):  EVALUATION UNDER ANESTHESIA  PAP SMEAR  WITH  HYSTEROSCOPY  DILATATION AND CURETTAGE, TO BE LATEX FREE    Medications prior to admission:   Prior to Admission medications    Medication Sig Start Date End Date Taking? Authorizing Provider   traZODone (DESYREL) 50 MG tablet TAKE 1 TABLET BY MOUTH EVERY NIGHT AT BEDTIME AS NEEDED FOR SLEEP 3/21/22   Jocelyn William PA-C   glucose monitoring (FREESTYLE) kit 1 kit by Does not apply route daily 22   Jocelyn William PA-C   Blood Pressure KIT 1 Device by Does not apply route 2 times daily 22   Jocelyn Wliliam PA-C   Incontinence Supply Disposable (WINGS ADULT BRIEFS XXL) MISC Indications: Excessive Amount of Menstrual Volume Use three times daily as needed.  22   Jocelyn William PA-C   blood glucose test strips (FREESTYLE LITE) strip USE TO TEST BLOOD SUGAR TWICE DAILY AND AS NEEDED AS DIRECTED FOR SYMPTOMS OF IRREGULAR BLOOD SUGAR 22   Jocelyn William PA-C   losartan (COZAAR) 25 MG tablet TAKE 1 TABLET BY MOUTH DAILY 21   Jocelyn William PA-C   metFORMIN (GLUCOPHAGE) 500 MG tablet TAKE 1/2 TABLET BY MOUTH WITH BREAKFAST AND TAKE 1/2 TABLET BY MOUTH WITH DINNER 10/8/21   Shayla Hunter MD   levothyroxine (SYNTHROID) 25 MCG tablet TAKE 1 TABLET BY MOUTH DAILY ON AN EMPTY STOMACH 30 MINUTES BEFORE OTHER MEDICATIONS AND FOOD 10/8/21   Shayla Hunter MD   vitamin D (ERGOCALCIFEROL) 1.25 MG (14870 UT) CAPS capsule TAKE 1 CAPSULE A DAY EVERY MON 10/8/21   Shayla Hunter MD   albuterol sulfate  (90 Base) MCG/ACT inhaler INHALE TWO (2) PUFFS BY MOUTH EVERY 4 HOURS AS NEEDED FOR WHEEZING OR FOR SHORTNESS OF BREATH 21   Jocelny William PA-C   FreeStyle Lancets MISC USE AS DIRECTED TO TEST BLOOD SUGAR DAILY 6/4/21   Jocelyn William PA-C   Alcohol Swabs (EASY TOUCH ALCOHOL PREP MEDIUM) 70 % PADS USE AS DIRECTED. 5/4/21   Jocelyn William PA-C   ibuprofen (ADVIL;MOTRIN) 800 MG tablet Take 1 tablet by mouth every 8 hours as needed for Pain 12/6/19   Haydee Rees PA-C   Incontinence Supply Disposable (ATTENDS BRIEFS LARGE) MISC Use monthly, 2-3 briefs daily for menorrhagia.  8/15/19   Jocelyn William PA-C       Current medications:    Current Facility-Administered Medications   Medication Dose Route Frequency Provider Last Rate Last Admin    0.9 % sodium chloride infusion  25 mL IntraVENous PRN Britney Din, APRN - CNP        lactated ringers infusion   IntraVENous Continuous Britney Din, APRN -  mL/hr at 04/04/22 0811 New Bag at 04/04/22 0811    lidocaine PF 1 % injection 1 mL  1 mL IntraDERmal Once PRN Britney Din, APRN - CNP        sodium chloride flush 0.9 % injection 10 mL  10 mL IntraVENous 2 times per day Britney Din, APRN - CNP        sodium chloride flush 0.9 % injection 10 mL  10 mL IntraVENous PRN Britney Din, APRN - CNP        ceFAZolin (ANCEF) 2000 mg in dextrose 5 % 100 mL IVPB  2,000 mg IntraVENous Once Britney Din, APRN - CNP         Facility-Administered Medications Ordered in Other Encounters   Medication Dose Route Frequency Provider Last Rate Last Admin    cefTRIAXone (ROCEPHIN) 2 g IVPB in D5W 50ml minibag  2 g IntraVENous Once Unknown Provider Result           Allergies:  No Known Allergies    Problem List:    Patient Active Problem List   Diagnosis Code    Class 3 severe obesity without serious comorbidity with body mass index (BMI) of 40.0 to 44.9 in adult ([facility] [address] E66.01, Z68.41    Hirsutism L68.0    Insulin resistance E88.81    ALDO (obstructive sleep apnea) G47.33    Oxygen desaturation during sleep G47.34    Chronic fatigue R53.82    Menorrhagia with regular cycle N92.0    Dysmenorrhea N94.6    Pre-diabetes R73.03    >60.0 03/28/2022    GLUCOSE 107 03/28/2022    PROT 6.8 11/02/2020    CALCIUM 9.9 03/28/2022    BILITOT 0.5 11/02/2020    ALKPHOS 78 11/02/2020    AST 42 11/02/2020    ALT 55 11/02/2020       POC Tests:   Recent Labs     04/04/22  8494   POCGLU 108*       Coags:   Lab Results   Component Value Date    PROTIME 13.4 02/14/2020    INR 1.0 02/14/2020    APTT 27.3 02/14/2020       HCG (If Applicable):   Lab Results   Component Value Date    PREGTESTUR Negative 07/13/2017        ABGs: No results found for: PHART, PO2ART, XOC0PNB, FEE5AYH, BEART, M1FKVAGF     Type & Screen (If Applicable):  No results found for: LABABO, LABRH    Drug/Infectious Status (If Applicable):  Lab Results   Component Value Date    HEPCAB NONREACTIVE 02/02/2022       COVID-19 Screening (If Applicable): No results found for: COVID19        Anesthesia Evaluation    Airway: Mallampati: II  TM distance: >3 FB   Neck ROM: full  Mouth opening: > = 3 FB Dental: normal exam         Pulmonary:normal exam    (+) shortness of breath: no interval change and chronic,  sleep apnea: on noncompliant,                             Cardiovascular:    (+) hypertension:,       ECG reviewed               Beta Blocker:  Not on Beta Blocker         Neuro/Psych:               GI/Hepatic/Renal:             Endo/Other:    (+) DiabetesType II DM, , hypothyroidism::., .                 Abdominal:   (+) obese,           Vascular: Other Findings:             Anesthesia Plan      general     ASA 3       Induction: intravenous. MIPS: Postoperative opioids intended and Prophylactic antiemetics administered. Anesthetic plan and risks discussed with patient.         Attending anesthesiologist reviewed and agrees with Preprocedure content              Henri Morillo MD   4/4/2022

## 2022-04-06 ENCOUNTER — TELEPHONE (OUTPATIENT)
Dept: OBGYN CLINIC | Age: 42
End: 2022-04-06

## 2022-04-06 NOTE — TELEPHONE ENCOUNTER
Left message for pt to call to set up post operative appt . Can be virtual 2 week post her surgery 4-5-22.  Dr. Dejesus Cough like someone to be there with her during the call

## 2022-04-12 DIAGNOSIS — E66.01 MORBID OBESITY WITH BMI OF 40.0-44.9, ADULT (HCC): ICD-10-CM

## 2022-04-12 DIAGNOSIS — R06.02 SOB (SHORTNESS OF BREATH) ON EXERTION: ICD-10-CM

## 2022-04-12 DIAGNOSIS — R73.03 PRE-DIABETES: ICD-10-CM

## 2022-04-12 DIAGNOSIS — E88.81 INSULIN RESISTANCE: ICD-10-CM

## 2022-04-12 DIAGNOSIS — I10 HYPERTENSION, UNSPECIFIED TYPE: ICD-10-CM

## 2022-04-12 DIAGNOSIS — E55.9 VITAMIN D DEFICIENCY: ICD-10-CM

## 2022-04-12 RX ORDER — ISOPROPYL ALCOHOL 70 ML/100ML
SWAB TOPICAL
Qty: 100 EACH | Refills: 10 | Status: SHIPPED | OUTPATIENT
Start: 2022-04-12 | End: 2022-04-28

## 2022-04-12 RX ORDER — BLOOD-GLUCOSE METER
KIT MISCELLANEOUS
Qty: 100 STRIP | Refills: 3 | Status: SHIPPED | OUTPATIENT
Start: 2022-04-12

## 2022-04-12 RX ORDER — ERGOCALCIFEROL 1.25 MG/1
CAPSULE ORAL
Qty: 4 CAPSULE | Refills: 10 | Status: SHIPPED | OUTPATIENT
Start: 2022-04-12 | End: 2022-05-13 | Stop reason: ALTCHOICE

## 2022-04-12 RX ORDER — ALBUTEROL SULFATE 90 UG/1
AEROSOL, METERED RESPIRATORY (INHALATION)
Qty: 8.5 G | Refills: 10 | Status: SHIPPED | OUTPATIENT
Start: 2022-04-12 | End: 2022-05-06

## 2022-04-12 RX ORDER — LOSARTAN POTASSIUM 25 MG/1
25 TABLET ORAL DAILY
Qty: 30 TABLET | Refills: 10 | Status: SHIPPED | OUTPATIENT
Start: 2022-04-12

## 2022-04-12 NOTE — TELEPHONE ENCOUNTER
Rx request   Requested Prescriptions     Pending Prescriptions Disp Refills    metFORMIN (GLUCOPHAGE) 500 MG tablet 30 tablet 10    Alcohol Swabs (EASY TOUCH ALCOHOL PREP MEDIUM) 70 % PADS 100 each 10    albuterol sulfate  (90 Base) MCG/ACT inhaler 8.5 g 10    blood glucose test strips (FREESTYLE LITE) strip 100 strip 3     Sig: USE TO TEST BLOOD SUGAR TWICE DAILY AND AS NEEDED AS DIRECTED FOR SYMPTOMS OF IRREGULAR BLOOD SUGAR    losartan (COZAAR) 25 MG tablet 30 tablet 10     Sig: Take 1 tablet by mouth daily    vitamin D (ERGOCALCIFEROL) 1.25 MG (23381 UT) CAPS capsule 4 capsule 10     LOV 9/25/2020  Next Visit Date:  No future appointments.

## 2022-04-26 ENCOUNTER — TELEMEDICINE (OUTPATIENT)
Dept: OBGYN CLINIC | Age: 42
End: 2022-04-26
Payer: COMMERCIAL

## 2022-04-26 DIAGNOSIS — C54.1 ADENOCARCINOMA OF THE ENDOMETRIUM/UTERUS (HCC): Primary | ICD-10-CM

## 2022-04-26 DIAGNOSIS — R73.03 PRE-DIABETES: ICD-10-CM

## 2022-04-26 PROCEDURE — 99442 PR PHYS/QHP TELEPHONE EVALUATION 11-20 MIN: CPT | Performed by: OBSTETRICS & GYNECOLOGY

## 2022-04-26 NOTE — PROGRESS NOTES
Shiv Jimenez is a 37 y.o. female evaluated via telephone on 4/26/2022 for No chief complaint on file. .        Documentation:  I communicated with the patient and/or health care decision maker about pathology result. Details of this discussion including any medical advice provided: Patient is status post hysteroscopy D&C for dysfunctional uterine bleeding first week of April. Patient continued to have prolonged bleeding last cycle was heavy however only lasted 6 days. Patient has not been assessable by phone and finally were able to get a hold of her to discuss her results. Patient is endometrium is consistent with adenocarcinoma and patient was informed that she will be referred to GYN oncology at [facility]. This consult will be sent today patient is to be cognizant that they will be calling to schedule her appointment as she does not often answer her phone calls I will send this information as well to her primary care physician    Total Time: minutes: 11-20 minutes    Shiv Jimenez was evaluated through a synchronous (real-time) audio encounter. Patient identification was verified at the start of the visit. She (or guardian if applicable) is aware that this is a billable service, which includes applicable co-pays. This visit was conducted with the patient's (and/or legal guardian's) verbal consent. She has not had a related appointment within my department in the past 7 days or scheduled within the next 24 hours. The patient was located in a state where the provider was licensed to provide care.     Note: not billable if this call serves to triage the patient into an appointment for the relevant concern    Elizabeth Hopkins DO

## 2022-04-27 NOTE — TELEPHONE ENCOUNTER
Rx request   Requested Prescriptions     Pending Prescriptions Disp Refills    Alcohol Swabs (EASY TOUCH ALCOHOL PREP MEDIUM) 70 % PADS [Pharmacy Med Name: ALCOHOL PREP PADS 100CT PAD]  10     Sig: USE AS DIRECTED.      LOV 9/25/2020  Next Visit Date:  Future Appointments   Date Time Provider Erick Davies   5/13/2022 11:15 AM RAHEL Edwards [facility]

## 2022-04-28 RX ORDER — ISOPROPYL ALCOHOL 70 ML/100ML
SWAB TOPICAL
Qty: 100 EACH | Refills: 10 | Status: SHIPPED | OUTPATIENT
Start: 2022-04-28

## 2022-05-05 DIAGNOSIS — R73.03 PRE-DIABETES: ICD-10-CM

## 2022-05-05 RX ORDER — LANCETS 28 GAUGE
EACH MISCELLANEOUS
Qty: 100 EACH | Refills: 10 | Status: SHIPPED | OUTPATIENT
Start: 2022-05-05 | End: 2022-05-13

## 2022-05-06 DIAGNOSIS — R06.02 SOB (SHORTNESS OF BREATH) ON EXERTION: ICD-10-CM

## 2022-05-06 DIAGNOSIS — G47.00 INSOMNIA, UNSPECIFIED TYPE: ICD-10-CM

## 2022-05-06 RX ORDER — ALBUTEROL SULFATE 90 UG/1
AEROSOL, METERED RESPIRATORY (INHALATION)
Qty: 8.5 G | Refills: 10 | Status: SHIPPED | OUTPATIENT
Start: 2022-05-06

## 2022-05-06 NOTE — TELEPHONE ENCOUNTER
pharmacy requesting medication refill.  Please approve or deny this request.    Rx requested:  Requested Prescriptions     Pending Prescriptions Disp Refills    albuterol sulfate  (90 Base) MCG/ACT inhaler [Pharmacy Med Name: ALBUTEROL HFA *PROA* 90MCG 108 (90 BAS Aerosol] 8.5 g 10     Sig: INHALE TWO (2) PUFFS BY MOUTH EVERY 4 HOURS AS NEEDED FOR WHEEZING OR FOR SHORTNESS OF BREATH         Last Office Visit:   9/25/2020      Next Visit Date:  Future Appointments   Date Time Provider Erick Davies   5/13/2022 11:15 AM Janeth Rajan PA-C [facility]

## 2022-05-06 NOTE — TELEPHONE ENCOUNTER
pharmacy requesting medication refill.  Please approve or deny this request.    Rx requested:  Requested Prescriptions     Pending Prescriptions Disp Refills    traZODone (DESYREL) 50 MG tablet [Pharmacy Med Name: TRAZODONE 50 MG TABS 50 Tablet] 30 tablet 10     Sig: TAKE 1 TABLET BY MOUTH EVERY NIGHT AT BEDTIME AS NEEDED FOR SLEEP         Last Office Visit:   9/25/2020      Next Visit Date:  Future Appointments   Date Time Provider Erick Davies   5/13/2022 11:15 AM RAHEL Majano [facility] [place]

## 2022-05-09 RX ORDER — TRAZODONE HYDROCHLORIDE 50 MG/1
TABLET ORAL
Qty: 30 TABLET | Refills: 10 | Status: SHIPPED | OUTPATIENT
Start: 2022-05-09

## 2022-05-12 DIAGNOSIS — R73.03 PRE-DIABETES: ICD-10-CM

## 2022-05-13 ENCOUNTER — OFFICE VISIT (OUTPATIENT)
Dept: FAMILY MEDICINE CLINIC | Age: 42
End: 2022-05-13
Payer: COMMERCIAL

## 2022-05-13 VITALS
WEIGHT: 256 LBS | BODY MASS INDEX: 41.14 KG/M2 | OXYGEN SATURATION: 100 % | RESPIRATION RATE: 16 BRPM | HEIGHT: 66 IN | HEART RATE: 91 BPM | SYSTOLIC BLOOD PRESSURE: 138 MMHG | DIASTOLIC BLOOD PRESSURE: 100 MMHG

## 2022-05-13 DIAGNOSIS — E55.9 VITAMIN D DEFICIENCY: ICD-10-CM

## 2022-05-13 DIAGNOSIS — C55 ADENOCARCINOMA OF UTERUS (HCC): Primary | ICD-10-CM

## 2022-05-13 DIAGNOSIS — R73.03 PRE-DIABETES: ICD-10-CM

## 2022-05-13 DIAGNOSIS — E66.01 CLASS 3 SEVERE OBESITY DUE TO EXCESS CALORIES WITHOUT SERIOUS COMORBIDITY WITH BODY MASS INDEX (BMI) OF 40.0 TO 44.9 IN ADULT (HCC): ICD-10-CM

## 2022-05-13 DIAGNOSIS — F43.0 ACUTE STRESS DISORDER: ICD-10-CM

## 2022-05-13 DIAGNOSIS — R03.0 ELEVATED BLOOD PRESSURE READING: ICD-10-CM

## 2022-05-13 DIAGNOSIS — R53.82 CHRONIC FATIGUE: ICD-10-CM

## 2022-05-13 PROCEDURE — G8417 CALC BMI ABV UP PARAM F/U: HCPCS | Performed by: PHYSICIAN ASSISTANT

## 2022-05-13 PROCEDURE — 99214 OFFICE O/P EST MOD 30 MIN: CPT | Performed by: PHYSICIAN ASSISTANT

## 2022-05-13 PROCEDURE — G8427 DOCREV CUR MEDS BY ELIG CLIN: HCPCS | Performed by: PHYSICIAN ASSISTANT

## 2022-05-13 PROCEDURE — 1036F TOBACCO NON-USER: CPT | Performed by: PHYSICIAN ASSISTANT

## 2022-05-13 RX ORDER — BUSPIRONE HYDROCHLORIDE 7.5 MG/1
7.5 TABLET ORAL 2 TIMES DAILY
Qty: 60 TABLET | Refills: 5 | Status: SHIPPED | OUTPATIENT
Start: 2022-05-13 | End: 2022-10-14

## 2022-05-13 RX ORDER — LANCETS 28 GAUGE
EACH MISCELLANEOUS
Qty: 100 EACH | Refills: 10 | Status: SHIPPED | OUTPATIENT
Start: 2022-05-13 | End: 2022-06-03

## 2022-05-13 RX ORDER — IRON HEME POLYPEPTIDE/FOLIC AC 12-1MG
5000 TABLET ORAL DAILY
Qty: 90 CAPSULE | Refills: 4 | Status: SHIPPED | OUTPATIENT
Start: 2022-05-13

## 2022-05-13 RX ORDER — VITAMIN B COMPLEX/FOLIC ACID 0.4 MG
1 TABLET ORAL DAILY
Qty: 90 TABLET | Refills: 4 | Status: SHIPPED | OUTPATIENT
Start: 2022-05-13

## 2022-05-13 RX ORDER — ALPRAZOLAM 0.25 MG/1
0.25 TABLET ORAL 3 TIMES DAILY PRN
Qty: 30 TABLET | Refills: 1 | Status: SHIPPED | OUTPATIENT
Start: 2022-05-13 | End: 2022-06-12

## 2022-05-13 ASSESSMENT — ENCOUNTER SYMPTOMS
WHEEZING: 0
COUGH: 0
ABDOMINAL PAIN: 0
TROUBLE SWALLOWING: 0
DIARRHEA: 0
NAUSEA: 0
CHEST TIGHTNESS: 0
ABDOMINAL DISTENTION: 0
VOMITING: 0
SHORTNESS OF BREATH: 0

## 2022-05-13 NOTE — PROGRESS NOTES
Subjective  Otoniel Abdul, 37 y.o. female presents today with:  Chief Complaint   Patient presents with    Follow-up     no concerns per pt      HPI  In the office today for routine follow up. Last OV with me: 9/25/2020    DUB. Had endometrial biopsy with Dr. Gabino Pastor on 4/24/2022 for DUB. Pathology report was positive for adenocarcinoma of the uterus. She was referred to [facility] gyn oncology. Has upcoming appointment on 5/23/22 at [facility]. Needing address and where to go as information was to be mailed to her. She has not received any information in the mail. Acute stress. Admits to feeling reactive stress with cancer diagnosis. Was told that she will not be having children as she is going to have a hysterectomy. Reports feeling anxious about that. Having a hard time sleeping; panic at times. Was prescribed trazodone by Dr. Teryl Boxer but medication does not seem to be helping. Will wake with racing thoughts/panic. Continues with synthroid replacement therapy for hypothyroidism. Remains on metformin for prediabetes. Reports having fatigue; she is on once weekly vit D replacement. Interested in daily therapy. Review of Systems   Constitutional: Positive for fatigue. Negative for activity change, appetite change, chills, diaphoresis, fever and unexpected weight change. HENT: Negative for nosebleeds and trouble swallowing. Eyes: Negative for visual disturbance. Respiratory: Negative for cough, chest tightness, shortness of breath and wheezing. Cardiovascular: Negative for chest pain and palpitations. Gastrointestinal: Negative for abdominal distention, abdominal pain, diarrhea, nausea and vomiting. Endocrine: Negative for cold intolerance, heat intolerance, polydipsia, polyphagia and polyuria. Genitourinary: Positive for menstrual problem and pelvic pain.  Negative for difficulty urinating, dysuria, flank pain, frequency, vaginal bleeding, vaginal discharge and vaginal pain. Musculoskeletal: Negative for arthralgias. Neurological: Negative for dizziness, tremors, seizures, syncope, facial asymmetry, speech difficulty, weakness, numbness and headaches. Hematological: Does not bruise/bleed easily. Psychiatric/Behavioral: Negative for agitation, confusion, dysphoric mood and sleep disturbance. The patient is not nervous/anxious. Past Medical History:   Diagnosis Date    Adenocarcinoma of uterus ([facility] [address] 5/13/2022    Depression     Diabetes mellitus ([facility] [address]     meds since age 26s    Hypertension     meds since 10/2021    Thyroid disease 2017    meds since age 36s     Past Surgical History:   Procedure Laterality Date    DILATION AND CURETTAGE OF UTERUS N/A 4/4/2022    WITH  HYSTEROSCOPY  [facility]    ligament repair    KNEE SURGERY Left     I & D due to infection at age 36s     Social History     Socioeconomic History    Marital status: Single     Spouse name: Not on file    Number of children: Not on file    Years of education: Not on file    Highest education level: Not on file   Occupational History    Not on file   Tobacco Use    Smoking status: Never Smoker    Smokeless tobacco: Never Used   Vaping Use    Vaping Use: Never used   Substance and Sexual Activity    Alcohol use: No    Drug use: No    Sexual activity: Never   Other Topics Concern    Not on file   Social History Narrative    Not on file     Social Determinants of Health     Financial Resource Strain: Low Risk     Difficulty of Paying Living Expenses: Not hard at all   Food Insecurity: No Food Insecurity    Worried About [address] in the Last Year: Never true    [address] in the Last Year: Never true   Transportation Needs:     Lack of Transportation (Medical): Not on file    Lack of Transportation (Non-Medical):  Not on file   Physical Activity:     Days of Exercise per Week: Not on file    Minutes of Exercise per Session: Not on file   Stress:     Feeling of Stress : Not on file   Social Connections:     Frequency of Communication with Friends and Family: Not on file    Frequency of Social Gatherings with Friends and Family: Not on file    Attends Muslim Services: Not on file    Active Member of Clubs or Organizations: Not on file    Attends Club or Organization Meetings: Not on file    Marital Status: Not on file   Intimate Partner Violence:     Fear of Current or Ex-Partner: Not on file    Emotionally Abused: Not on file    Physically Abused: Not on file    Sexually Abused: Not on file   Housing Stability:     Unable to Pay for Housing in the Last Year: Not on file    Number of Places Lived in the Last Year: Not on file    Unstable Housing in the Last Year: Not on file     Family History   Problem Relation Age of Onset    Diabetes Mother     High Blood Pressure Mother     Kidney Disease Mother     Stroke Mother     Diabetes Father     High Blood Pressure Father     Diabetes Sister     High Blood Pressure Sister     Other Sister         Legally blind due to diabetes    COPD Brother     Sleep Apnea Brother     High Cholesterol Brother      No Known Allergies  Current Outpatient Medications   Medication Sig Dispense Refill    FreeStyle Lancets MISC USE AS DIRECTED TO TEST BLOOD SUGAR DAILY 100 each 10    vitamin B complex (NATURES BLEND B COMPLEX) TABS tablet Take 1 tablet by mouth daily 90 tablet 4    vitamin D (DIALYVITE VITAMIN D 5000) 125 MCG (5000 UT) CAPS capsule Take 1 capsule by mouth daily 90 capsule 4    busPIRone (BUSPAR) 7.5 MG tablet Take 1 tablet by mouth 2 times daily For anxiety. 60 tablet 5    ALPRAZolam (XANAX) 0.25 MG tablet Take 1 tablet by mouth 3 times daily as needed for Sleep or Anxiety for up to 30 days.  30 tablet 1    traZODone (DESYREL) 50 MG tablet TAKE 1 TABLET BY MOUTH EVERY NIGHT AT BEDTIME AS NEEDED FOR SLEEP 30 tablet 10    albuterol sulfate  (90 Base) MCG/ACT inhaler INHALE TWO (2) PUFFS BY MOUTH EVERY 4 HOURS AS NEEDED FOR WHEEZING OR FOR SHORTNESS OF BREATH 8.5 g 10    Alcohol Swabs (EASY TOUCH ALCOHOL PREP MEDIUM) 70 % PADS USE AS DIRECTED. 100 each 10    metFORMIN (GLUCOPHAGE) 500 MG tablet TAKE 1/2 TABLET BY MOUTH WITH BREAKFAST AND TAKE 1/2 TABLET BY MOUTH WITH DINNER 30 tablet 10    blood glucose test strips (FREESTYLE LITE) strip USE TO TEST BLOOD SUGAR TWICE DAILY AND AS NEEDED AS DIRECTED FOR SYMPTOMS OF IRREGULAR BLOOD SUGAR 100 strip 3    losartan (COZAAR) 25 MG tablet Take 1 tablet by mouth daily 30 tablet 10    glucose monitoring (FREESTYLE) kit 1 kit by Does not apply route daily 1 kit 0    Blood Pressure KIT 1 Device by Does not apply route 2 times daily 1 kit 1    Incontinence Supply Disposable (WINGS ADULT BRIEFS XXL) MISC Indications: Excessive Amount of Menstrual Volume Use three times daily as needed. 24 each 3    levothyroxine (SYNTHROID) 25 MCG tablet TAKE 1 TABLET BY MOUTH DAILY ON AN EMPTY STOMACH 30 MINUTES BEFORE OTHER MEDICATIONS AND FOOD 30 tablet 10    ibuprofen (ADVIL;MOTRIN) 800 MG tablet Take 1 tablet by mouth every 8 hours as needed for Pain 20 tablet 0    Incontinence Supply Disposable (ATTENDS BRIEFS LARGE) MISC Use monthly, 2-3 briefs daily for menorrhagia. 24 Bottle 5     No current facility-administered medications for this visit. PMH, Surgical Hx, Family Hx, and Social Hx reviewed and updated. Health Maintenance reviewed.     Objective  Vitals:    05/13/22 1141 05/13/22 1143   BP: (!) 140/100 (!) 138/100   Site: Right Upper Arm Right Upper Arm   Position: Sitting Sitting   Cuff Size: Large Adult Large Adult   Pulse: 91    Resp: 16    SpO2: 100%    Weight: 256 lb (116.1 kg)    Height: 5' 5.5\" (1.664 m)      BP Readings from Last 3 Encounters:   05/13/22 (!) 138/100   04/04/22 (!) 154/87   04/04/22 132/83     Wt Readings from Last 3 Encounters: 05/13/22 256 lb (116.1 kg)   04/04/22 256 lb (116.1 kg)   03/28/22 256 lb 6.4 oz (116.3 kg)     Physical Exam  Constitutional:       General: She is not in acute distress. Appearance: She is well-developed. She is obese. She is not ill-appearing or diaphoretic. HENT:      Head: Normocephalic and atraumatic. Right Ear: External ear normal.      Left Ear: External ear normal.      Nose: Nose normal.      Mouth/Throat:      Mouth: Mucous membranes are moist.   Eyes:      Conjunctiva/sclera: Conjunctivae normal.   Cardiovascular:      Rate and Rhythm: Normal rate and regular rhythm. Heart sounds: Normal heart sounds. No murmur heard. Pulmonary:      Effort: Pulmonary effort is normal. No respiratory distress. Breath sounds: Normal breath sounds. No wheezing or rales. Musculoskeletal:         General: Normal range of motion. Cervical back: Normal range of motion. Skin:     General: Skin is warm and dry. Findings: No erythema or rash. Neurological:      Mental Status: She is alert and oriented to person, place, and time. Psychiatric:         Attention and Perception: Attention normal.         Mood and Affect: Mood is anxious. Assessment & Plan   Sean Ruiz was seen today for follow-up. Diagnoses and all orders for this visit:    Adenocarcinoma of uterus ([address]    Chronic fatigue  -     vitamin B complex (NATURES BLEND B COMPLEX) TABS tablet; Take 1 tablet by mouth daily  -     vitamin D (DIALYVITE VITAMIN D 5000) 125 MCG (5000 UT) CAPS capsule; Take 1 capsule by mouth daily    Vitamin D deficiency  -     vitamin D (DIALYVITE VITAMIN D 5000) 125 MCG (5000 UT) CAPS capsule; Take 1 capsule by mouth daily    Acute stress disorder  -     busPIRone (BUSPAR) 7.5 MG tablet; Take 1 tablet by mouth 2 times daily For anxiety.  -     ALPRAZolam (XANAX) 0.25 MG tablet; Take 1 tablet by mouth 3 times daily as needed for Sleep or Anxiety for up to 30 days.     Pre-diabetes    Class 3 severe obesity due to excess calories without serious comorbidity with body mass index (BMI) of 40.0 to 44.9 in adult [facility])    Elevated blood pressure reading      6 week follow up. Orders Placed This Encounter   Medications    vitamin B complex (NATURES BLEND B COMPLEX) TABS tablet     Sig: Take 1 tablet by mouth daily     Dispense:  90 tablet     Refill:  4    vitamin D (DIALYVITE VITAMIN D 5000) 125 MCG (5000 UT) CAPS capsule     Sig: Take 1 capsule by mouth daily     Dispense:  90 capsule     Refill:  4    busPIRone (BUSPAR) 7.5 MG tablet     Sig: Take 1 tablet by mouth 2 times daily For anxiety. Dispense:  60 tablet     Refill:  5    ALPRAZolam (XANAX) 0.25 MG tablet     Sig: Take 1 tablet by mouth 3 times daily as needed for Sleep or Anxiety for up to 30 days. Dispense:  30 tablet     Refill:  1     Medications Discontinued During This Encounter   Medication Reason    cefTRIAXone (ROCEPHIN) 2 g IVPB in D5W 50ml minibag     vitamin D (ERGOCALCIFEROL) 1.25 MG (39755 UT) CAPS capsule Alternate therapy     No follow-ups on file. Reviewed with the patient: current clinical status, medications, activities and diet. Side effects, adverse effects of the medication prescribed today, as well as treatment plan/ rationale and result expectations have been discussed with the patient who expresses understanding and desires to proceed. Close follow up to evaluate treatment results and for coordination of care. I have reviewed the patient's medical history in detail and updated the computerized patient record.     Jocelyn William PA-C

## 2022-05-13 NOTE — TELEPHONE ENCOUNTER
pharmacy requesting medication refill.  Please approve or deny this request.    Rx requested:  Requested Prescriptions     Pending Prescriptions Disp Refills    FreeStyle Lancets [facility] [Pharmacy Med Name: FREESTYLE 28G LANCETS 100CT 28 GAUGE EACH] 100 each 10     Sig: USE AS DIRECTED TO TEST BLOOD SUGAR DAILY         Last Office Visit:   9/25/2020      Next Visit Date:  Future Appointments   Date Time Provider Erick Davies   5/13/2022 11:15 AM Andrea Wilson PA-C [address] [place] [address]

## 2022-05-31 ENCOUNTER — TELEPHONE (OUTPATIENT)
Dept: FAMILY MEDICINE CLINIC | Age: 42
End: 2022-05-31

## 2022-05-31 NOTE — TELEPHONE ENCOUNTER
Received a fax from [facility] pharmacy needing clarification on Vit D-3 125MCG Cap wanting to know if she should be taking this in addition to the 50,0000 weekly dose  Please advise

## 2022-06-03 ENCOUNTER — HOSPITAL ENCOUNTER (EMERGENCY)
Age: 42
Discharge: HOME | End: 2022-06-03
Attending: EMERGENCY MEDICINE
Payer: COMMERCIAL

## 2022-06-03 VITALS
TEMPERATURE: 97.5 F | RESPIRATION RATE: 16 BRPM | OXYGEN SATURATION: 95 % | SYSTOLIC BLOOD PRESSURE: 129 MMHG | HEIGHT: 66 IN | WEIGHT: 256 LBS | BODY MASS INDEX: 41.14 KG/M2 | HEART RATE: 71 BPM | DIASTOLIC BLOOD PRESSURE: 80 MMHG

## 2022-06-03 DIAGNOSIS — R11.0 NAUSEA: ICD-10-CM

## 2022-06-03 DIAGNOSIS — R73.03 PRE-DIABETES: ICD-10-CM

## 2022-06-03 DIAGNOSIS — N93.9 VAGINAL BLEEDING: Primary | ICD-10-CM

## 2022-06-03 LAB
ABO/RH: NORMAL
ALBUMIN SERPL-MCNC: 4.2 G/DL (ref 3.5–4.6)
ALP BLD-CCNC: 98 U/L (ref 40–130)
ALT SERPL-CCNC: 46 U/L (ref 0–33)
ANION GAP SERPL CALCULATED.3IONS-SCNC: 11 MEQ/L (ref 9–15)
ANTIBODY SCREEN: NORMAL
APTT: 25.7 SEC (ref 24.4–36.8)
AST SERPL-CCNC: 34 U/L (ref 0–35)
BASOPHILS ABSOLUTE: 0.1 K/UL (ref 0–0.2)
BASOPHILS RELATIVE PERCENT: 0.9 %
BILIRUB SERPL-MCNC: 0.4 MG/DL (ref 0.2–0.7)
BUN BLDV-MCNC: 13 MG/DL (ref 6–20)
CALCIUM SERPL-MCNC: 9.8 MG/DL (ref 8.5–9.9)
CHLORIDE BLD-SCNC: 103 MEQ/L (ref 95–107)
CO2: 26 MEQ/L (ref 20–31)
CREAT SERPL-MCNC: 0.86 MG/DL (ref 0.5–0.9)
EOSINOPHILS ABSOLUTE: 0.1 K/UL (ref 0–0.7)
EOSINOPHILS RELATIVE PERCENT: 1.3 %
GFR AFRICAN AMERICAN: >60
GFR NON-AFRICAN AMERICAN: >60
GLOBULIN: 2.7 G/DL (ref 2.3–3.5)
GLUCOSE BLD-MCNC: 155 MG/DL (ref 70–99)
HCT VFR BLD CALC: 42.2 % (ref 37–47)
HEMOGLOBIN: 14.1 G/DL (ref 12–16)
INR BLD: 1
LYMPHOCYTES ABSOLUTE: 3 K/UL (ref 1–4.8)
LYMPHOCYTES RELATIVE PERCENT: 26 %
MCH RBC QN AUTO: 30.1 PG (ref 27–31.3)
MCHC RBC AUTO-ENTMCNC: 33.4 % (ref 33–37)
MCV RBC AUTO: 90 FL (ref 82–100)
MONOCYTES ABSOLUTE: 0.7 K/UL (ref 0.2–0.8)
MONOCYTES RELATIVE PERCENT: 6.5 %
NEUTROPHILS ABSOLUTE: 7.4 K/UL (ref 1.4–6.5)
NEUTROPHILS RELATIVE PERCENT: 65.3 %
PDW BLD-RTO: 13.7 % (ref 11.5–14.5)
PLATELET # BLD: 266 K/UL (ref 130–400)
POTASSIUM SERPL-SCNC: 3.9 MEQ/L (ref 3.4–4.9)
PROTHROMBIN TIME: 12.8 SEC (ref 12.3–14.9)
RBC # BLD: 4.69 M/UL (ref 4.2–5.4)
SODIUM BLD-SCNC: 140 MEQ/L (ref 135–144)
TOTAL PROTEIN: 6.9 G/DL (ref 6.3–8)
WBC # BLD: 11.3 K/UL (ref 4.8–10.8)

## 2022-06-03 PROCEDURE — 85730 THROMBOPLASTIN TIME PARTIAL: CPT

## 2022-06-03 PROCEDURE — 99284 EMERGENCY DEPT VISIT MOD MDM: CPT

## 2022-06-03 PROCEDURE — 36415 COLL VENOUS BLD VENIPUNCTURE: CPT

## 2022-06-03 PROCEDURE — 96374 THER/PROPH/DIAG INJ IV PUSH: CPT

## 2022-06-03 PROCEDURE — 86850 RBC ANTIBODY SCREEN: CPT

## 2022-06-03 PROCEDURE — 6360000002 HC RX W HCPCS: Performed by: EMERGENCY MEDICINE

## 2022-06-03 PROCEDURE — 80053 COMPREHEN METABOLIC PANEL: CPT

## 2022-06-03 PROCEDURE — 85610 PROTHROMBIN TIME: CPT

## 2022-06-03 PROCEDURE — 86900 BLOOD TYPING SEROLOGIC ABO: CPT

## 2022-06-03 PROCEDURE — 85025 COMPLETE CBC W/AUTO DIFF WBC: CPT

## 2022-06-03 PROCEDURE — 86901 BLOOD TYPING SEROLOGIC RH(D): CPT

## 2022-06-03 RX ORDER — ONDANSETRON 4 MG/1
4 TABLET, ORALLY DISINTEGRATING ORAL 3 TIMES DAILY PRN
Qty: 21 TABLET | Refills: 0 | Status: SHIPPED | OUTPATIENT
Start: 2022-06-03

## 2022-06-03 RX ORDER — ONDANSETRON 2 MG/ML
4 INJECTION INTRAMUSCULAR; INTRAVENOUS ONCE
Status: COMPLETED | OUTPATIENT
Start: 2022-06-03 | End: 2022-06-03

## 2022-06-03 RX ORDER — LANCETS 28 GAUGE
EACH MISCELLANEOUS
Qty: 100 EACH | Refills: 10 | Status: SHIPPED | OUTPATIENT
Start: 2022-06-03

## 2022-06-03 RX ORDER — 0.9 % SODIUM CHLORIDE 0.9 %
1000 INTRAVENOUS SOLUTION INTRAVENOUS ONCE
Status: DISCONTINUED | OUTPATIENT
Start: 2022-06-03 | End: 2022-06-03

## 2022-06-03 RX ADMIN — ONDANSETRON 4 MG: 2 INJECTION INTRAMUSCULAR; INTRAVENOUS at 12:04

## 2022-06-03 ASSESSMENT — ENCOUNTER SYMPTOMS
BACK PAIN: 0
COUGH: 0
SORE THROAT: 0
VOMITING: 0
SHORTNESS OF BREATH: 0
ABDOMINAL PAIN: 0
DIARRHEA: 0
NAUSEA: 0

## 2022-06-03 ASSESSMENT — PAIN - FUNCTIONAL ASSESSMENT
PAIN_FUNCTIONAL_ASSESSMENT: NONE - DENIES PAIN
PAIN_FUNCTIONAL_ASSESSMENT: NONE - DENIES PAIN

## 2022-06-03 NOTE — TELEPHONE ENCOUNTER
pharmacy requesting medication refill.  Please approve or deny this request.    Rx requested:  Requested Prescriptions     Pending Prescriptions Disp Refills    FreeStyle Lancets [address] [facility] [Pharmacy Med Name: FREESTYLE 28G LANCETS 100CT 28 GAUGE EACH] 100 each 10     Sig: USE AS DIRECTED TO TEST BLOOD SUGAR DAILY         Last Office Visit:   5/13/2022      Next Visit Date:  Future Appointments   Date Time Provider Erick Davies   6/13/2022 11:15 AM Tiffany Sadler PA-C [address] [place] [address]

## 2022-06-03 NOTE — ED PROVIDER NOTES
[facility] ED  eMERGENCYdEPARTMENT eNCOUnter      Pt Name: Lui Rolle  MRN: [id number]  [place] 1980  Date of evaluation: 6/3/2022  Rigoberto Posey MD    CHIEF COMPLAINT           HPI  Lui Rolle is a 37 y.o. female per chart review has a h/o ALDO, hypothyroidism presents to the ED with vaginal bleeding. Pt notes gradual onset, moderate, constant, vaginal bleeding since 5/23. Pt was recently diagnosed with adenocarcinoma of the uterus and is scheduled for a hysterectomy on 6/13. Pt denies fever, dizziness, cp, sob, ab pain, dysuria, weakness, diarrhea. Pt states she goes through 2 pads a day. ROS  Review of Systems   Constitutional: Negative for activity change, chills and fever. HENT: Negative for ear pain and sore throat. Eyes: Negative for visual disturbance. Respiratory: Negative for cough and shortness of breath. Cardiovascular: Negative for chest pain, palpitations and leg swelling. Gastrointestinal: Negative for abdominal pain, diarrhea, nausea and vomiting. Genitourinary: Positive for vaginal bleeding. Negative for dysuria. Musculoskeletal: Negative for back pain. Skin: Negative for rash. Neurological: Negative for dizziness and weakness. Except as noted above the remainder of the review of systems was reviewed and negative.        PAST MEDICAL HISTORY     Past Medical History:   Diagnosis Date    Adenocarcinoma of uterus ([address] 5/13/2022    Depression     Diabetes mellitus ([address]     meds since age 26s    Hypertension     meds since 10/2021    Thyroid disease 2017    meds since age 36s         SURGICAL HISTORY       Past Surgical History:   Procedure Laterality Date    DILATION AND CURETTAGE OF UTERUS N/A 4/4/2022    WITH  HYSTEROSCOPY  DILATATION AND CURETTAGE [facility] by Jules [facility] DO [facility] 2016    ligament repair    KNEE SURGERY Left     I & D due to infection at age 36s         CURRENTMEDICATIONS Previous Medications    ALBUTEROL SULFATE  (90 BASE) MCG/ACT INHALER    INHALE TWO (2) PUFFS BY MOUTH EVERY 4 HOURS AS NEEDED FOR WHEEZING OR FOR SHORTNESS OF BREATH    ALCOHOL SWABS (EASY TOUCH ALCOHOL PREP MEDIUM) 70 % PADS    USE AS DIRECTED. ALPRAZOLAM (XANAX) 0.25 MG TABLET    Take 1 tablet by mouth 3 times daily as needed for Sleep or Anxiety for up to 30 days. BLOOD GLUCOSE TEST STRIPS (FREESTYLE LITE) STRIP    USE TO TEST BLOOD SUGAR TWICE DAILY AND AS NEEDED AS DIRECTED FOR SYMPTOMS OF IRREGULAR BLOOD SUGAR    BLOOD PRESSURE KIT    1 Device by Does not apply route 2 times daily    BUSPIRONE (BUSPAR) 7.5 MG TABLET    Take 1 tablet by mouth 2 times daily For anxiety. GLUCOSE MONITORING (FREESTYLE) KIT    1 kit by Does not apply route daily    IBUPROFEN (ADVIL;MOTRIN) 800 MG TABLET    Take 1 tablet by mouth every 8 hours as needed for Pain    INCONTINENCE SUPPLY DISPOSABLE (ATTENDS BRIEFS LARGE) MISC    Use monthly, 2-3 briefs daily for menorrhagia. INCONTINENCE SUPPLY DISPOSABLE (WINGS ADULT BRIEFS XXL) MISC    Indications: Excessive Amount of Menstrual Volume Use three times daily as needed. LEVOTHYROXINE (SYNTHROID) 25 MCG TABLET    TAKE 1 TABLET BY MOUTH DAILY ON AN EMPTY STOMACH 30 MINUTES BEFORE OTHER MEDICATIONS AND FOOD    LOSARTAN (COZAAR) 25 MG TABLET    Take 1 tablet by mouth daily    METFORMIN (GLUCOPHAGE) 500 MG TABLET    TAKE 1/2 TABLET BY MOUTH WITH BREAKFAST AND TAKE 1/2 TABLET BY MOUTH WITH DINNER    TRAZODONE (DESYREL) 50 MG TABLET    TAKE 1 TABLET BY MOUTH EVERY NIGHT AT BEDTIME AS NEEDED FOR SLEEP    VITAMIN B COMPLEX (NATURES BLEND B COMPLEX) TABS TABLET    Take 1 tablet by mouth daily    VITAMIN D (DIALYVITE VITAMIN D 5000) 125 MCG (5000 UT) CAPS CAPSULE    Take 1 capsule by mouth daily       ALLERGIES     Patient has no known allergies.     FAMILY HISTORY       Family History   Problem Relation Age of Onset    Diabetes Mother     High Blood Pressure Mother  Kidney Disease Mother     Stroke Mother     Diabetes Father     High Blood Pressure Father     Diabetes Sister     High Blood Pressure Sister    [place] Other Sister         Legally blind due to diabetes    COPD Brother     Sleep Apnea Brother     High Cholesterol Brother           SOCIAL HISTORY       Social History     Socioeconomic History    Marital status: Single     Spouse name: None    Number of children: None    Years of education: None    Highest education level: None   Occupational History    None   Tobacco Use    Smoking status: Never Smoker    Smokeless tobacco: Never Used   Vaping Use    Vaping Use: Never used   Substance and Sexual Activity    Alcohol use: No    Drug use: No    Sexual activity: Never   Other Topics Concern    None   Social History Narrative    None     Social Determinants of Health     Financial Resource Strain: Low Risk     Difficulty of Paying Living Expenses: Not hard at all   Food Insecurity: No Food Insecurity    Worried About Running Out of Food in the Last Year: Never true    Lev of Food in the Last Year: Never true   Transportation Needs:     Lack of Transportation (Medical): Not on file    Lack of Transportation (Non-Medical):  Not on file   Physical Activity:     Days of Exercise per Week: Not on file    Minutes of Exercise per Session: Not on file   Stress:     Feeling of Stress : Not on file   Social Connections:     Frequency of Communication with Friends and Family: Not on file    Frequency of Social Gatherings with Friends and Family: Not on file    Attends Taoist Services: Not on file    Active Member of Clubs or Organizations: Not on file    Attends Club or Organization Meetings: Not on file    Marital Status: Not on file   Intimate Partner Violence:     Fear of Current or Ex-Partner: Not on file    Emotionally Abused: Not on file    Physically Abused: Not on file    Sexually Abused: Not on file   Housing Stability:     Unable to Pay for Housing in the Last Year: Not on file    Number of Places Lived in the Last Year: Not on file    Unstable Housing in the Last Year: Not on file         PHYSICAL EXAM       ED Triage Vitals [06/03/22 1038]   BP Temp Temp Source Heart Rate Resp SpO2 Height Weight   (!) 161/94 97.5 °F (36.4 °C) Temporal 94 18 97 % 5' 6\" (1.676 m) 256 lb (116.1 kg)       Physical Exam  Vitals and nursing note reviewed. Constitutional:       Appearance: She is well-developed. HENT:      Head: Normocephalic. Right Ear: External ear normal.      Left Ear: External ear normal.   Eyes:      Conjunctiva/sclera: Conjunctivae normal.      Pupils: Pupils are equal, round, and reactive to light. Cardiovascular:      Rate and Rhythm: Normal rate and regular rhythm. Heart sounds: Normal heart sounds. Pulmonary:      Effort: Pulmonary effort is normal.      Breath sounds: Normal breath sounds. Abdominal:      General: Bowel sounds are normal. There is no distension. Palpations: Abdomen is soft. Tenderness: There is no abdominal tenderness. Musculoskeletal:         General: Normal range of motion. Cervical back: Normal range of motion and neck supple. Skin:     General: Skin is warm and dry. Neurological:      Mental Status: She is alert and oriented to person, place, and time. Psychiatric:         Mood and Affect: Mood normal.           MDM  38 yo female presents to the ED with vaginal bleeding. Pt is afebrile, hemodynamically stable. Pt given 1 L NS in the ED. Labs unremarkable. Pt's Hb 14. 1. Pt reassessed and doing well. Pt notes mild nausea so pt given IV zofran in the ED. Pt educated about vaginal bleeding and uterine Ca. Pt has f/u with gyn/onc in 10 days. Pt given anemia warning signs and will f/uw with pcp. Pt understands plan. FINAL IMPRESSION      1. Vaginal bleeding    2.  Nausea          DISPOSITION/PLAN   DISPOSITION Decision To Discharge 06/03/2022 11:54:55 AM        DISCHARGE MEDICATIONS:  [unfilled]         Keo Kauffman MD(electronically signed)  Attending Emergency Physician            Keo Kauffman MD  06/03/22 3801

## 2022-06-30 NOTE — TELEPHONE ENCOUNTER
Kyle Galeas is asking if patient is to be on weekly Vit D or Daily, or both? Please advise.     [facility] [phone]

## 2022-08-23 NOTE — TELEPHONE ENCOUNTER
Rx request   Requested Prescriptions     Pending Prescriptions Disp Refills    Blood Glucose Monitoring Suppl (FREESTYLE LITE) w/Device KIT [Pharmacy Med Name: FREESTYLE LITE METER Kit] 1 kit 10     Sig: USE AS DIRECTED     LOV 5/13/2022  Last refill 2/18/22  Next Visit Date:  No future appointments.

## 2022-08-24 RX ORDER — BLOOD-GLUCOSE METER
KIT MISCELLANEOUS
Qty: 1 KIT | Refills: 10 | Status: SHIPPED | OUTPATIENT
Start: 2022-08-24

## 2022-09-19 DIAGNOSIS — E03.9 HYPOTHYROIDISM, UNSPECIFIED TYPE: ICD-10-CM

## 2022-09-21 NOTE — TELEPHONE ENCOUNTER
Rx request   Requested Prescriptions     Pending Prescriptions Disp Refills    levothyroxine (SYNTHROID) 25 MCG tablet [Pharmacy Med Name: LEVOTHYROXINE 25 MCG TAB 25 Tablet] 30 tablet 10     Sig: TAKE 1 TABLET BY MOUTH DAILY ON AN EMPTY STOMACH 30 MINUTES BEFORE OTHER MEDICATIONS AND FOOD     LOV 5/13/2022  Last refill 10/8/21  Next Visit Date:  No future appointments.

## 2022-09-22 RX ORDER — LEVOTHYROXINE SODIUM 0.03 MG/1
TABLET ORAL
Qty: 30 TABLET | Refills: 10 | Status: SHIPPED | OUTPATIENT
Start: 2022-09-22

## 2022-09-25 ENCOUNTER — HOSPITAL ENCOUNTER (EMERGENCY)
Age: 42
Discharge: HOME | End: 2022-09-25
Payer: COMMERCIAL

## 2022-09-25 ENCOUNTER — APPOINTMENT (OUTPATIENT)
Dept: GENERAL RADIOLOGY | Age: 42
End: 2022-09-25
Payer: COMMERCIAL

## 2022-09-25 VITALS
DIASTOLIC BLOOD PRESSURE: 99 MMHG | WEIGHT: 265 LBS | RESPIRATION RATE: 18 BRPM | BODY MASS INDEX: 40.16 KG/M2 | OXYGEN SATURATION: 99 % | TEMPERATURE: 97.3 F | HEART RATE: 84 BPM | HEIGHT: 68 IN | SYSTOLIC BLOOD PRESSURE: 159 MMHG

## 2022-09-25 DIAGNOSIS — M79.671 RIGHT FOOT PAIN: Primary | ICD-10-CM

## 2022-09-25 DIAGNOSIS — M79.5 FOREIGN BODY (FB) IN SOFT TISSUE: ICD-10-CM

## 2022-09-25 PROCEDURE — 73630 X-RAY EXAM OF FOOT: CPT

## 2022-09-25 PROCEDURE — 99283 EMERGENCY DEPT VISIT LOW MDM: CPT

## 2022-09-25 ASSESSMENT — PAIN - FUNCTIONAL ASSESSMENT: PAIN_FUNCTIONAL_ASSESSMENT: NONE - DENIES PAIN

## 2022-09-25 NOTE — ED PROVIDER NOTES
[address] [facility] ED  eMERGENCY dEPARTMENTeNCOUnter      Pt Name: Rema Griffin  MRN: [id number]  Felicitagfkody 1980  Date ofevaluation: 9/25/2022  Provider: Miriam Tam PA-C    CHIEF COMPLAINT       Chief Complaint   Patient presents with    Foot Pain     Glass in right foot           HISTORY OF PRESENT ILLNESS   (Location/Symptom, Timing/Onset,Context/Setting, Quality, Duration, Modifying Factors, Severity)  Note limiting factors. This is a 55-year-old female presenting to the emergency department for evaluation of possible foreign body in her right foot. The patient states that about 4-5 days ago she broke some glass and stepped on it and tried to clean it and she thought she got all the glass out, however she is felt the sensation of a foreign body in the distal aspect of the right plantar foot. She had tried to remove without any noticeable success. She reports mild to moderate pain on the foot. Mainly aggravated with weightbearing but less so with palpation. No reported alleviating factors. No further concerns. NursingNotes were reviewed. REVIEW OF SYSTEMS    (2-9 systems for level 4, 10 or more for level 5)     Review of Systems   Constitutional:  Negative for chills and fever. Skin:  Positive for wound. Neurological:  Negative for numbness. Hematological:  Does not bruise/bleed easily. Except as noted above the remainder of the review of systems was reviewed and negative.        PAST MEDICAL HISTORY     Past Medical History:   Diagnosis Date    Adenocarcinoma of uterus ([facility] [address] 5/13/2022    Depression     Diabetes mellitus ([facility] [address]     meds since age 26s    Hypertension     meds since 10/2021    Thyroid disease 2017    meds since age 36s         SURGICALHISTORY       Past Surgical History:   Procedure Laterality Date    DILATION AND CURETTAGE OF UTERUS N/A 4/4/2022    WITH  HYSTEROSCOPY  DILATATION AND CURETTAGE performed by Darell Delgadillo DO at [place] [id number] SURGERY Right 2016    ligament repair    KNEE SURGERY Left     I & D due to infection at age 36s         CURRENT MEDICATIONS       Discharge Medication List as of 9/25/2022 12:08 PM        CONTINUE these medications which have NOT CHANGED    Details   levothyroxine (SYNTHROID) 25 MCG tablet TAKE 1 TABLET BY MOUTH DAILY ON AN EMPTY STOMACH 30 MINUTES BEFORE OTHER MEDICATIONS AND FOOD, Disp-30 tablet, R-10Normal      Blood Glucose Monitoring Suppl (FREESTYLE LITE) w/Device KIT USE AS DIRECTED, Disp-1 kit, R-10Normal      FreeStyle Lancets MISC Disp-100 each, R-10, NormalUSE AS DIRECTED TO TEST BLOOD SUGAR DAILY      ondansetron (ZOFRAN-ODT) 4 MG disintegrating tablet Take 1 tablet by mouth 3 times daily as needed for Nausea or Vomiting, Disp-21 tablet, R-0Print      vitamin B complex (NATURES BLEND B COMPLEX) TABS tablet Take 1 tablet by mouth daily, Disp-90 tablet, R-4Normal      vitamin D (DIALYVITE VITAMIN D 5000) 125 MCG (5000 UT) CAPS capsule Take 1 capsule by mouth daily, Disp-90 capsule, R-4Normal      traZODone (DESYREL) 50 MG tablet TAKE 1 TABLET BY MOUTH EVERY NIGHT AT BEDTIME AS NEEDED FOR SLEEP, Disp-30 tablet, R-10Normal      albuterol sulfate  (90 Base) MCG/ACT inhaler INHALE TWO (2) PUFFS BY MOUTH EVERY 4 HOURS AS NEEDED FOR WHEEZING OR FOR SHORTNESS OF BREATH, Disp-8.5 g, R-10Normal      Alcohol Swabs (EASY TOUCH ALCOHOL PREP MEDIUM) 70 % PADS Disp-100 each, R-10, NormalUSE AS DIRECTED.      metFORMIN (GLUCOPHAGE) 500 MG tablet TAKE 1/2 TABLET BY MOUTH WITH BREAKFAST AND TAKE 1/2 TABLET BY MOUTH WITH DINNER, Disp-30 tablet, R-10Normal      blood glucose test strips (FREESTYLE LITE) strip USE TO TEST BLOOD SUGAR TWICE DAILY AND AS NEEDED AS DIRECTED FOR SYMPTOMS OF IRREGULAR BLOOD SUGAR, Disp-100 strip, R-3Normal      losartan (COZAAR) 25 MG tablet Take 1 tablet by mouth daily, Disp-30 tablet, R-10Normal      glucose monitoring (FREESTYLE) kit DAILY Starting Fri 2/18/2022, Disp-1 kit, R-0, Normal Blood Pressure KIT 2 TIMES DAILY Starting Fri 2/11/2022, Disp-1 kit, R-1, Normal      !! Incontinence Supply Disposable (WINGS ADULT BRIEFS XXL) MISC Disp-24 each, R-3, NormalUse three times daily as needed. ibuprofen (ADVIL;MOTRIN) 800 MG tablet Take 1 tablet by mouth every 8 hours as needed for Pain, Disp-20 tablet, R-0Print      !! Incontinence Supply Disposable (ATTENDS BRIEFS LARGE) MISC Disp-24 Bottle, R-5, NormalUse monthly, 2-3 briefs daily for menorrhagia. !! - Potential duplicate medications found. Please discuss with provider. Patient has no known allergies.     FAMILY HISTORY       Family History   Problem Relation Age of Onset    Diabetes Mother     High Blood Pressure Mother     Kidney Disease Mother     Stroke Mother     Diabetes Father     High Blood Pressure Father     Diabetes Sister     High Blood Pressure Sister     Other Sister         Legally blind due to diabetes    COPD Brother     Sleep Apnea Brother     High Cholesterol Brother           SOCIAL HISTORY       Social History     Socioeconomic History    Marital status: Single     Spouse name: None    Number of children: None    Years of education: None    Highest education level: None   Tobacco Use    Smoking status: Never    Smokeless tobacco: Never   Vaping Use    Vaping Use: Never used   Substance and Sexual Activity    Alcohol use: No    Drug use: No    Sexual activity: Never     Social Determinants of Health     Financial Resource Strain: Low Risk     Difficulty of Paying Living Expenses: Not hard at all   Food Insecurity: No Food Insecurity    Worried About Running Out of Food in the Last Year: Never true    Ran Out of Food in the Last Year: Never true       SCREENINGS    [place] Coma Scale  Eye Opening: Spontaneous  Best Verbal Response: Oriented  Best Motor Response: Obeys commands  [place] Coma Scale Score: 15        PHYSICAL EXAM    (up to 7 for level 4, 8 or more for level 5)     ED Triage Vitals   BP Temp Temp Source Heart Rate Resp SpO2 Height Weight   09/25/22 1045 09/25/22 1044 09/25/22 1044 09/25/22 1044 09/25/22 1044 09/25/22 1044 09/25/22 1044 09/25/22 1044   (!) 159/99 97.3 °F (36.3 °C) Temporal 84 18 99 % 5' 8\" (1.727 m) 265 lb (120.2 kg)       Physical Exam  Vitals and nursing note reviewed. Constitutional:       General: She is not in acute distress. Appearance: Normal appearance. She is normal weight. She is not ill-appearing, toxic-appearing or diaphoretic. HENT:      Head: Normocephalic and atraumatic. Nose: Nose normal. No congestion or rhinorrhea. Mouth/Throat:      Mouth: Mucous membranes are moist.      Pharynx: Oropharynx is clear. Eyes:      Extraocular Movements: Extraocular movements intact. Conjunctiva/sclera: Conjunctivae normal.   Pulmonary:      Effort: Pulmonary effort is normal. No respiratory distress. Musculoskeletal:         General: Tenderness present. Normal range of motion. Cervical back: Normal range of motion and neck supple. Feet:    Skin:     General: Skin is warm and dry. Capillary Refill: Capillary refill takes less than 2 seconds. Neurological:      Mental Status: She is alert and oriented to person, place, and time. Psychiatric:         Mood and Affect: Mood normal.         Behavior: Behavior normal.       RESULTS     RADIOLOGY:   Non-plain filmimages such as CT, Ultrasound and MRI are read by the radiologist.       Interpretation per the Radiologist below, if available at the time ofthis note:    XR FOOT RIGHT (MIN 3 VIEWS)   Final Result   Negative study of the right foot. LABS:  Labs Reviewed - No data to display    All other labs were within normal range or not returned as of this dictation.     EMERGENCY DEPARTMENT COURSE and DIFFERENTIAL DIAGNOSIS/MDM:   Vitals:    Vitals:    09/25/22 1044 09/25/22 1045   BP:  (!) 159/99   Pulse: 84    Resp: 18    Temp: 97.3 °F (36.3 °C)    TempSrc: Temporal    SpO2: 99%

## 2022-09-29 ENCOUNTER — OFFICE VISIT (OUTPATIENT)
Dept: FAMILY MEDICINE CLINIC | Age: 42
End: 2022-09-29
Payer: COMMERCIAL

## 2022-09-29 VITALS
BODY MASS INDEX: 43.32 KG/M2 | HEART RATE: 77 BPM | WEIGHT: 260 LBS | OXYGEN SATURATION: 99 % | HEIGHT: 65 IN | SYSTOLIC BLOOD PRESSURE: 140 MMHG | TEMPERATURE: 97 F | DIASTOLIC BLOOD PRESSURE: 102 MMHG

## 2022-09-29 DIAGNOSIS — N30.01 ACUTE CYSTITIS WITH HEMATURIA: Primary | ICD-10-CM

## 2022-09-29 DIAGNOSIS — R30.0 DYSURIA: ICD-10-CM

## 2022-09-29 LAB
BILIRUBIN, POC: ABNORMAL
BLOOD URINE, POC: 80
CLARITY, POC: ABNORMAL
COLOR, POC: ABNORMAL
GLUCOSE URINE, POC: ABNORMAL
KETONES, POC: ABNORMAL
LEUKOCYTE EST, POC: ABNORMAL
NITRITE, POC: ABNORMAL
PH, POC: 5.5
PROTEIN, POC: ABNORMAL
SPECIFIC GRAVITY, POC: 1.03
UROBILINOGEN, POC: 3.5

## 2022-09-29 PROCEDURE — 99213 OFFICE O/P EST LOW 20 MIN: CPT | Performed by: PHYSICIAN ASSISTANT

## 2022-09-29 PROCEDURE — 81003 URINALYSIS AUTO W/O SCOPE: CPT | Performed by: PHYSICIAN ASSISTANT

## 2022-09-29 RX ORDER — PHENAZOPYRIDINE HYDROCHLORIDE 200 MG/1
200 TABLET, FILM COATED ORAL 3 TIMES DAILY PRN
Qty: 8 TABLET | Refills: 0 | Status: SHIPPED | OUTPATIENT
Start: 2022-09-29 | End: 2022-10-02

## 2022-09-29 RX ORDER — GREEN TEA/HOODIA GORDONII 315-12.5MG
1 CAPSULE ORAL DAILY
Qty: 30 TABLET | Refills: 1 | Status: SHIPPED | OUTPATIENT
Start: 2022-09-29 | End: 2022-10-29

## 2022-09-29 RX ORDER — NITROFURANTOIN 25; 75 MG/1; MG/1
100 CAPSULE ORAL 2 TIMES DAILY
Qty: 20 CAPSULE | Refills: 0 | Status: SHIPPED | OUTPATIENT
Start: 2022-09-29 | End: 2022-10-09

## 2022-09-29 ASSESSMENT — PATIENT HEALTH QUESTIONNAIRE - PHQ9
SUM OF ALL RESPONSES TO PHQ QUESTIONS 1-9: 0
SUM OF ALL RESPONSES TO PHQ9 QUESTIONS 1 & 2: 0
SUM OF ALL RESPONSES TO PHQ QUESTIONS 1-9: 0
SUM OF ALL RESPONSES TO PHQ QUESTIONS 1-9: 0
1. LITTLE INTEREST OR PLEASURE IN DOING THINGS: 0
SUM OF ALL RESPONSES TO PHQ QUESTIONS 1-9: 0
2. FEELING DOWN, DEPRESSED OR HOPELESS: 0

## 2022-09-29 ASSESSMENT — ENCOUNTER SYMPTOMS
GASTROINTESTINAL NEGATIVE: 1
BACK PAIN: 1
RESPIRATORY NEGATIVE: 1
EYES NEGATIVE: 1

## 2022-09-29 NOTE — PATIENT INSTRUCTIONS
Increase water intake and continue on the sugar-free cranberry juice to help flush out the infection  You can also add lemon juice to water to help flush out the infection as well

## 2022-09-29 NOTE — PROGRESS NOTES
MEDICATIONS AND FOOD 30 tablet 10    Blood Glucose Monitoring Suppl (FREESTYLE LITE) w/Device KIT USE AS DIRECTED 1 kit 10    FreeStyle Lancets MISC USE AS DIRECTED TO TEST BLOOD SUGAR DAILY 100 each 10    ondansetron (ZOFRAN-ODT) 4 MG disintegrating tablet Take 1 tablet by mouth 3 times daily as needed for Nausea or Vomiting 21 tablet 0    vitamin B complex (NATURES BLEND B COMPLEX) TABS tablet Take 1 tablet by mouth daily 90 tablet 4    vitamin D (DIALYVITE VITAMIN D 5000) 125 MCG (5000 UT) CAPS capsule Take 1 capsule by mouth daily 90 capsule 4    traZODone (DESYREL) 50 MG tablet TAKE 1 TABLET BY MOUTH EVERY NIGHT AT BEDTIME AS NEEDED FOR SLEEP 30 tablet 10    albuterol sulfate  (90 Base) MCG/ACT inhaler INHALE TWO (2) PUFFS BY MOUTH EVERY 4 HOURS AS NEEDED FOR WHEEZING OR FOR SHORTNESS OF BREATH 8.5 g 10    Alcohol Swabs (EASY TOUCH ALCOHOL PREP MEDIUM) 70 % PADS USE AS DIRECTED. 100 each 10    metFORMIN (GLUCOPHAGE) 500 MG tablet TAKE 1/2 TABLET BY MOUTH WITH BREAKFAST AND TAKE 1/2 TABLET BY MOUTH WITH DINNER 30 tablet 10    blood glucose test strips (FREESTYLE LITE) strip USE TO TEST BLOOD SUGAR TWICE DAILY AND AS NEEDED AS DIRECTED FOR SYMPTOMS OF IRREGULAR BLOOD SUGAR 100 strip 3    losartan (COZAAR) 25 MG tablet Take 1 tablet by mouth daily 30 tablet 10    glucose monitoring (FREESTYLE) kit 1 kit by Does not apply route daily 1 kit 0    Blood Pressure KIT 1 Device by Does not apply route 2 times daily 1 kit 1    Incontinence Supply Disposable (WINGS ADULT BRIEFS XXL) MISC Indications: Excessive Amount of Menstrual Volume Use three times daily as needed. 24 each 3    ibuprofen (ADVIL;MOTRIN) 800 MG tablet Take 1 tablet by mouth every 8 hours as needed for Pain 20 tablet 0    Incontinence Supply Disposable (ATTENDS BRIEFS LARGE) MISC Use monthly, 2-3 briefs daily for menorrhagia.  24 Bottle 5    nitrofurantoin, macrocrystal-monohydrate, (MACROBID) 100 MG capsule Take 1 capsule by mouth 2 times daily for 10 days 20 capsule 0    phenazopyridine (PYRIDIUM) 200 MG tablet Take 1 tablet by mouth 3 times daily as needed for Pain 8 tablet 0    Probiotic Acidophilus (FLORANEX) TABS Take 1 tablet by mouth daily 30 tablet 1     No current facility-administered medications for this visit. ALLERGIES     Patient has no known allergies. FAMILY HISTORY       Family History   Problem Relation Age of Onset    Diabetes Mother     High Blood Pressure Mother     Kidney Disease Mother     Stroke Mother     Diabetes Father     High Blood Pressure Father     Diabetes Sister     High Blood Pressure Sister     Other Sister         Legally blind due to diabetes    COPD Brother     Sleep Apnea Brother     High Cholesterol Brother           SOCIAL HISTORY       Social History     Socioeconomic History    Marital status: Single     Spouse name: None    Number of children: None    Years of education: None    Highest education level: None   Tobacco Use    Smoking status: Never    Smokeless tobacco: Never   Vaping Use    Vaping Use: Never used   Substance and Sexual Activity    Alcohol use: No    Drug use: No    Sexual activity: Never     Social Determinants of Health     Financial Resource Strain: Low Risk     Difficulty of Paying Living Expenses: Not hard at all   Food Insecurity: No Food Insecurity    Worried About Running Out of Food in the Last Year: Never true    Ran Out of Food in the Last Year: Never true         PHYSICAL EXAM    (up to 7 for level 4, 8 or more for level 5)       Physical Exam  Constitutional:       General: She is not in acute distress. Appearance: She is well-developed. HENT:      Head: Normocephalic and atraumatic. Eyes:      Conjunctiva/sclera: Conjunctivae normal.      Pupils: Pupils are equal, round, and reactive to light. Cardiovascular:      Rate and Rhythm: Normal rate and regular rhythm. Heart sounds: No murmur heard. Pulmonary:      Effort: No respiratory distress.       Breath sounds: Normal breath sounds. No wheezing or rales. Abdominal:      General: There is no distension. Palpations: Abdomen is soft. Tenderness: no abdominal tenderness   Musculoskeletal:         General: Normal range of motion. Cervical back: Normal range of motion and neck supple. Lumbar back: Tenderness present. Skin:     General: Skin is warm and dry. Findings: No erythema or rash. Neurological:      Mental Status: She is alert and oriented to person, place, and time. Cranial Nerves: No cranial nerve deficit. Psychiatric:         Judgment: Judgment normal.         All other labs were within normal range or not returned as of this dictation. Vitals:    Vitals:    09/29/22 1514 09/29/22 1520   BP: (!) 180/100 (!) 140/102   Site: Right Upper Arm Right Upper Arm   Position: Sitting Sitting   Cuff Size: Medium Adult Medium Adult   Pulse: 77    Temp: 97 °F (36.1 °C)    TempSrc: Temporal    SpO2: 99%    Weight: 260 lb (117.9 kg)    Height: 5' 5\" (1.651 m)        UA reveals positive nitrites and 2+ blood. Patient will be started on antibiotics for treatment of acute urinary tract infection. Patient will be placed on Macrobid and Pyridium for treatment at home. Increase fluids to help flush out the infection. Patient to have her urine rechecked after treatment completed. Patient verbalizes understanding of plan at discharge and has no further questions. DISPOSITION/PLAN     1. Acute cystitis with hematuria  2. Dysuria     - POCT Urinalysis No Micro (Auto)  - Culture, Urine;  Future

## 2022-10-10 DIAGNOSIS — R73.03 PRE-DIABETES: ICD-10-CM

## 2022-10-10 DIAGNOSIS — E88.81 INSULIN RESISTANCE: ICD-10-CM

## 2022-10-10 DIAGNOSIS — E66.01 MORBID OBESITY WITH BMI OF 40.0-44.9, ADULT (HCC): ICD-10-CM

## 2022-10-13 DIAGNOSIS — F43.0 ACUTE STRESS DISORDER: ICD-10-CM

## 2022-10-14 RX ORDER — BUSPIRONE HYDROCHLORIDE 7.5 MG/1
TABLET ORAL
Qty: 60 TABLET | Refills: 10 | Status: SHIPPED | OUTPATIENT
Start: 2022-10-14

## 2022-10-14 NOTE — TELEPHONE ENCOUNTER
pharmacy requesting medication refill. Please approve or deny this request.    Rx requested:  Requested Prescriptions     Pending Prescriptions Disp Refills    busPIRone (BUSPAR) 7.5 MG tablet [Pharmacy Med Name: BUSPIRONE 7.5 MG TABS 7.5 Tablet] 60 tablet 10     Sig: TAKE 1 TABLET BY MOUTH TWICE DAILY FOR ANXIETY         Last Office Visit:   5/13/2022      Next Visit Date:  No future appointments.

## 2022-11-13 DIAGNOSIS — I10 HYPERTENSION, UNSPECIFIED TYPE: ICD-10-CM

## 2022-11-14 RX ORDER — LOSARTAN POTASSIUM 25 MG/1
25 TABLET ORAL DAILY
Qty: 30 TABLET | Refills: 10 | Status: SHIPPED | OUTPATIENT
Start: 2022-11-14 | End: 2022-11-22 | Stop reason: SDUPTHER

## 2022-11-22 ENCOUNTER — OFFICE VISIT (OUTPATIENT)
Dept: FAMILY MEDICINE CLINIC | Age: 42
End: 2022-11-22
Payer: COMMERCIAL

## 2022-11-22 VITALS
OXYGEN SATURATION: 99 % | BODY MASS INDEX: 44.1 KG/M2 | SYSTOLIC BLOOD PRESSURE: 140 MMHG | DIASTOLIC BLOOD PRESSURE: 98 MMHG | RESPIRATION RATE: 18 BRPM | WEIGHT: 265 LBS | TEMPERATURE: 97 F | HEART RATE: 88 BPM

## 2022-11-22 DIAGNOSIS — R30.0 DYSURIA: ICD-10-CM

## 2022-11-22 DIAGNOSIS — E55.9 VITAMIN D DEFICIENCY: ICD-10-CM

## 2022-11-22 DIAGNOSIS — E03.9 HYPOTHYROIDISM, UNSPECIFIED TYPE: ICD-10-CM

## 2022-11-22 DIAGNOSIS — G47.34 NOCTURNAL HYPOXIA: ICD-10-CM

## 2022-11-22 DIAGNOSIS — Z23 NEED FOR INFLUENZA VACCINATION: ICD-10-CM

## 2022-11-22 DIAGNOSIS — I10 HYPERTENSION, UNSPECIFIED TYPE: ICD-10-CM

## 2022-11-22 DIAGNOSIS — C55 ADENOCARCINOMA OF UTERUS (HCC): Primary | ICD-10-CM

## 2022-11-22 DIAGNOSIS — R53.82 CHRONIC FATIGUE: ICD-10-CM

## 2022-11-22 DIAGNOSIS — R73.03 PRE-DIABETES: ICD-10-CM

## 2022-11-22 DIAGNOSIS — G47.00 INSOMNIA, UNSPECIFIED TYPE: ICD-10-CM

## 2022-11-22 DIAGNOSIS — Z12.31 BREAST CANCER SCREENING BY MAMMOGRAM: ICD-10-CM

## 2022-11-22 LAB
ALBUMIN SERPL-MCNC: 4.3 G/DL (ref 3.5–4.6)
ALP BLD-CCNC: 108 U/L (ref 40–130)
ALT SERPL-CCNC: 47 U/L (ref 0–33)
ANION GAP SERPL CALCULATED.3IONS-SCNC: 11 MEQ/L (ref 9–15)
AST SERPL-CCNC: 35 U/L (ref 0–35)
BILIRUB SERPL-MCNC: 0.4 MG/DL (ref 0.2–0.7)
BUN BLDV-MCNC: 13 MG/DL (ref 6–20)
CALCIUM SERPL-MCNC: 9.6 MG/DL (ref 8.5–9.9)
CHLORIDE BLD-SCNC: 100 MEQ/L (ref 95–107)
CO2: 30 MEQ/L (ref 20–31)
CREAT SERPL-MCNC: 0.92 MG/DL (ref 0.5–0.9)
GFR SERPL CREATININE-BSD FRML MDRD: >60 ML/MIN/{1.73_M2}
GLOBULIN: 2.5 G/DL (ref 2.3–3.5)
GLUCOSE BLD-MCNC: 134 MG/DL (ref 70–99)
HBA1C MFR BLD: 6.1 % (ref 4.8–5.9)
HCT VFR BLD CALC: 41.2 % (ref 37–47)
HEMOGLOBIN: 14.3 G/DL (ref 12–16)
MCH RBC QN AUTO: 31.9 PG (ref 27–31.3)
MCHC RBC AUTO-ENTMCNC: 34.8 % (ref 33–37)
MCV RBC AUTO: 91.8 FL (ref 79.4–94.8)
PDW BLD-RTO: 14.1 % (ref 11.5–14.5)
PLATELET # BLD: 242 K/UL (ref 130–400)
POTASSIUM SERPL-SCNC: 3.7 MEQ/L (ref 3.4–4.9)
RBC # BLD: 4.49 M/UL (ref 4.2–5.4)
SODIUM BLD-SCNC: 141 MEQ/L (ref 135–144)
T4 FREE: 0.96 NG/DL (ref 0.84–1.68)
TOTAL PROTEIN: 6.8 G/DL (ref 6.3–8)
TSH SERPL DL<=0.05 MIU/L-ACNC: 0.93 UIU/ML (ref 0.44–3.86)
WBC # BLD: 9.1 K/UL (ref 4.8–10.8)

## 2022-11-22 PROCEDURE — G0008 ADMIN INFLUENZA VIRUS VAC: HCPCS | Performed by: PHYSICIAN ASSISTANT

## 2022-11-22 PROCEDURE — 90674 CCIIV4 VAC NO PRSV 0.5 ML IM: CPT | Performed by: PHYSICIAN ASSISTANT

## 2022-11-22 PROCEDURE — G8427 DOCREV CUR MEDS BY ELIG CLIN: HCPCS | Performed by: PHYSICIAN ASSISTANT

## 2022-11-22 PROCEDURE — G8417 CALC BMI ABV UP PARAM F/U: HCPCS | Performed by: PHYSICIAN ASSISTANT

## 2022-11-22 PROCEDURE — 3078F DIAST BP <80 MM HG: CPT | Performed by: PHYSICIAN ASSISTANT

## 2022-11-22 PROCEDURE — 99214 OFFICE O/P EST MOD 30 MIN: CPT | Performed by: PHYSICIAN ASSISTANT

## 2022-11-22 PROCEDURE — 1036F TOBACCO NON-USER: CPT | Performed by: PHYSICIAN ASSISTANT

## 2022-11-22 PROCEDURE — 3074F SYST BP LT 130 MM HG: CPT | Performed by: PHYSICIAN ASSISTANT

## 2022-11-22 PROCEDURE — G8482 FLU IMMUNIZE ORDER/ADMIN: HCPCS | Performed by: PHYSICIAN ASSISTANT

## 2022-11-22 RX ORDER — TRAZODONE HYDROCHLORIDE 100 MG/1
TABLET ORAL
Qty: 90 TABLET | Refills: 4 | Status: SHIPPED | OUTPATIENT
Start: 2022-11-22

## 2022-11-22 RX ORDER — LOSARTAN POTASSIUM 50 MG/1
50 TABLET ORAL DAILY
Qty: 90 TABLET | Refills: 4 | Status: SHIPPED | OUTPATIENT
Start: 2022-11-22

## 2022-11-22 SDOH — ECONOMIC STABILITY: FOOD INSECURITY: WITHIN THE PAST 12 MONTHS, THE FOOD YOU BOUGHT JUST DIDN'T LAST AND YOU DIDN'T HAVE MONEY TO GET MORE.: NEVER TRUE

## 2022-11-22 SDOH — ECONOMIC STABILITY: FOOD INSECURITY: WITHIN THE PAST 12 MONTHS, YOU WORRIED THAT YOUR FOOD WOULD RUN OUT BEFORE YOU GOT MONEY TO BUY MORE.: NEVER TRUE

## 2022-11-22 ASSESSMENT — SOCIAL DETERMINANTS OF HEALTH (SDOH): HOW HARD IS IT FOR YOU TO PAY FOR THE VERY BASICS LIKE FOOD, HOUSING, MEDICAL CARE, AND HEATING?: NOT HARD AT ALL

## 2022-11-22 NOTE — PROGRESS NOTES
Vaccine Information Sheet, \"Influenza - Inactivated\"  given to Nallely Moralez, or parent/legal guardian of  Nallely Moralez and verbalized understanding. Patient responses:    Have you ever had a reaction to a flu vaccine? No  Are you able to eat eggs without adverse effects? Yes  Do you have any current illness? No  Have you ever had Guillian Gardiner Syndrome? No    Flu vaccine given per order. Please see immunization tab.

## 2022-11-22 NOTE — PROGRESS NOTES
Subjective  Gisela Cooper, 40 y.o. female presents today with:  Chief Complaint   Patient presents with    Follow-up     General follow up     Discuss Medications     Is unsure which vitamin she is supposed to be taking     Urinary Frequency     Urgency, frequency, burning with urination       HPI  In the office today for follow up. Last OV with me: 5/13/22. Due for mammogram.     Patient had ALEXSANDRA with BSO at [facility] (with Dr. Javier Milner) due to adenocarcinoma. No complications from procedure. She is continuing with f/us. At this time, close monitoring. Denies pelvic pain/bloating. C/o increased urinary frequency and dysuria. Worried about UTI. Unsure if she can leave a sample today. Having increased stress with family relationships. Her legally blind sister live with patient. She is helping with her care. Stress with other family members. Continues with synthroid replacement therapy. History of nocturnal hypoxia. Needs to establish with new pulmonologist.  She last saw Dr. Keri Gamez in 2020. Review of Systems   Constitutional:  Negative for activity change, appetite change, chills, diaphoresis, fatigue, fever and unexpected weight change. HENT:  Negative for nosebleeds and trouble swallowing. Eyes:  Negative for visual disturbance. Respiratory:  Negative for cough, chest tightness, shortness of breath and wheezing. Cardiovascular:  Negative for chest pain and palpitations. Gastrointestinal:  Negative for abdominal distention, abdominal pain, diarrhea, nausea and vomiting. Endocrine: Negative for cold intolerance, heat intolerance, polydipsia, polyphagia and polyuria. Genitourinary:  Positive for dysuria. Negative for difficulty urinating, flank pain, frequency, menstrual problem, pelvic pain, vaginal bleeding, vaginal discharge and vaginal pain. Musculoskeletal:  Negative for arthralgias.    Neurological:  Negative for dizziness, tremors, seizures, syncope, facial asymmetry, speech difficulty, weakness, numbness and headaches. Hematological:  Does not bruise/bleed easily. Psychiatric/Behavioral:  Negative for agitation, confusion, dysphoric mood and sleep disturbance. The patient is nervous/anxious.       Past Medical History:   Diagnosis Date    Adenocarcinoma of uterus ([facility] [address] 5/13/2022    Depression     Diabetes mellitus ([facility] [address]     meds since age 26s    Hypertension     meds since 10/2021    Thyroid disease 2017    meds since age 36s     Past Surgical History:   Procedure Laterality Date    DILATION AND CURETTAGE OF UTERUS N/A 4/4/2022    WITH  HYSTEROSCOPY  DILATATION AND CURETTAGE performed by Millicent Jimenez DO at [facility] Right 2016    ligament repair    KNEE SURGERY Left     I & D due to infection at age 36s     Social History     Socioeconomic History    Marital status: Single     Spouse name: Not on file    Number of children: Not on file    Years of education: Not on file    Highest education level: Not on file   Occupational History    Not on file   Tobacco Use    Smoking status: Never    Smokeless tobacco: Never   Vaping Use    Vaping Use: Never used   Substance and Sexual Activity    Alcohol use: No    Drug use: No    Sexual activity: Never   Other Topics Concern    Not on file   Social History Narrative    Not on file     Social Determinants of Health     Financial Resource Strain: Low Risk     Difficulty of Paying Living Expenses: Not hard at all   Food Insecurity: No Food Insecurity    Worried About Running Out of Food in the Last Year: Never true    Ran Out of Food in the Last Year: Never true   Transportation Needs: Not on file   Physical Activity: Not on file   Stress: Not on file   Social Connections: Not on file   Intimate Partner Violence: Not on file   Housing Stability: Not on file     Family History   Problem Relation Age of Onset    Diabetes Mother     High Blood Pressure Mother     Kidney Disease Mother     Stroke Mother Diabetes Father     High Blood Pressure Father     Diabetes Sister     High Blood Pressure Sister     Other Sister         Legally blind due to diabetes    COPD Brother     Sleep Apnea Brother     High Cholesterol Brother      No Known Allergies  Current Outpatient Medications   Medication Sig Dispense Refill    losartan (COZAAR) 50 MG tablet Take 1 tablet by mouth daily 90 tablet 4    traZODone (DESYREL) 100 MG tablet TAKE 1 TABLET BY MOUTH EVERY NIGHT AT BEDTIME. 90 tablet 4    busPIRone (BUSPAR) 7.5 MG tablet TAKE 1 TABLET BY MOUTH TWICE DAILY FOR ANXIETY 60 tablet 10    metFORMIN (GLUCOPHAGE) 500 MG tablet TAKE 1/2 TABLET BY MOUTH WITH BREAKFAST AND TAKE 1/2 TABLET BY MOUTH WITH DINNER 30 tablet 10    levothyroxine (SYNTHROID) 25 MCG tablet TAKE 1 TABLET BY MOUTH DAILY ON AN EMPTY STOMACH 30 MINUTES BEFORE OTHER MEDICATIONS AND FOOD 30 tablet 10    Blood Glucose Monitoring Suppl (FREESTYLE LITE) w/Device KIT USE AS DIRECTED 1 kit 10    FreeStyle Lancets MISC USE AS DIRECTED TO TEST BLOOD SUGAR DAILY 100 each 10    ondansetron (ZOFRAN-ODT) 4 MG disintegrating tablet Take 1 tablet by mouth 3 times daily as needed for Nausea or Vomiting 21 tablet 0    albuterol sulfate  (90 Base) MCG/ACT inhaler INHALE TWO (2) PUFFS BY MOUTH EVERY 4 HOURS AS NEEDED FOR WHEEZING OR FOR SHORTNESS OF BREATH 8.5 g 10    Alcohol Swabs (EASY TOUCH ALCOHOL PREP MEDIUM) 70 % PADS USE AS DIRECTED. 100 each 10    blood glucose test strips (FREESTYLE LITE) strip USE TO TEST BLOOD SUGAR TWICE DAILY AND AS NEEDED AS DIRECTED FOR SYMPTOMS OF IRREGULAR BLOOD SUGAR 100 strip 3    glucose monitoring (FREESTYLE) kit 1 kit by Does not apply route daily 1 kit 0    Blood Pressure KIT 1 Device by Does not apply route 2 times daily 1 kit 1    Incontinence Supply Disposable (WINGS ADULT BRIEFS XXL) MISC Indications: Excessive Amount of Menstrual Volume Use three times daily as needed.  24 each 3    Incontinence Supply Disposable (ATTENDS BRIEFS LARGE) MISC Use monthly, 2-3 briefs daily for menorrhagia. 24 Bottle 5    vitamin B complex (NATURES BLEND B COMPLEX) TABS tablet Take 1 tablet by mouth daily (Patient not taking: Reported on 11/22/2022) 90 tablet 4    vitamin D (DIALYVITE VITAMIN D 5000) 125 MCG (5000 UT) CAPS capsule Take 1 capsule by mouth daily (Patient not taking: Reported on 11/22/2022) 90 capsule 4    ibuprofen (ADVIL;MOTRIN) 800 MG tablet Take 1 tablet by mouth every 8 hours as needed for Pain (Patient not taking: Reported on 11/22/2022) 20 tablet 0     No current facility-administered medications for this visit. PMH, Surgical Hx, Family Hx, and Social Hx reviewed and updated. Health Maintenance reviewed. Objective  Vitals:    11/22/22 1132 11/22/22 1156   BP: (!) 220/140 (!) 140/98   Site: Left Upper Arm Right Upper Arm   Position: Sitting Sitting   Cuff Size: Medium Adult Medium Adult   Pulse: 88    Resp: 18    Temp: 97 °F (36.1 °C)    TempSrc: Temporal    SpO2: 99%    Weight: 265 lb (120.2 kg)      BP Readings from Last 3 Encounters:   11/22/22 (!) 140/98   09/29/22 (!) 140/102   09/25/22 (!) 159/99     Wt Readings from Last 3 Encounters:   11/22/22 265 lb (120.2 kg)   09/29/22 260 lb (117.9 kg)   09/25/22 265 lb (120.2 kg)     Physical Exam  Constitutional:       General: She is not in acute distress. Appearance: She is well-developed. She is obese. She is not ill-appearing or diaphoretic. HENT:      Head: Normocephalic and atraumatic. Right Ear: External ear normal.      Left Ear: External ear normal.      Nose: Nose normal.      Mouth/Throat:      Mouth: Mucous membranes are moist.   Eyes:      Conjunctiva/sclera: Conjunctivae normal.   Cardiovascular:      Rate and Rhythm: Normal rate and regular rhythm. Heart sounds: Normal heart sounds. No murmur heard. Pulmonary:      Effort: Pulmonary effort is normal. No respiratory distress. Breath sounds: Normal breath sounds. No wheezing or rales. Musculoskeletal:         General: Normal range of motion. Cervical back: Normal range of motion. Skin:     General: Skin is warm and dry. Findings: No erythema or rash. Neurological:      Mental Status: She is alert and oriented to person, place, and time. Psychiatric:         Attention and Perception: Attention normal.         Mood and Affect: Mood is anxious. Assessment & Plan   Kirti Jimenez was seen today for follow-up, discuss medications and urinary frequency. Diagnoses and all orders for this visit:    Adenocarcinoma of uterus ([address]    Dysuria  -     Culture, Urine; Future    Need for influenza vaccination  -     Influenza, FLUCELVAX, (age 10 mo+), IM, Preservative Free, 0.5 mL    Hypertension, unspecified type  -     losartan (COZAAR) 50 MG tablet; Take 1 tablet by mouth daily    Insomnia, unspecified type  -     traZODone (DESYREL) 100 MG tablet; TAKE 1 TABLET BY MOUTH EVERY NIGHT AT BEDTIME. Nocturnal hypoxia  -     Karina Boland MD, Pulmonology, [facility]    Pre-diabetes  -     CBC; Future  -     Comprehensive Metabolic Panel; Future  -     Hemoglobin A1C; Future    Hypothyroidism, unspecified type  -     CBC; Future  -     Comprehensive Metabolic Panel; Future  -     TSH; Future  -     T4, Free; Future    Vitamin D deficiency  -     Vitamin D 25 Hydroxy; Future    Chronic fatigue  -     TSH; Future  -     T4, Free; Future  -     Vitamin B12 & Folate; Future  -     Vitamin D 25 Hydroxy; Future    Breast cancer screening by mammogram  -     [facility] DIGITAL SCREEN W OR WO CAD BILATERAL; Future    Long talk with patient today. Routine labs. Medication for BP adjusted. Referral to sleep clinic. BP check in 2 weeks. Return urine in sterile cup. Orders Placed This Encounter   Procedures    Culture, Urine     Standing Status:   Future     Standing Expiration Date:   11/22/2023     Order Specific Question:   Specify (ex-cath, midstream, cysto, etc)?      Answer:   midstream [facility] DIGITAL SCREEN W OR WO CAD BILATERAL     Standing Status:   Future     Standing Expiration Date:   11/22/2023     Order Specific Question:   Reason for exam:     Answer:   screening    Influenza, FLUCELVAX, (age 10 mo+), IM, Preservative Free, 0.5 mL    CBC     Standing Status:   Future     Number of Occurrences:   1     Standing Expiration Date:   11/22/2023    Comprehensive Metabolic Panel     Standing Status:   Future     Number of Occurrences:   1     Standing Expiration Date:   11/22/2023    Hemoglobin A1C     Standing Status:   Future     Number of Occurrences:   1     Standing Expiration Date:   11/22/2023    TSH     Standing Status:   Future     Number of Occurrences:   1     Standing Expiration Date:   11/22/2023    T4, Free     Standing Status:   Future     Number of Occurrences:   1     Standing Expiration Date:   11/22/2023    Vitamin B12 & Folate     Standing Status:   Future     Number of Occurrences:   1     Standing Expiration Date:   11/22/2023    Vitamin D 25 Hydroxy     Standing Status:   Future     Number of Occurrences:   1     Standing Expiration Date:   11/22/2023    Verónica Marti MD, Pulmonology, [facility]     Referral Priority:   Routine     Referral Type:   Eval and Treat     Referral Reason:   Specialty Services Required     Referred to Provider:   Sylvester Sheppard MD     Requested Specialty:   Pulmonology     Number of Visits Requested:   1     Orders Placed This Encounter   Medications    losartan (COZAAR) 50 MG tablet     Sig: Take 1 tablet by mouth daily     Dispense:  90 tablet     Refill:  4    traZODone (DESYREL) 100 MG tablet     Sig: TAKE 1 TABLET BY MOUTH EVERY NIGHT AT BEDTIME. Dispense:  90 tablet     Refill:  4     Medications Discontinued During This Encounter   Medication Reason    traZODone (DESYREL) 50 MG tablet REORDER    losartan (COZAAR) 25 MG tablet REORDER     No follow-ups on file.       Reviewed with the patient: current clinical status, medications, activities and diet. Side effects, adverse effects of the medication prescribed today, as well as treatment plan/ rationale and result expectations have been discussed with the patient who expresses understanding and desires to proceed. Close follow up to evaluate treatment results and for coordination of care. I have reviewed the patient's medical history in detail and updated the computerized patient record.     Jocelyn William PA-C

## 2022-11-23 ENCOUNTER — TELEPHONE (OUTPATIENT)
Dept: PULMONOLOGY | Age: 42
End: 2022-11-23

## 2022-11-23 LAB
FOLATE: 13.2 NG/ML
VITAMIN B-12: 474 PG/ML (ref 232–1245)
VITAMIN D 25-HYDROXY: 22.5 NG/ML

## 2022-11-23 NOTE — TELEPHONE ENCOUNTER
PATIENT HAS SEEN DR COOK ON 6/16/20 - WHEN PT CALLS BACK SCHEDULE AS A FOLLOW UP WITH DR Gregg Fishman

## 2022-11-28 ENCOUNTER — TELEPHONE (OUTPATIENT)
Dept: FAMILY MEDICINE CLINIC | Age: 42
End: 2022-11-28

## 2022-11-28 NOTE — TELEPHONE ENCOUNTER
----- Message from Keri Vega sent at 11/28/2022 10:39 AM EST -----  Subject: Message to Provider    QUESTIONS  Information for Provider? Patient seen last week, 11/22 and is still   congested in her chest, can  send in a RX ?  ---------------------------------------------------------------------------  --------------  [id number] [employer]  [phone]; OK to leave message on voicemail  ---------------------------------------------------------------------------  --------------  SCRIPT ANSWERS  Relationship to Patient? Other  Representative Name? Lauro Da Silva  Is the Representative on the appropriate HIPAA document in Epic?  Yes

## 2022-11-29 ENCOUNTER — HOSPITAL ENCOUNTER (EMERGENCY)
Age: 42
Discharge: HOME | End: 2022-11-29
Attending: EMERGENCY MEDICINE
Payer: COMMERCIAL

## 2022-11-29 VITALS
BODY MASS INDEX: 42.59 KG/M2 | RESPIRATION RATE: 20 BRPM | WEIGHT: 265 LBS | SYSTOLIC BLOOD PRESSURE: 164 MMHG | OXYGEN SATURATION: 98 % | TEMPERATURE: 98.7 F | HEART RATE: 105 BPM | DIASTOLIC BLOOD PRESSURE: 99 MMHG | HEIGHT: 66 IN

## 2022-11-29 DIAGNOSIS — E55.9 VITAMIN D DEFICIENCY: ICD-10-CM

## 2022-11-29 DIAGNOSIS — R53.82 CHRONIC FATIGUE: ICD-10-CM

## 2022-11-29 DIAGNOSIS — R09.81 NASAL CONGESTION: Primary | ICD-10-CM

## 2022-11-29 PROBLEM — I10 HYPERTENSION: Status: ACTIVE | Noted: 2022-11-29

## 2022-11-29 PROBLEM — R30.0 DYSURIA: Status: ACTIVE | Noted: 2022-11-29

## 2022-11-29 PROBLEM — G47.00 INSOMNIA: Status: ACTIVE | Noted: 2022-11-29

## 2022-11-29 LAB
INFLUENZA A BY PCR: NEGATIVE
INFLUENZA B BY PCR: NEGATIVE
SARS-COV-2, NAAT: NOT DETECTED

## 2022-11-29 PROCEDURE — 99283 EMERGENCY DEPT VISIT LOW MDM: CPT

## 2022-11-29 PROCEDURE — 87635 SARS-COV-2 COVID-19 AMP PRB: CPT

## 2022-11-29 PROCEDURE — 87502 INFLUENZA DNA AMP PROBE: CPT

## 2022-11-29 RX ORDER — IRON HEME POLYPEPTIDE/FOLIC AC 12-1MG
5000 TABLET ORAL DAILY
Qty: 90 CAPSULE | Refills: 4 | Status: SHIPPED | OUTPATIENT
Start: 2022-11-29

## 2022-11-29 ASSESSMENT — ENCOUNTER SYMPTOMS
SHORTNESS OF BREATH: 0
DIARRHEA: 0
ABDOMINAL PAIN: 0
ABDOMINAL DISTENTION: 0
TROUBLE SWALLOWING: 0
COUGH: 0
WHEEZING: 0
CHOKING: 0
VOMITING: 0
CHEST TIGHTNESS: 0
SHORTNESS OF BREATH: 0
NAUSEA: 0

## 2022-11-29 ASSESSMENT — PAIN - FUNCTIONAL ASSESSMENT
PAIN_FUNCTIONAL_ASSESSMENT: NONE - DENIES PAIN
PAIN_FUNCTIONAL_ASSESSMENT: NONE - DENIES PAIN

## 2022-11-30 NOTE — ED PROVIDER NOTES
[address] [facility] ED  EMERGENCY DEPARTMENT ENCOUNTER      Pt Name: Stephanie Reyes  MRN: [id number]  Fleicitagfkody 1980  Date of evaluation: 11/29/2022  Provider: Kye Alvarez [id number]       Chief Complaint   Patient presents with    Nasal Congestion         HISTORY OF PRESENT ILLNESS   (Location/Symptom, Timing/Onset, Context/Setting, Quality, Duration, Modifying Factors, Severity)  Note limiting factors. Stephanie Reyes is a 40 y.o. female who presents to the emergency department for evaluation of nasal congestion. History of hypertension obesity, obstructive sleep apnea. Reports nasal congestion and sinus pressure for 3 days. She said that she was having postnasal drip causing her to spit up phlegm. No fever, no inability to swallow, chest pain or difficulty breathing, vomiting. Has tried DayQuil and NyQuil. HPI    Nursing Notes were reviewed. REVIEW OF SYSTEMS    (2-9 systems for level 4, 10 or more for level 5)     Review of Systems   Constitutional:  Negative for fever. Congestion, postnasal drip   Respiratory:  Negative for choking and shortness of breath. Cardiovascular:  Negative for chest pain. Neurological:  Negative for syncope. All other systems reviewed and are negative. Except as noted above the remainder of the review of systems was reviewed and negative.        PAST MEDICAL HISTORY     Past Medical History:   Diagnosis Date    Adenocarcinoma of uterus ([address] 5/13/2022    Depression     Diabetes mellitus ([address]     meds since age 26s    Hypertension     meds since 10/2021    Thyroid disease 2017    meds since age 36s         SURGICAL HISTORY       Past Surgical History:   Procedure Laterality Date    DILATION AND CURETTAGE OF UTERUS N/A 4/4/2022    WITH  HYSTEROSCOPY  DILATATION AND CURETTAGE performed by Danielito Adler DO at [facility] [place] Right 2016    ligament repair    KNEE SURGERY Left     I & D due to infection at age 36s CURRENT MEDICATIONS       Previous Medications    ALBUTEROL SULFATE  (90 BASE) MCG/ACT INHALER    INHALE TWO (2) PUFFS BY MOUTH EVERY 4 HOURS AS NEEDED FOR WHEEZING OR FOR SHORTNESS OF BREATH    ALCOHOL SWABS (EASY TOUCH ALCOHOL PREP MEDIUM) 70 % PADS    USE AS DIRECTED.    BLOOD GLUCOSE MONITORING SUPPL (FREESTYLE LITE) W/DEVICE KIT    USE AS DIRECTED    BLOOD GLUCOSE TEST STRIPS (FREESTYLE LITE) STRIP    USE TO TEST BLOOD SUGAR TWICE DAILY AND AS NEEDED AS DIRECTED FOR SYMPTOMS OF IRREGULAR BLOOD SUGAR    BLOOD PRESSURE KIT    1 Device by Does not apply route 2 times daily    BUSPIRONE (BUSPAR) 7.5 MG TABLET    TAKE 1 TABLET BY MOUTH TWICE DAILY FOR ANXIETY    FREESTYLE LANCETS MISC    USE AS DIRECTED TO TEST BLOOD SUGAR DAILY    GLUCOSE MONITORING (FREESTYLE) KIT    1 kit by Does not apply route daily    IBUPROFEN (ADVIL;MOTRIN) 800 MG TABLET    Take 1 tablet by mouth every 8 hours as needed for Pain    INCONTINENCE SUPPLY DISPOSABLE (ATTENDS BRIEFS LARGE) MISC    Use monthly, 2-3 briefs daily for menorrhagia. INCONTINENCE SUPPLY DISPOSABLE (WINGS ADULT BRIEFS XXL) MISC    Indications: Excessive Amount of Menstrual Volume Use three times daily as needed. LEVOTHYROXINE (SYNTHROID) 25 MCG TABLET    TAKE 1 TABLET BY MOUTH DAILY ON AN EMPTY STOMACH 30 MINUTES BEFORE OTHER MEDICATIONS AND FOOD    LOSARTAN (COZAAR) 50 MG TABLET    Take 1 tablet by mouth daily    METFORMIN (GLUCOPHAGE) 500 MG TABLET    TAKE 1/2 TABLET BY MOUTH WITH BREAKFAST AND TAKE 1/2 TABLET BY MOUTH WITH DINNER    ONDANSETRON (ZOFRAN-ODT) 4 MG DISINTEGRATING TABLET    Take 1 tablet by mouth 3 times daily as needed for Nausea or Vomiting    TRAZODONE (DESYREL) 100 MG TABLET    TAKE 1 TABLET BY MOUTH EVERY NIGHT AT BEDTIME.     VITAMIN B COMPLEX (NATURES BLEND B COMPLEX) TABS TABLET    Take 1 tablet by mouth daily    VITAMIN D (DIALYVITE VITAMIN D 5000) 125 MCG (5000 UT) CAPS CAPSULE    Take 1 capsule by mouth daily       ALLERGIES Patient has no known allergies. FAMILY HISTORY       Family History   Problem Relation Age of Onset    Diabetes Mother     High Blood Pressure Mother     Kidney Disease Mother     Stroke Mother     Diabetes Father     High Blood Pressure Father     Diabetes Sister     High Blood Pressure Sister     Other Sister         Legally blind due to diabetes    COPD Brother     Sleep Apnea Brother     High Cholesterol Brother           SOCIAL HISTORY       Social History     Socioeconomic History    Marital status: Single     Spouse name: None    Number of children: None    Years of education: None    Highest education level: None   Tobacco Use    Smoking status: Never    Smokeless tobacco: Never   Vaping Use    Vaping Use: Never used   Substance and Sexual Activity    Alcohol use: No    Drug use: No    Sexual activity: Never     Social Determinants of Health     Financial Resource Strain: Low Risk     Difficulty of Paying Living Expenses: Not hard at all   Food Insecurity: No Food Insecurity    Worried About Running Out of Food in the Last Year: Never true    Ran Out of Food in the Last Year: Never true       SCREENINGS         Stayc Coma Scale  Eye Opening: Spontaneous  Best Verbal Response: Oriented  Best Motor Response: Obeys commands  Stacy Coma Scale Score: 15                     CIWA Assessment  BP: (!) 164/99  Heart Rate: (!) 105                 PHYSICAL EXAM    (up to 7 for level 4, 8 or more for level 5)     ED Triage Vitals [11/29/22 2014]   BP Temp Temp Source Heart Rate Resp SpO2 Height Weight   (!) 164/99 98.7 °F (37.1 °C) Oral (!) 105 20 98 % 5' 6\" (1.676 m) 265 lb (120.2 kg)       Physical Exam  Vitals reviewed. Constitutional:       Appearance: She is obese. She is not toxic-appearing or diaphoretic. Comments: hirsutism   HENT:      Head: Normocephalic and atraumatic. Right Ear: Tympanic membrane normal. There is no impacted cerumen.       Left Ear: Tympanic membrane normal. There is no impacted cerumen. Nose: Nose normal.      Mouth/Throat:      Mouth: Mucous membranes are moist.   Eyes:      General: No scleral icterus. Extraocular Movements: Extraocular movements intact. Pupils: Pupils are equal, round, and reactive to light. Cardiovascular:      Rate and Rhythm: Normal rate and regular rhythm. Pulses: Normal pulses. Pulmonary:      Effort: Pulmonary effort is normal. No respiratory distress. Breath sounds: Normal breath sounds. No stridor. No wheezing. Musculoskeletal:      Cervical back: No rigidity. Right lower leg: No edema. Left lower leg: No edema. Skin:     Findings: No rash. Neurological:      Mental Status: She is alert and oriented to person, place, and time. Psychiatric:         Mood and Affect: Mood normal.       DIAGNOSTIC RESULTS     Interpretation per the Radiologist below, if available at the time of this note:    No orders to display         ED BEDSIDE ULTRASOUND:   Performed by ED Physician - none    LABS:  Labs Reviewed   RAPID INFLUENZA A/B ANTIGENS   COVID-19, RAPID       All other labs were within normal range or not returned as of this dictation. EMERGENCY DEPARTMENT COURSE and DIFFERENTIAL DIAGNOSIS/MDM:   Vitals:    Vitals:    11/29/22 2014   BP: (!) 164/99   Pulse: (!) 105   Resp: 20   Temp: 98.7 °F (37.1 °C)   TempSrc: Oral   SpO2: 98%   Weight: 265 lb (120.2 kg)   Height: 5' 6\" (1.676 m)         Influenza and COVID-negative. Vitals reassuring. MDM  Will be treated symptomatically for viral URI, no focal auscultatory findings concerning for pneumonia. Afebrile. CONSULTS:  None    PROCEDURES:  Unless otherwise noted below, none     Procedures      FINAL IMPRESSION      1.  Nasal congestion          DISPOSITION/PLAN   DISPOSITION Decision To Discharge 11/29/2022 10:54:47 PM      PATIENT REFERRED TO:  RAHEL Rodríguez. [address]  [phone]          DISCHARGE MEDICATIONS:  New Prescriptions    LORATADINE-PSEUDOEPHEDRINE (CLARITIN-D 12HR) 5-120 MG PER EXTENDED RELEASE TABLET    Take 1 tablet by mouth 2 times daily for 10 days     Controlled Substances Monitoring:     No flowsheet data found.     (Please note that portions of this note were completed with a voice recognition program.  Efforts were made to edit the dictations but occasionally words are mis-transcribed.)    Dominique Munguia MD (electronically signed)  Attending Emergency Physician            Mara Lewis MD  11/29/22 0790

## 2023-02-10 ENCOUNTER — HOSPITAL ENCOUNTER (OUTPATIENT)
Dept: WOMENS IMAGING | Age: 43
End: 2023-02-10
Payer: COMMERCIAL

## 2023-02-10 DIAGNOSIS — Z12.31 BREAST CANCER SCREENING BY MAMMOGRAM: ICD-10-CM

## 2023-02-10 PROCEDURE — 77067 SCR MAMMO BI INCL CAD: CPT

## 2023-02-27 DIAGNOSIS — R73.03 PRE-DIABETES: ICD-10-CM

## 2023-02-27 RX ORDER — LANCETS 28 GAUGE
EACH MISCELLANEOUS
Qty: 100 EACH | Refills: 3 | Status: SHIPPED | OUTPATIENT
Start: 2023-02-27

## 2023-02-27 NOTE — TELEPHONE ENCOUNTER
Comments:     Last Office Visit (last PCP visit):   11/22/2022    Next Visit Date:  No future appointments. **If hasn't been seen in over a year OR hasn't followed up according to last diabetes/ADHD visit, make appointment for patient before sending refill to provider.     Rx requested:  Requested Prescriptions     Pending Prescriptions Disp Refills    FreeStyle Lancets [address] [facility] [Pharmacy Med Name: LANCETS FREESTYLE 100'S 28G] 100 each 3     Sig: USE AS DIRECTED TO TEST BLOOD SUGAR DAILY

## 2023-03-15 ENCOUNTER — HOSPITAL ENCOUNTER (OUTPATIENT)
Dept: GENERAL RADIOLOGY | Age: 43
Discharge: HOME | End: 2023-03-17
Payer: COMMERCIAL

## 2023-03-15 ENCOUNTER — OFFICE VISIT (OUTPATIENT)
Dept: FAMILY MEDICINE CLINIC | Age: 43
End: 2023-03-15
Payer: COMMERCIAL

## 2023-03-15 VITALS
DIASTOLIC BLOOD PRESSURE: 80 MMHG | BODY MASS INDEX: 43.15 KG/M2 | WEIGHT: 259 LBS | HEART RATE: 62 BPM | OXYGEN SATURATION: 98 % | HEIGHT: 65 IN | TEMPERATURE: 97.3 F | SYSTOLIC BLOOD PRESSURE: 110 MMHG

## 2023-03-15 DIAGNOSIS — R30.0 DYSURIA: ICD-10-CM

## 2023-03-15 DIAGNOSIS — G89.29 CHRONIC MIDLINE LOW BACK PAIN WITHOUT SCIATICA: Primary | ICD-10-CM

## 2023-03-15 DIAGNOSIS — G89.29 CHRONIC MIDLINE LOW BACK PAIN WITHOUT SCIATICA: ICD-10-CM

## 2023-03-15 DIAGNOSIS — C55 ADENOCARCINOMA OF UTERUS (HCC): ICD-10-CM

## 2023-03-15 DIAGNOSIS — M54.50 CHRONIC MIDLINE LOW BACK PAIN WITHOUT SCIATICA: Primary | ICD-10-CM

## 2023-03-15 DIAGNOSIS — E66.01 CLASS 3 SEVERE OBESITY DUE TO EXCESS CALORIES WITHOUT SERIOUS COMORBIDITY WITH BODY MASS INDEX (BMI) OF 40.0 TO 44.9 IN ADULT (HCC): ICD-10-CM

## 2023-03-15 DIAGNOSIS — M54.50 CHRONIC MIDLINE LOW BACK PAIN WITHOUT SCIATICA: ICD-10-CM

## 2023-03-15 PROBLEM — N94.6 DYSMENORRHEA: Status: RESOLVED | Noted: 2019-08-15 | Resolved: 2023-03-15

## 2023-03-15 PROBLEM — R06.02 SOB (SHORTNESS OF BREATH): Chronic | Status: RESOLVED | Noted: 2022-03-28 | Resolved: 2023-03-15

## 2023-03-15 PROBLEM — R03.0 ELEVATED BLOOD PRESSURE READING: Status: RESOLVED | Noted: 2022-05-13 | Resolved: 2023-03-15

## 2023-03-15 PROBLEM — N92.0 MENORRHAGIA WITH REGULAR CYCLE: Status: RESOLVED | Noted: 2019-08-15 | Resolved: 2023-03-15

## 2023-03-15 PROBLEM — N93.8 DUB (DYSFUNCTIONAL UTERINE BLEEDING): Status: RESOLVED | Noted: 2022-03-28 | Resolved: 2023-03-15

## 2023-03-15 LAB
BILIRUBIN, POC: NORMAL
BLOOD URINE, POC: NORMAL
CLARITY, POC: CLEAR
COLOR, POC: CLEAR
GLUCOSE URINE, POC: NORMAL
KETONES, POC: NORMAL
LEUKOCYTE EST, POC: NORMAL
NITRITE, POC: NORMAL
PH, POC: 6
PROTEIN, POC: NORMAL
SPECIFIC GRAVITY, POC: 1.01
UROBILINOGEN, POC: 3.5

## 2023-03-15 PROCEDURE — 1036F TOBACCO NON-USER: CPT | Performed by: PHYSICIAN ASSISTANT

## 2023-03-15 PROCEDURE — 3074F SYST BP LT 130 MM HG: CPT | Performed by: PHYSICIAN ASSISTANT

## 2023-03-15 PROCEDURE — G8417 CALC BMI ABV UP PARAM F/U: HCPCS | Performed by: PHYSICIAN ASSISTANT

## 2023-03-15 PROCEDURE — G8482 FLU IMMUNIZE ORDER/ADMIN: HCPCS | Performed by: PHYSICIAN ASSISTANT

## 2023-03-15 PROCEDURE — 3079F DIAST BP 80-89 MM HG: CPT | Performed by: PHYSICIAN ASSISTANT

## 2023-03-15 PROCEDURE — G8427 DOCREV CUR MEDS BY ELIG CLIN: HCPCS | Performed by: PHYSICIAN ASSISTANT

## 2023-03-15 PROCEDURE — 81003 URINALYSIS AUTO W/O SCOPE: CPT | Performed by: PHYSICIAN ASSISTANT

## 2023-03-15 PROCEDURE — 72110 X-RAY EXAM L-2 SPINE 4/>VWS: CPT

## 2023-03-15 PROCEDURE — 99214 OFFICE O/P EST MOD 30 MIN: CPT | Performed by: PHYSICIAN ASSISTANT

## 2023-03-15 RX ORDER — CYCLOBENZAPRINE HCL 5 MG
5 TABLET ORAL NIGHTLY PRN
Qty: 30 TABLET | Refills: 0 | Status: SHIPPED | OUTPATIENT
Start: 2023-03-15 | End: 2023-03-25

## 2023-03-15 RX ORDER — SULFAMETHOXAZOLE AND TRIMETHOPRIM 800; 160 MG/1; MG/1
1 TABLET ORAL 2 TIMES DAILY
Qty: 20 TABLET | Refills: 0 | Status: SHIPPED | OUTPATIENT
Start: 2023-03-15 | End: 2023-03-25

## 2023-03-15 SDOH — ECONOMIC STABILITY: FOOD INSECURITY: WITHIN THE PAST 12 MONTHS, THE FOOD YOU BOUGHT JUST DIDN'T LAST AND YOU DIDN'T HAVE MONEY TO GET MORE.: NEVER TRUE

## 2023-03-15 SDOH — ECONOMIC STABILITY: HOUSING INSECURITY
IN THE LAST 12 MONTHS, WAS THERE A TIME WHEN YOU DID NOT HAVE A STEADY PLACE TO SLEEP OR SLEPT IN A SHELTER (INCLUDING NOW)?: NO

## 2023-03-15 SDOH — ECONOMIC STABILITY: FOOD INSECURITY: WITHIN THE PAST 12 MONTHS, YOU WORRIED THAT YOUR FOOD WOULD RUN OUT BEFORE YOU GOT MONEY TO BUY MORE.: NEVER TRUE

## 2023-03-15 SDOH — ECONOMIC STABILITY: INCOME INSECURITY: HOW HARD IS IT FOR YOU TO PAY FOR THE VERY BASICS LIKE FOOD, HOUSING, MEDICAL CARE, AND HEATING?: NOT HARD AT ALL

## 2023-03-15 ASSESSMENT — PATIENT HEALTH QUESTIONNAIRE - PHQ9
1. LITTLE INTEREST OR PLEASURE IN DOING THINGS: 1
SUM OF ALL RESPONSES TO PHQ QUESTIONS 1-9: 2
SUM OF ALL RESPONSES TO PHQ QUESTIONS 1-9: 2
2. FEELING DOWN, DEPRESSED OR HOPELESS: 1
SUM OF ALL RESPONSES TO PHQ QUESTIONS 1-9: 2
SUM OF ALL RESPONSES TO PHQ QUESTIONS 1-9: 2
SUM OF ALL RESPONSES TO PHQ9 QUESTIONS 1 & 2: 2

## 2023-03-15 ASSESSMENT — ENCOUNTER SYMPTOMS
SHORTNESS OF BREATH: 0
ABDOMINAL DISTENTION: 0
ABDOMINAL PAIN: 0
NAUSEA: 0
WHEEZING: 0
DIARRHEA: 0
TROUBLE SWALLOWING: 0
VOMITING: 0
BACK PAIN: 1
CHEST TIGHTNESS: 0
COUGH: 0

## 2023-03-15 NOTE — PROGRESS NOTES
Subjective  Seekonk Abts, 40 y.o. female presents today with:  Chief Complaint   Patient presents with    Urinary Tract Infection     Abdominal pain, lower back pain. Worse when bending. Sore when urinating. Some change in smell to urine. No discharge, some hesitancy to go more no urgency or frequency         HPI  Last OV with me: 11/22/22. Continues to be monitored with Dr. Edd Lopez at [facility] for state [place] endometrial CA. History of TLH, BSO 6/30/22. She is followed every 3 months. C/o low back pain with forward flexion. Having some mild dysuria and pelvic fullness. Worried that she has a UTI. Denies injury/trauma. Has tried a heating pad/hot shower--not much relief. OTC motrin/tylenol--not much relief. Denies sciatica. Feels a tightness across lower lumbar. Symptoms have been intermittent over the past year; now more persistent/frequent. Review of Systems   Constitutional:  Negative for activity change, appetite change, chills, diaphoresis, fatigue, fever and unexpected weight change. HENT:  Negative for nosebleeds and trouble swallowing. Eyes:  Negative for visual disturbance. Respiratory:  Negative for cough, chest tightness, shortness of breath and wheezing. Cardiovascular:  Negative for chest pain and palpitations. Gastrointestinal:  Negative for abdominal distention, abdominal pain, diarrhea, nausea and vomiting. Endocrine: Negative for cold intolerance, heat intolerance, polydipsia, polyphagia and polyuria. Genitourinary:  Positive for dysuria. Negative for difficulty urinating, flank pain, frequency, menstrual problem, pelvic pain, vaginal bleeding, vaginal discharge and vaginal pain. Musculoskeletal:  Positive for back pain. Negative for arthralgias, joint swelling and myalgias. Neurological:  Negative for dizziness, tremors, seizures, syncope, facial asymmetry, speech difficulty, weakness, numbness and headaches.    Hematological:  Does not bruise/bleed easily. Psychiatric/Behavioral:  Negative for agitation, confusion, dysphoric mood and sleep disturbance. The patient is nervous/anxious. Past Medical History:   Diagnosis Date    Adenocarcinoma of uterus ([facility] 05/13/2022    Depression     Diabetes mellitus ([facility]     meds since age 26s    Endometrial cancer ([facility]     Hypertension     meds since 10/2021    Thyroid disease 2017    meds since age 36s     Past Surgical History:   Procedure Laterality Date    DILATION AND CURETTAGE OF UTERUS N/A 04/04/2022    WITH  HYSTEROSCOPY  DILATATION AND CURETTAGE performed by Enid Dasilva DO at [facility] ([address] [place] [address])      KNEE SURGERY Right 2016    ligament repair    KNEE SURGERY Left     I & D due to infection at age 36s    OVARY REMOVAL       Social History     Socioeconomic History    Marital status: Single     Spouse name: Not on file    Number of children: Not on file    Years of education: Not on file    Highest education level: Not on file   Occupational History    Not on file   Tobacco Use    Smoking status: Never    Smokeless tobacco: Never   Vaping Use    Vaping Use: Never used   Substance and Sexual Activity    Alcohol use: No    Drug use: No    Sexual activity: Never   Other Topics Concern    Not on file   Social History Narrative    Not on file     Social Determinants of Health     Financial Resource Strain: Low Risk     Difficulty of Paying Living Expenses: Not hard at all   Food Insecurity: No Food Insecurity    Worried About Running Out of Food in the Last Year: Never true    [address] in the Last Year: Never true   Transportation Needs: Unknown    Lack of Transportation (Medical): Not on file    Lack of Transportation (Non-Medical):  No   Physical Activity: Not on file   Stress: Not on file   Social Connections: Not on file   Intimate Partner Violence: Not on file   Housing Stability: Unknown    Unable to Pay for Housing in the Last Year: Not on file    Number of Places Lived in the Last Year: Not on file    Unstable Housing in the Last Year: No     Family History   Problem Relation Age of Onset    Diabetes Mother     High Blood Pressure Mother     Kidney Disease Mother     Stroke Mother     Diabetes Father     High Blood Pressure Father     Diabetes Sister     High Blood Pressure Sister     Other Sister         Legally blind due to diabetes    COPD Brother     Sleep Apnea Brother     High Cholesterol Brother     Breast Cancer Maternal Aunt     Breast Cancer Maternal Aunt     Breast Cancer Paternal Aunt      No Known Allergies  Current Outpatient Medications   Medication Sig Dispense Refill    cyclobenzaprine (FLEXERIL) 5 MG tablet Take 1 tablet by mouth nightly as needed for Muscle spasms 30 tablet 0    sulfamethoxazole-trimethoprim (BACTRIM DS) 800-160 MG per tablet Take 1 tablet by mouth 2 times daily for 10 days 20 tablet 0    FreeStyle Lancets MISC USE AS DIRECTED TO TEST BLOOD SUGAR DAILY 100 each 3    vitamin D (DIALYVITE VITAMIN D 5000) 125 MCG (5000 UT) CAPS capsule Take 1 capsule by mouth daily 90 capsule 4    losartan (COZAAR) 50 MG tablet Take 1 tablet by mouth daily 90 tablet 4    traZODone (DESYREL) 100 MG tablet TAKE 1 TABLET BY MOUTH EVERY NIGHT AT BEDTIME.  90 tablet 4    busPIRone (BUSPAR) 7.5 MG tablet TAKE 1 TABLET BY MOUTH TWICE DAILY FOR ANXIETY 60 tablet 10    metFORMIN (GLUCOPHAGE) 500 MG tablet TAKE 1/2 TABLET BY MOUTH WITH BREAKFAST AND TAKE 1/2 TABLET BY MOUTH WITH DINNER 30 tablet 10    levothyroxine (SYNTHROID) 25 MCG tablet TAKE 1 TABLET BY MOUTH DAILY ON AN EMPTY STOMACH 30 MINUTES BEFORE OTHER MEDICATIONS AND FOOD 30 tablet 10    Blood Glucose Monitoring Suppl (FREESTYLE LITE) w/Device KIT USE AS DIRECTED 1 kit 10    ondansetron (ZOFRAN-ODT) 4 MG disintegrating tablet Take 1 tablet by mouth 3 times daily as needed for Nausea or Vomiting 21 tablet 0    vitamin B complex (NATURES BLEND B COMPLEX) TABS tablet Take 1 tablet by mouth daily 90 tablet 4    albuterol sulfate  (90 Base) MCG/ACT inhaler INHALE TWO (2) PUFFS BY MOUTH EVERY 4 HOURS AS NEEDED FOR WHEEZING OR FOR SHORTNESS OF BREATH 8.5 g 10    Alcohol Swabs (EASY TOUCH ALCOHOL PREP MEDIUM) 70 % PADS USE AS DIRECTED. 100 each 10    blood glucose test strips (FREESTYLE LITE) strip USE TO TEST BLOOD SUGAR TWICE DAILY AND AS NEEDED AS DIRECTED FOR SYMPTOMS OF IRREGULAR BLOOD SUGAR 100 strip 3    glucose monitoring (FREESTYLE) kit 1 kit by Does not apply route daily 1 kit 0    Blood Pressure KIT 1 Device by Does not apply route 2 times daily 1 kit 1    Incontinence Supply Disposable (WINGS ADULT BRIEFS XXL) MISC Indications: Excessive Amount of Menstrual Volume Use three times daily as needed. 24 each 3    Incontinence Supply Disposable (ATTENDS BRIEFS LARGE) MISC Use monthly, 2-3 briefs daily for menorrhagia. 24 Bottle 5     No current facility-administered medications for this visit. PMH, Surgical Hx, Family Hx, and Social Hx reviewed and updated. Health Maintenance reviewed. Objective  Vitals:    03/15/23 1123   BP: 110/80   Pulse: 62   Temp: 97.3 °F (36.3 °C)   SpO2: 98%   Weight: 259 lb (117.5 kg)   Height: 5' 5\" (1.651 m)     BP Readings from Last 3 Encounters:   03/15/23 110/80   11/29/22 (!) 164/99   11/22/22 (!) 140/98     Wt Readings from Last 3 Encounters:   03/15/23 259 lb (117.5 kg)   11/29/22 265 lb (120.2 kg)   11/22/22 265 lb (120.2 kg)     Physical Exam  Vitals reviewed. Constitutional:       General: She is not in acute distress. Appearance: She is well-developed. She is obese. She is not ill-appearing or diaphoretic. HENT:      Head: Normocephalic and atraumatic. Right Ear: External ear normal.      Left Ear: External ear normal.      Nose: Nose normal.      Mouth/Throat:      Mouth: Mucous membranes are moist.   Eyes:      Conjunctiva/sclera: Conjunctivae normal.   Cardiovascular:      Rate and Rhythm: Normal rate and regular rhythm.       Heart sounds: Normal heart sounds. No murmur heard. Pulmonary:      Effort: Pulmonary effort is normal. No respiratory distress. Breath sounds: Normal breath sounds. No wheezing or rales. Musculoskeletal:         General: Normal range of motion. Cervical back: Normal range of motion. Back:    Skin:     General: Skin is warm and dry. Findings: No erythema or rash. Neurological:      General: No focal deficit present. Mental Status: She is alert and oriented to person, place, and time. Psychiatric:         Attention and Perception: Attention normal.         Mood and Affect: Mood is anxious. Assessment & Plan    Diagnosis Orders   1. Chronic midline low back pain without sciatica  cyclobenzaprine (FLEXERIL) 5 MG tablet      2. Dysuria  POCT Urinalysis No Micro (Auto)    Culture, Urine    sulfamethoxazole-trimethoprim (BACTRIM DS) 800-160 MG per tablet      3. Adenocarcinoma of uterus ([address]        4. Class 3 severe obesity due to excess calories without serious comorbidity with body mass index (BMI) of 40.0 to 44.9 in [facility])        Urine for culture. Xray of lumbar spine. 3-4 month follow up with me. Orders Placed This Encounter   Procedures    Culture, Urine     Standing Status:   Future     Standing Expiration Date:   3/15/2024     Order Specific Question:   Specify (ex-cath, midstream, cysto, etc)? Answer:   midstream    POCT Urinalysis No Micro (Auto)     Orders Placed This Encounter   Medications    cyclobenzaprine (FLEXERIL) 5 MG tablet     Sig: Take 1 tablet by mouth nightly as needed for Muscle spasms     Dispense:  30 tablet     Refill:  0    sulfamethoxazole-trimethoprim (BACTRIM DS) 800-160 MG per tablet     Sig: Take 1 tablet by mouth 2 times daily for 10 days     Dispense:  20 tablet     Refill:  0     Medications Discontinued During This Encounter   Medication Reason    ibuprofen (ADVIL;MOTRIN) 800 MG tablet LIST CLEANUP     No follow-ups on file.       Reviewed with the patient: current clinical status, medications, activities and diet. Side effects, adverse effects of the medication prescribed today, as well as treatment plan/ rationale and result expectations have been discussed with the patient who expresses understanding and desires to proceed. Close follow up to evaluate treatment results and for coordination of care. I have reviewed the patient's medical history in detail and updated the computerized patient record.     Jocelyn William PA-C

## 2023-03-16 DIAGNOSIS — R06.02 SOB (SHORTNESS OF BREATH) ON EXERTION: ICD-10-CM

## 2023-03-16 LAB — BACTERIA UR CULT: NORMAL

## 2023-03-17 DIAGNOSIS — M54.50 CHRONIC MIDLINE LOW BACK PAIN WITHOUT SCIATICA: Primary | ICD-10-CM

## 2023-03-17 DIAGNOSIS — G89.29 CHRONIC MIDLINE LOW BACK PAIN WITHOUT SCIATICA: Primary | ICD-10-CM

## 2023-03-17 RX ORDER — ALBUTEROL SULFATE 90 UG/1
AEROSOL, METERED RESPIRATORY (INHALATION)
Qty: 8.5 G | Refills: 10 | Status: SHIPPED | OUTPATIENT
Start: 2023-03-17

## 2023-03-17 NOTE — TELEPHONE ENCOUNTER
Patient is requesting medication refill. Please approve or deny this request.    Rx requested:  Requested Prescriptions     Pending Prescriptions Disp Refills    albuterol sulfate HFA (PROVENTIL;VENTOLIN;PROAIR) 108 (90 Base) MCG/ACT inhaler [Pharmacy Med Name: ALBUTEROL HFA *PROA* 90MCG 108 (90 BAS Aerosol] 8.5 g 10     Sig: INHALE 2 PUFFS BY MOUTH EVERY 4 HOURS AS NEEDED FOR SHORTNESS OF BREATH AND WHEEZING         Last Office Visit:   3/15/2023      Next Visit Date:  No future appointments.

## 2023-04-02 DIAGNOSIS — M54.50 CHRONIC MIDLINE LOW BACK PAIN WITHOUT SCIATICA: Primary | ICD-10-CM

## 2023-04-02 DIAGNOSIS — G89.29 CHRONIC MIDLINE LOW BACK PAIN WITHOUT SCIATICA: Primary | ICD-10-CM

## 2023-04-20 DIAGNOSIS — R73.03 PRE-DIABETES: ICD-10-CM

## 2023-04-21 RX ORDER — LANCETS 28 GAUGE
EACH MISCELLANEOUS
Qty: 100 EACH | Refills: 10 | Status: SHIPPED | OUTPATIENT
Start: 2023-04-21

## 2023-04-26 DIAGNOSIS — R73.03 PRE-DIABETES: ICD-10-CM

## 2023-04-27 RX ORDER — LANCETS 28 GAUGE
EACH MISCELLANEOUS
Qty: 100 EACH | Refills: 10 | OUTPATIENT
Start: 2023-04-27

## 2023-05-08 DIAGNOSIS — R06.02 SOB (SHORTNESS OF BREATH) ON EXERTION: ICD-10-CM

## 2023-05-08 RX ORDER — ALBUTEROL SULFATE 90 UG/1
AEROSOL, METERED RESPIRATORY (INHALATION)
Qty: 25.5 G | Refills: 2 | OUTPATIENT
Start: 2023-05-08

## 2023-06-29 ENCOUNTER — TELEPHONE (OUTPATIENT)
Dept: FAMILY MEDICINE CLINIC | Age: 43
End: 2023-06-29

## 2023-07-10 ENCOUNTER — OFFICE VISIT (OUTPATIENT)
Dept: FAMILY MEDICINE CLINIC | Age: 43
End: 2023-07-10
Payer: COMMERCIAL

## 2023-07-10 VITALS
WEIGHT: 253 LBS | SYSTOLIC BLOOD PRESSURE: 130 MMHG | BODY MASS INDEX: 42.15 KG/M2 | HEART RATE: 90 BPM | RESPIRATION RATE: 16 BRPM | DIASTOLIC BLOOD PRESSURE: 88 MMHG | OXYGEN SATURATION: 97 % | HEIGHT: 65 IN

## 2023-07-10 DIAGNOSIS — R20.0 NUMBNESS AND TINGLING IN BOTH HANDS: ICD-10-CM

## 2023-07-10 DIAGNOSIS — G47.34 NOCTURNAL HYPOXIA: ICD-10-CM

## 2023-07-10 DIAGNOSIS — I10 HYPERTENSION, UNSPECIFIED TYPE: ICD-10-CM

## 2023-07-10 DIAGNOSIS — R73.03 PRE-DIABETES: ICD-10-CM

## 2023-07-10 DIAGNOSIS — E66.01 CLASS 3 SEVERE OBESITY DUE TO EXCESS CALORIES WITHOUT SERIOUS COMORBIDITY WITH BODY MASS INDEX (BMI) OF 40.0 TO 44.9 IN ADULT (HCC): Primary | ICD-10-CM

## 2023-07-10 DIAGNOSIS — R20.2 NUMBNESS AND TINGLING OF BOTH FEET: ICD-10-CM

## 2023-07-10 DIAGNOSIS — R20.0 NUMBNESS AND TINGLING OF BOTH FEET: ICD-10-CM

## 2023-07-10 DIAGNOSIS — E88.81 INSULIN RESISTANCE: ICD-10-CM

## 2023-07-10 DIAGNOSIS — R20.2 NUMBNESS AND TINGLING IN BOTH HANDS: ICD-10-CM

## 2023-07-10 DIAGNOSIS — G47.33 OSA (OBSTRUCTIVE SLEEP APNEA): ICD-10-CM

## 2023-07-10 DIAGNOSIS — R23.4 FISSURE IN SKIN OF FOOT: ICD-10-CM

## 2023-07-10 DIAGNOSIS — R53.82 CHRONIC FATIGUE: ICD-10-CM

## 2023-07-10 PROCEDURE — 3075F SYST BP GE 130 - 139MM HG: CPT | Performed by: PHYSICIAN ASSISTANT

## 2023-07-10 PROCEDURE — G8417 CALC BMI ABV UP PARAM F/U: HCPCS | Performed by: PHYSICIAN ASSISTANT

## 2023-07-10 PROCEDURE — 3079F DIAST BP 80-89 MM HG: CPT | Performed by: PHYSICIAN ASSISTANT

## 2023-07-10 PROCEDURE — 99214 OFFICE O/P EST MOD 30 MIN: CPT | Performed by: PHYSICIAN ASSISTANT

## 2023-07-10 PROCEDURE — 1036F TOBACCO NON-USER: CPT | Performed by: PHYSICIAN ASSISTANT

## 2023-07-10 PROCEDURE — G8427 DOCREV CUR MEDS BY ELIG CLIN: HCPCS | Performed by: PHYSICIAN ASSISTANT

## 2023-07-10 RX ORDER — LOSARTAN POTASSIUM AND HYDROCHLOROTHIAZIDE 12.5; 5 MG/1; MG/1
1 TABLET ORAL DAILY
Qty: 90 TABLET | Refills: 3 | Status: SHIPPED | OUTPATIENT
Start: 2023-07-10

## 2023-07-10 ASSESSMENT — ENCOUNTER SYMPTOMS
ABDOMINAL PAIN: 0
VOMITING: 0
WHEEZING: 0
DIARRHEA: 0
ABDOMINAL DISTENTION: 0
CHEST TIGHTNESS: 0
TROUBLE SWALLOWING: 0
NAUSEA: 0
SHORTNESS OF BREATH: 0
COUGH: 0
BACK PAIN: 1

## 2023-07-10 NOTE — PROGRESS NOTES
Subjective  Levon Melendrez, 40 y.o. female presents today with:  Chief Complaint   Patient presents with    Follow-up     Discuss hospital bed, states she has trouble breathing when she is laying flat needs to lay propped up     Tingling     Has tingling in her left hand      HPI  In the office today for follow up. Last OV with me: 3/15/23. Due for labs--for routine monitoring. Would like to discuss chronic fatigue and trouble sleeping. Has had sleep study completed in the past in 2019--this showed hypoxia but no ALDO. She frequently will wake up throughout the night. Feels like something is pressing against her chest when she lays flat. Has tried to elevate herself with pillows, but they often will move as she is sleeping. Trying to lose weight with dietary modification and activity. C/o mild edema of feet/ankles. Trying to modify her salt intake. Drinking lots of water/fluids. She is on losartan 50 mg daily. Will elevate her legs when sitting. Would like to discuss numbness and tingling in her hands and feet. Has noticed some tingling in both hands and feet. No injury/trauma. Some mild  strength loss of her hands. Was having intermittent low back pain. Xray of low back does show DDD/arthritic changes. C/o fissure of L heel. Denies pain/injury. Skin has cracked. Review of Systems   Constitutional:  Positive for fatigue. Negative for activity change, appetite change, chills, diaphoresis, fever and unexpected weight change. HENT:  Negative for nosebleeds and trouble swallowing. Eyes:  Negative for visual disturbance. Respiratory:  Negative for cough, chest tightness, shortness of breath and wheezing. Cardiovascular:  Positive for leg swelling (mild). Negative for chest pain and palpitations. Gastrointestinal:  Negative for abdominal distention, abdominal pain, diarrhea, nausea and vomiting.    Endocrine: Negative for cold intolerance, heat

## 2023-07-13 ENCOUNTER — TELEPHONE (OUTPATIENT)
Dept: FAMILY MEDICINE CLINIC | Age: 43
End: 2023-07-13

## 2023-07-13 NOTE — TELEPHONE ENCOUNTER
Rocio Kumar is faxing the hospital bed order. The hyzaar is the new medication with the low-dose water pill. That was sent to pharmacy.

## 2023-07-13 NOTE — TELEPHONE ENCOUNTER
Patient states that you were going to put her on a water pill and is asking if you will send that to [facility] on [address]. in [place]. She is also asking where the Rx for the hospital bed was sent. Please Advise.     Callback # [phone]; ok to leave a VM

## 2023-07-19 ENCOUNTER — OFFICE VISIT (OUTPATIENT)
Dept: PULMONOLOGY | Age: 43
End: 2023-07-19
Payer: COMMERCIAL

## 2023-07-19 ENCOUNTER — TELEPHONE (OUTPATIENT)
Dept: FAMILY MEDICINE CLINIC | Age: 43
End: 2023-07-19

## 2023-07-19 VITALS
DIASTOLIC BLOOD PRESSURE: 84 MMHG | BODY MASS INDEX: 41.65 KG/M2 | HEART RATE: 113 BPM | SYSTOLIC BLOOD PRESSURE: 118 MMHG | OXYGEN SATURATION: 98 % | TEMPERATURE: 97.6 F | WEIGHT: 250 LBS | HEIGHT: 65 IN

## 2023-07-19 DIAGNOSIS — G47.33 OSA (OBSTRUCTIVE SLEEP APNEA): ICD-10-CM

## 2023-07-19 DIAGNOSIS — R06.02 SOB (SHORTNESS OF BREATH): ICD-10-CM

## 2023-07-19 DIAGNOSIS — G47.10 HYPERSOMNIA: Primary | ICD-10-CM

## 2023-07-19 DIAGNOSIS — E66.9 OBESITY, UNSPECIFIED CLASSIFICATION, UNSPECIFIED OBESITY TYPE, UNSPECIFIED WHETHER SERIOUS COMORBIDITY PRESENT: ICD-10-CM

## 2023-07-19 DIAGNOSIS — J45.909 UNCOMPLICATED ASTHMA, UNSPECIFIED ASTHMA SEVERITY, UNSPECIFIED WHETHER PERSISTENT: ICD-10-CM

## 2023-07-19 DIAGNOSIS — R05.9 COUGH, UNSPECIFIED TYPE: ICD-10-CM

## 2023-07-19 PROCEDURE — 99204 OFFICE O/P NEW MOD 45 MIN: CPT | Performed by: INTERNAL MEDICINE

## 2023-07-19 PROCEDURE — 1036F TOBACCO NON-USER: CPT | Performed by: INTERNAL MEDICINE

## 2023-07-19 PROCEDURE — G8417 CALC BMI ABV UP PARAM F/U: HCPCS | Performed by: INTERNAL MEDICINE

## 2023-07-19 PROCEDURE — 3074F SYST BP LT 130 MM HG: CPT | Performed by: INTERNAL MEDICINE

## 2023-07-19 PROCEDURE — G8428 CUR MEDS NOT DOCUMENT: HCPCS | Performed by: INTERNAL MEDICINE

## 2023-07-19 PROCEDURE — 3079F DIAST BP 80-89 MM HG: CPT | Performed by: INTERNAL MEDICINE

## 2023-07-19 NOTE — TELEPHONE ENCOUNTER
----- Message from Yonghong Tech sent at 7/18/2023  2:12 PM EDT -----  Subject: Message to Provider    QUESTIONS  Information for Provider? Patient is advising prescription for water pill   never went to pharmacy - [facility]   ---------------------------------------------------------------------------  --------------  [address]  [phone]; OK to leave message on voicemail  ---------------------------------------------------------------------------  --------------  SCRIPT ANSWERS  Relationship to Patient?  Self

## 2023-07-20 DIAGNOSIS — E88.81 INSULIN RESISTANCE: ICD-10-CM

## 2023-07-20 DIAGNOSIS — E66.01 CLASS 3 SEVERE OBESITY DUE TO EXCESS CALORIES WITHOUT SERIOUS COMORBIDITY WITH BODY MASS INDEX (BMI) OF 40.0 TO 44.9 IN ADULT (HCC): ICD-10-CM

## 2023-07-20 DIAGNOSIS — R73.03 PRE-DIABETES: ICD-10-CM

## 2023-07-20 DIAGNOSIS — R53.82 CHRONIC FATIGUE: ICD-10-CM

## 2023-07-20 LAB
ALBUMIN SERPL-MCNC: 4.4 G/DL (ref 3.5–4.6)
ALP SERPL-CCNC: 91 U/L (ref 40–130)
ALT SERPL-CCNC: 40 U/L (ref 0–33)
ANION GAP SERPL CALCULATED.3IONS-SCNC: 12 MEQ/L (ref 9–15)
AST SERPL-CCNC: 28 U/L (ref 0–35)
BILIRUB SERPL-MCNC: 0.5 MG/DL (ref 0.2–0.7)
BUN SERPL-MCNC: 13 MG/DL (ref 6–20)
CALCIUM SERPL-MCNC: 9.7 MG/DL (ref 8.5–9.9)
CHLORIDE SERPL-SCNC: 107 MEQ/L (ref 95–107)
CO2 SERPL-SCNC: 26 MEQ/L (ref 20–31)
CREAT SERPL-MCNC: 0.89 MG/DL (ref 0.5–0.9)
ERYTHROCYTE [DISTWIDTH] IN BLOOD BY AUTOMATED COUNT: 14.2 % (ref 11.5–14.5)
FOLATE: 16.2 NG/ML
GLOBULIN SER CALC-MCNC: 2.7 G/DL (ref 2.3–3.5)
GLUCOSE SERPL-MCNC: 81 MG/DL (ref 70–99)
HBA1C MFR BLD: 5.7 % (ref 4.8–5.9)
HCT VFR BLD AUTO: 45.7 % (ref 37–47)
HGB BLD-MCNC: 15.4 G/DL (ref 12–16)
MCH RBC QN AUTO: 30.8 PG (ref 27–31.3)
MCHC RBC AUTO-ENTMCNC: 33.6 % (ref 33–37)
MCV RBC AUTO: 91.4 FL (ref 79.4–94.8)
PLATELET # BLD AUTO: 232 K/UL (ref 130–400)
POTASSIUM SERPL-SCNC: 4.1 MEQ/L (ref 3.4–4.9)
PROT SERPL-MCNC: 7.1 G/DL (ref 6.3–8)
RBC # BLD AUTO: 4.99 M/UL (ref 4.2–5.4)
SODIUM SERPL-SCNC: 145 MEQ/L (ref 135–144)
VITAMIN B-12: 578 PG/ML (ref 232–1245)
VITAMIN D 25-HYDROXY: 27 NG/ML
WBC # BLD AUTO: 7.7 K/UL (ref 4.8–10.8)

## 2023-07-21 ENCOUNTER — HOSPITAL ENCOUNTER (OUTPATIENT)
Dept: NEUROLOGY | Age: 43
Discharge: HOME | End: 2023-07-21
Payer: COMMERCIAL

## 2023-07-21 DIAGNOSIS — R20.0 NUMBNESS AND TINGLING IN BOTH HANDS: ICD-10-CM

## 2023-07-21 DIAGNOSIS — R20.2 NUMBNESS AND TINGLING OF BOTH FEET: ICD-10-CM

## 2023-07-21 DIAGNOSIS — R20.2 NUMBNESS AND TINGLING IN BOTH HANDS: ICD-10-CM

## 2023-07-21 DIAGNOSIS — R20.0 NUMBNESS AND TINGLING OF BOTH FEET: ICD-10-CM

## 2023-07-21 PROCEDURE — 95886 MUSC TEST DONE W/N TEST COMP: CPT

## 2023-07-21 PROCEDURE — 95910 NRV CNDJ TEST 7-8 STUDIES: CPT

## 2023-07-21 NOTE — PROCEDURES
[facility] [place], [address] [facility]                             ELECTROMYOGRAM REPORT    PATIENT NAME: Maisha Barrios                 :        1980  MED REC NO:   [id number]                            ROOM:  ACCOUNT NO:   [de-identified]                           ADMIT DATE: 2023  PROVIDER:     Kecia Massey MD    DATE OF EM2023    REFERRING PROVIDER:  Libra William PA-C.    REASON FOR STUDY:  The patient was having numbness in the hands. She  has a history of diabetes mellitus type 2, non-insulin dependent. FINDINGS:  Motor nerve conduction velocities and F-wave latencies are  normal in all the nerves tested. Distal motor latencies are normal in all the nerves tested. Distal sensory latencies are borderline normal in the right median nerve  and normal in other nerves tested. On concentric needle electrode examination, no significant denervation  changes are seen. CLINICAL INTERPRETATION:  The patient has symptoms of carpal tunnel  syndrome bilaterally. She is being tried on conservative management. If clinically indicated, we could repeat the study in a year. Thank you Ms. William for allowing me to see this patient. Please feel free  to call me if I can be of any further assistance regarding this  patient's the patient.         Kecia Massey MD    D: 2023 14:34:56       T: 2023 14:37:52     DM/S_WEEK_  Job#: [id number]     Doc#: [id number]    CC:

## 2023-07-23 LAB — VIT B1 SERPL-MCNC: 11 NMOL/L (ref 4–15)

## 2023-07-24 ENCOUNTER — TELEPHONE (OUTPATIENT)
Dept: FAMILY MEDICINE CLINIC | Age: 43
End: 2023-07-24

## 2023-07-24 NOTE — TELEPHONE ENCOUNTER
----- Message from Tuyet Ye sent at 7/24/2023  2:47 PM EDT -----  Subject: Message to Provider    QUESTIONS  Information for Provider? patient says she has arthritis in her back and   she is wanting to know what kind it is and is there something the  can   give her for pain? She is having trouble bending over. ---------------------------------------------------------------------------  --------------  Jordon Zhang [facility]  [phone]; OK to leave message on voicemail  ---------------------------------------------------------------------------  --------------  SCRIPT ANSWERS  Relationship to Patient?  Self

## 2023-07-24 NOTE — TELEPHONE ENCOUNTER
----- Message from Jose Metcalf sent at 7/24/2023  2:45 PM EDT -----  Subject: Message to Provider    QUESTIONS  Information for Provider? patient is calling saying that the place that   the hospital bed is being ordered needs more information in order to   process this. She is not sure what but they told her this.   ---------------------------------------------------------------------------  --------------  Donavan Scott INFO  [phone]; OK to leave message on voicemail  ---------------------------------------------------------------------------  --------------  SCRIPT ANSWERS  Relationship to Patient?  Self

## 2023-07-26 DIAGNOSIS — M54.42 ACUTE MIDLINE LOW BACK PAIN WITH LEFT-SIDED SCIATICA: Primary | ICD-10-CM

## 2023-07-26 RX ORDER — METHYLPREDNISOLONE 4 MG/1
TABLET ORAL
Qty: 1 KIT | Refills: 0 | Status: SHIPPED | OUTPATIENT
Start: 2023-07-26 | End: 2023-08-01

## 2023-07-26 NOTE — TELEPHONE ENCOUNTER
Steroid taper sent to pharmacy. Physical therapy also ordered. IF not improving after 1-2 weeks, let me know. We will discuss ortho/spine options.

## 2023-07-31 ENCOUNTER — TELEPHONE (OUTPATIENT)
Dept: FAMILY MEDICINE CLINIC | Age: 43
End: 2023-07-31

## 2023-07-31 NOTE — TELEPHONE ENCOUNTER
Patient states that the paperwork that was faxed to us for the hospital bed had stars on it in the places that needed to be filled out. Not all of the places with stars were filled out. They are requesting this paperwork be filled out completely and faxed back to [facility].     Callback # [phone]

## 2023-08-01 DIAGNOSIS — M54.42 ACUTE MIDLINE LOW BACK PAIN WITH LEFT-SIDED SCIATICA: ICD-10-CM

## 2023-08-03 RX ORDER — METHYLPREDNISOLONE 4 MG/1
TABLET ORAL
Qty: 21 TABLET | Refills: 10 | OUTPATIENT
Start: 2023-08-03

## 2023-08-07 DIAGNOSIS — M54.42 ACUTE MIDLINE LOW BACK PAIN WITH LEFT-SIDED SCIATICA: ICD-10-CM

## 2023-08-08 ENCOUNTER — TELEPHONE (OUTPATIENT)
Dept: FAMILY MEDICINE CLINIC | Age: 43
End: 2023-08-08

## 2023-08-08 DIAGNOSIS — M54.42 ACUTE MIDLINE LOW BACK PAIN WITH LEFT-SIDED SCIATICA: ICD-10-CM

## 2023-08-08 RX ORDER — METHYLPREDNISOLONE 4 MG/1
TABLET ORAL
Qty: 21 TABLET | Refills: 10 | OUTPATIENT
Start: 2023-08-08

## 2023-08-08 NOTE — TELEPHONE ENCOUNTER
Patient states that [facility] needs more information for the hospital bed. The only information the patient could remember that was asked for was what is the reason the bed is needed.     Callback # for Jeremy Simental [phone]

## 2023-08-09 ENCOUNTER — HOSPITAL ENCOUNTER (OUTPATIENT)
Dept: PULMONOLOGY | Age: 43
Discharge: HOME | End: 2023-08-09
Payer: COMMERCIAL

## 2023-08-09 DIAGNOSIS — R73.03 PRE-DIABETES: ICD-10-CM

## 2023-08-09 DIAGNOSIS — E88.81 INSULIN RESISTANCE: ICD-10-CM

## 2023-08-09 DIAGNOSIS — E66.01 MORBID OBESITY WITH BMI OF 40.0-44.9, ADULT (HCC): ICD-10-CM

## 2023-08-09 DIAGNOSIS — R06.02 SOB (SHORTNESS OF BREATH): ICD-10-CM

## 2023-08-09 DIAGNOSIS — E03.9 HYPOTHYROIDISM, UNSPECIFIED TYPE: ICD-10-CM

## 2023-08-09 PROCEDURE — 94726 PLETHYSMOGRAPHY LUNG VOLUMES: CPT

## 2023-08-09 PROCEDURE — 94060 EVALUATION OF WHEEZING: CPT

## 2023-08-09 PROCEDURE — 94729 DIFFUSING CAPACITY: CPT

## 2023-08-09 PROCEDURE — 6360000002 HC RX W HCPCS

## 2023-08-09 RX ORDER — ALBUTEROL SULFATE 2.5 MG/3ML
SOLUTION RESPIRATORY (INHALATION)
Status: COMPLETED
Start: 2023-08-09 | End: 2023-08-09

## 2023-08-09 RX ADMIN — ALBUTEROL SULFATE 2.5 MG: 2.5 SOLUTION RESPIRATORY (INHALATION) at 11:09

## 2023-08-10 RX ORDER — LEVOTHYROXINE SODIUM 0.03 MG/1
TABLET ORAL
Qty: 30 TABLET | Refills: 10 | Status: SHIPPED | OUTPATIENT
Start: 2023-08-10

## 2023-08-10 NOTE — TELEPHONE ENCOUNTER
Comments: This request is coming from the pharmacy. Last Office Visit (last PCP visit):   7/10/2023    Next Visit Date:  Future Appointments   Date Time Provider [address]   8/14/2023  2:20 PM Kevin Kirk, [facility]   9/25/2023  1:30 PM Mayelin Campbell MD [address]   10/9/2023  2:30 PM Monie Chambers DPM MLOX OP POD [facility] [place]       If hasn't been seen in over a year OR hasn't followed up according to last diabetes/ADHD visit, make appointment for patient before sending refill to provider.     Rx requested:  Requested Prescriptions     Pending Prescriptions Disp Refills    levothyroxine (SYNTHROID) 25 MCG tablet [Pharmacy Med Name: LEVOTHYROXINE 25 MCG TAB 25 Tablet] 30 tablet 10     Sig: TAKE 1 TABLET BY MOUTH DAILY ON AN EMPTY STOMACH 30 MINUTES BEFORE OTHER MEDICATIONS AND FOOD    metFORMIN (GLUCOPHAGE) 500 MG tablet [Pharmacy Med Name: METFORMIN 500 MG TABS 500 Tablet] 30 tablet 10     Sig: TAKE 1/2 TABLET BY MOUTH WITH BREAKFAST AND TAKE 1/2 TABLET WITH DINNER

## 2023-08-14 ENCOUNTER — TELEPHONE (OUTPATIENT)
Dept: FAMILY MEDICINE CLINIC | Age: 43
End: 2023-08-14

## 2023-08-14 DIAGNOSIS — R20.0 NUMBNESS AND TINGLING IN BOTH HANDS: ICD-10-CM

## 2023-08-14 DIAGNOSIS — R20.2 NUMBNESS AND TINGLING IN BOTH HANDS: ICD-10-CM

## 2023-08-14 DIAGNOSIS — G56.03 BILATERAL CARPAL TUNNEL SYNDROME: Primary | ICD-10-CM

## 2023-08-14 NOTE — TELEPHONE ENCOUNTER
[facility] states they cannot fill the order for wrist splints with out Carpal Tunnel Syndrome being a diagnosis code.     Please advise    Callback [phone] ext [phone]    Fax [phone]

## 2023-08-21 ENCOUNTER — TELEPHONE (OUTPATIENT)
Dept: FAMILY MEDICINE CLINIC | Age: 43
End: 2023-08-21

## 2023-08-21 NOTE — TELEPHONE ENCOUNTER
Patient states that Lizette Garnica with [facility] needs a certificate of medical necessity faxed to [phone]. So she can get a hospital bed. Please Advise.

## 2023-08-29 ENCOUNTER — TELEPHONE (OUTPATIENT)
Dept: FAMILY MEDICINE CLINIC | Age: 43
End: 2023-08-29

## 2023-08-29 DIAGNOSIS — G89.29 CHRONIC MIDLINE LOW BACK PAIN WITHOUT SCIATICA: Primary | ICD-10-CM

## 2023-08-29 DIAGNOSIS — M54.50 CHRONIC MIDLINE LOW BACK PAIN WITHOUT SCIATICA: Primary | ICD-10-CM

## 2023-08-29 NOTE — TELEPHONE ENCOUNTER
----- Message from Marcel Cho sent at 8/29/2023  2:16 PM EDT -----  Subject: Referral Request    Reason for referral request? Referral for Therapy - Renewal (Time ran out   on previous referral)   Provider patient wants to be referred to(if known):     Provider Phone Number(if known):     Additional Information for Provider?   ---------------------------------------------------------------------------  --------------  [facility] [place]    [phone]; OK to leave message on voicemail  ---------------------------------------------------------------------------  --------------

## 2023-09-08 DIAGNOSIS — F43.0 ACUTE STRESS DISORDER: ICD-10-CM

## 2023-09-11 RX ORDER — BUSPIRONE HYDROCHLORIDE 7.5 MG/1
TABLET ORAL
Qty: 60 TABLET | Refills: 10 | Status: SHIPPED | OUTPATIENT
Start: 2023-09-11

## 2023-09-11 NOTE — TELEPHONE ENCOUNTER
Comments:     Last Office Visit (last PCP visit):   7/10/2023    Next Visit Date:  Future Appointments   Date Time Provider [address]   9/25/2023  1:30 PM Mayelin Campbell MD [facility]   10/9/2023  2:30 PM Monie Chambers DPM MLOX OP POD [facility] AT [place]       **If hasn't been seen in over a year OR hasn't followed up according to last diabetes/ADHD visit, make appointment for patient before sending refill to provider.     Rx requested:  Requested Prescriptions     Pending Prescriptions Disp Refills    busPIRone (BUSPAR) 7.5 MG tablet [Pharmacy Med Name: BUSPIRONE 7.5MG TABS 7.5 Tablet] 60 tablet 10     Sig: TAKE 1 TABLET BY MOUTH TWICE DAILY FOR ANXIETY

## 2023-09-25 ENCOUNTER — HOSPITAL ENCOUNTER (EMERGENCY)
Age: 43
Discharge: HOME | End: 2023-09-25
Payer: MEDICAID

## 2023-09-25 ENCOUNTER — OFFICE VISIT (OUTPATIENT)
Dept: PULMONOLOGY | Age: 43
End: 2023-09-25
Payer: COMMERCIAL

## 2023-09-25 VITALS
HEART RATE: 87 BPM | SYSTOLIC BLOOD PRESSURE: 120 MMHG | TEMPERATURE: 97.6 F | BODY MASS INDEX: 41.48 KG/M2 | WEIGHT: 249 LBS | DIASTOLIC BLOOD PRESSURE: 78 MMHG | OXYGEN SATURATION: 97 % | HEIGHT: 65 IN

## 2023-09-25 VITALS
WEIGHT: 250 LBS | DIASTOLIC BLOOD PRESSURE: 93 MMHG | BODY MASS INDEX: 41.65 KG/M2 | HEIGHT: 65 IN | TEMPERATURE: 98.3 F | HEART RATE: 74 BPM | OXYGEN SATURATION: 99 % | SYSTOLIC BLOOD PRESSURE: 162 MMHG | RESPIRATION RATE: 18 BRPM

## 2023-09-25 DIAGNOSIS — G47.10 HYPERSOMNIA: Primary | ICD-10-CM

## 2023-09-25 DIAGNOSIS — R06.02 SHORTNESS OF BREATH: ICD-10-CM

## 2023-09-25 DIAGNOSIS — G47.30 SLEEP DISORDER BREATHING: ICD-10-CM

## 2023-09-25 DIAGNOSIS — E66.9 OBESITY, UNSPECIFIED CLASSIFICATION, UNSPECIFIED OBESITY TYPE, UNSPECIFIED WHETHER SERIOUS COMORBIDITY PRESENT: ICD-10-CM

## 2023-09-25 DIAGNOSIS — L02.92 BOILS OF MULTIPLE SITES: Primary | ICD-10-CM

## 2023-09-25 PROCEDURE — 3078F DIAST BP <80 MM HG: CPT | Performed by: INTERNAL MEDICINE

## 2023-09-25 PROCEDURE — G8417 CALC BMI ABV UP PARAM F/U: HCPCS | Performed by: INTERNAL MEDICINE

## 2023-09-25 PROCEDURE — 3074F SYST BP LT 130 MM HG: CPT | Performed by: INTERNAL MEDICINE

## 2023-09-25 PROCEDURE — 99213 OFFICE O/P EST LOW 20 MIN: CPT | Performed by: INTERNAL MEDICINE

## 2023-09-25 PROCEDURE — 1036F TOBACCO NON-USER: CPT | Performed by: INTERNAL MEDICINE

## 2023-09-25 PROCEDURE — G8427 DOCREV CUR MEDS BY ELIG CLIN: HCPCS | Performed by: INTERNAL MEDICINE

## 2023-09-25 PROCEDURE — 99283 EMERGENCY DEPT VISIT LOW MDM: CPT

## 2023-09-25 RX ORDER — BACITRACIN ZINC AND POLYMYXIN B SULFATE 500; 1000 [USP'U]/G; [USP'U]/G
OINTMENT TOPICAL
Qty: 15 G | Refills: 0 | Status: SHIPPED | OUTPATIENT
Start: 2023-09-25 | End: 2023-10-02

## 2023-09-25 ASSESSMENT — ENCOUNTER SYMPTOMS
DIARRHEA: 0
ABDOMINAL PAIN: 0
SHORTNESS OF BREATH: 0
NAUSEA: 0
COUGH: 0
PHOTOPHOBIA: 0
VOMITING: 0

## 2023-09-25 ASSESSMENT — LIFESTYLE VARIABLES
HOW MANY STANDARD DRINKS CONTAINING ALCOHOL DO YOU HAVE ON A TYPICAL DAY: PATIENT DOES NOT DRINK
HOW OFTEN DO YOU HAVE A DRINK CONTAINING ALCOHOL: NEVER

## 2023-09-25 ASSESSMENT — PAIN - FUNCTIONAL ASSESSMENT: PAIN_FUNCTIONAL_ASSESSMENT: NONE - DENIES PAIN

## 2023-09-25 NOTE — PROGRESS NOTES
PATIENT VISIT-PULMONARY/SLEEP    9/25/2023     REFERRING PHYSICIAN:  Williams William PA-C     REASON FOR REFERRAL:  ALDO    HPI:     Summer Charles is a 44 y.o. female who was referred to sleep and pulmonary clinic for evaluation. Has been having issues with shortness of breath mainly at sleep associated with choking and gasping for air. She snores loudly and she has witnessed apneas. Of note she had to sleep study in 2019 and 2020 and both were negative for sleep apnea. 1 study was in the lab and 1 study was a home test.  She started and sleepy during the day and she dozes off easily. She has been like this for the last 15 years as far as she remembers. Her weight has been overall the same in the last few years. She also has shortness of breath with activities and wheezing. She has been prescribed albuterol rescue inhaler. She has some wheezing as well at nighttime. 9/25/23:    Underwent a pulmonary function test which showed normal spirometry and normal diffusion capacity. Total lung capacity was mildly reduced which is consistent with obesity. Continues to use rescue inhaler intermittently  Still sleepy and tired during the day and dozes off easily. Sleep study and MSLT scheduled for November.            Past Medical History   Past Medical History:   Diagnosis Date    Adenocarcinoma of uterus ([address]) 05/13/2022    Depression     Diabetes mellitus ([address])     meds since age 26s    Endometrial cancer ([address])     Hypertension     meds since 10/2021    Thyroid disease 2017    meds since age 36s       Past Surgical History  Past Surgical History:   Procedure Laterality Date    DILATION AND CURETTAGE OF UTERUS N/A 04/04/2022    WITH  HYSTEROSCOPY  DILATATION AND CURETTAGE performed by Vladimir Lazcano DO at [address] [facility] ([address])      KNEE SURGERY Right 2016    ligament repair    KNEE SURGERY Left     I & D due to infection at age 36s    [address]

## 2023-09-25 NOTE — ED PROVIDER NOTES
[facility] ED  EMERGENCY DEPARTMENT ENCOUNTER      Pt Name: Padmini Ybarra  MRN: [id number]  [address] [facility] [place] 1980  Date of evaluation: 9/25/2023  Provider: Lena Verduzco, [facility] [place]       Chief Complaint   Patient presents with    Blister     Patient states she has mult boils on neck and R breast. States they have been draining clear fluid. HISTORY OF PRESENT ILLNESS   (Location/Symptom, Timing/Onset, Context/Setting, Quality, Duration, Modifying Factors, Severity)  Note limiting factors. Padmini Ybarra is a 44 y.o. female who presents to the emergency department PMHx presents to the emergency department for evaluation of skin boils present to her right posterior shoulder as well as to her right lateral breast.  Patient states she noticed these started 1 to 2 days ago. States they have been draining clear fluid. Denies any purulence or bleeding. Denies any streaking or spreading rashes, denies warmth. Denies itching. Denies fever, chills, nausea, vomiting. Denies any recent travel. Denies any new medications or exposures. HPI    Nursing Notes were reviewed. REVIEW OF SYSTEMS    (2-9 systems for level 4, 10 or more for level 5)     Review of Systems   Constitutional:  Negative for chills and fever. HENT:  Negative for congestion. Eyes:  Negative for photophobia. Respiratory:  Negative for cough and shortness of breath. Cardiovascular:  Negative for chest pain. Gastrointestinal:  Negative for abdominal pain, diarrhea, nausea and vomiting. Genitourinary:  Negative for difficulty urinating. Musculoskeletal:  Negative for myalgias. Skin:         Open boils   Neurological:  Negative for headaches. Psychiatric/Behavioral:  Negative for confusion. Except as noted above the remainder of the review of systems was reviewed and negative.        PAST MEDICAL HISTORY     Past Medical History:   Diagnosis Date    Adenocarcinoma of uterus ([address])

## 2023-11-07 DIAGNOSIS — G47.00 INSOMNIA, UNSPECIFIED TYPE: ICD-10-CM

## 2023-11-07 DIAGNOSIS — E55.9 VITAMIN D DEFICIENCY: ICD-10-CM

## 2023-11-07 DIAGNOSIS — R53.82 CHRONIC FATIGUE: ICD-10-CM

## 2023-11-09 NOTE — TELEPHONE ENCOUNTER
Comments:     Last Office Visit (last PCP visit):   7/10/2023    Next Visit Date:  Future Appointments   Date Time Provider [address]   11/10/2023  8:30 PM Zbigniew Vazquezbrandon   11/27/2023  1:30 PM David Esparza, [address]   12/7/2023 10:00 AM Ky William PA-C [place] [facility]   1/8/2024  9:30 AM Andrey Haddad DPM [facility] OP POD [facility] EMERGENCY [facility] AT [place]       **If hasn't been seen in over a year OR hasn't followed up according to last diabetes/ADHD visit, make appointment for patient before sending refill to provider. Rx requested:  Requested Prescriptions     Pending Prescriptions Disp Refills    Cholecalciferol (VITAMIN D3) 125 MCG (5000 UT) CAPS [Pharmacy Med Name: VIT D-3 125MCG SGC(5000U) 125 MCG SGC] 30 capsule 10     Sig: TAKE 1 CAPSULE BY MOUTH DAILY    traZODone (DESYREL) 100 MG tablet [Pharmacy Med Name: TRAZODONE 100 MG TABS 100 Tablet] 30 tablet 10     Sig: TAKE 1 TABLET BY MOUTH EVERY NIGHT AT BEDTIME.

## 2023-11-10 RX ORDER — TRAZODONE HYDROCHLORIDE 100 MG/1
TABLET ORAL
Qty: 30 TABLET | Refills: 10 | Status: SHIPPED | OUTPATIENT
Start: 2023-11-10

## 2023-11-16 ENCOUNTER — HOSPITAL ENCOUNTER (OUTPATIENT)
Dept: SLEEP CENTER | Age: 43
Discharge: HOME | End: 2023-11-18
Payer: MEDICAID

## 2023-11-16 PROCEDURE — 95810 POLYSOM 6/> YRS 4/> PARAM: CPT

## 2023-11-16 PROCEDURE — 95805 MULTIPLE SLEEP LATENCY TEST: CPT

## 2023-11-27 ENCOUNTER — OFFICE VISIT (OUTPATIENT)
Dept: PULMONOLOGY | Age: 43
End: 2023-11-27
Payer: MEDICAID

## 2023-11-27 VITALS
SYSTOLIC BLOOD PRESSURE: 138 MMHG | HEART RATE: 74 BPM | OXYGEN SATURATION: 98 % | WEIGHT: 250 LBS | HEIGHT: 65 IN | TEMPERATURE: 97.6 F | BODY MASS INDEX: 41.65 KG/M2 | DIASTOLIC BLOOD PRESSURE: 76 MMHG

## 2023-11-27 DIAGNOSIS — G47.419 PRIMARY NARCOLEPSY WITHOUT CATAPLEXY: Primary | ICD-10-CM

## 2023-11-27 DIAGNOSIS — G47.10 HYPERSOMNIA: ICD-10-CM

## 2023-11-27 PROCEDURE — G8417 CALC BMI ABV UP PARAM F/U: HCPCS | Performed by: INTERNAL MEDICINE

## 2023-11-27 PROCEDURE — 99213 OFFICE O/P EST LOW 20 MIN: CPT | Performed by: INTERNAL MEDICINE

## 2023-11-27 PROCEDURE — 3075F SYST BP GE 130 - 139MM HG: CPT | Performed by: INTERNAL MEDICINE

## 2023-11-27 PROCEDURE — G8427 DOCREV CUR MEDS BY ELIG CLIN: HCPCS | Performed by: INTERNAL MEDICINE

## 2023-11-27 PROCEDURE — 1036F TOBACCO NON-USER: CPT | Performed by: INTERNAL MEDICINE

## 2023-11-27 PROCEDURE — 3078F DIAST BP <80 MM HG: CPT | Performed by: INTERNAL MEDICINE

## 2023-11-27 PROCEDURE — G8484 FLU IMMUNIZE NO ADMIN: HCPCS | Performed by: INTERNAL MEDICINE

## 2023-11-27 RX ORDER — MODAFINIL 100 MG/1
100 TABLET ORAL DAILY
Qty: 30 TABLET | Refills: 0 | Status: SHIPPED | OUTPATIENT
Start: 2023-11-27 | End: 2023-11-30 | Stop reason: SDUPTHER

## 2023-11-27 NOTE — PROGRESS NOTES
PATIENT VISIT-PULMONARY/SLEEP    11/27/2023     REFERRING PHYSICIAN:  Bandar William PA-C     REASON FOR REFERRAL:  ALDO    HPI:     Ruben Hall is a 44 y.o. female who was referred to sleep and pulmonary clinic for evaluation. Has been having issues with shortness of breath mainly at sleep associated with choking and gasping for air. She snores loudly and she has witnessed apneas. Of note she had to sleep study in 2019 and 2020 and both were negative for sleep apnea. 1 study was in the lab and 1 study was a home test.  She started and sleepy during the day and she dozes off easily. She has been like this for the last 15 years as far as she remembers. Her weight has been overall the same in the last few years. She also has shortness of breath with activities and wheezing. She has been prescribed albuterol rescue inhaler. She has some wheezing as well at nighttime. 9/25/23:    Underwent a pulmonary function test which showed normal spirometry and normal diffusion capacity. Total lung capacity was mildly reduced which is consistent with obesity. Continues to use rescue inhaler intermittently  Still sleepy and tired during the day and dozes off easily. Sleep study and MSLT scheduled for November. 11/27/23:      Has been to sleep study and followed by MSLT. Sleep study was of good quality. Sleep efficiency was 86%. Respiratory events were normal.  There was sleep-related hypoxia. She underwent a MSLT which showed severe hypersomnia. Mean sleep latency was less than 2 minutes. Continues to be sleepy and tired during the day and dozes off easily.                        Past Medical History   Past Medical History:   Diagnosis Date    Adenocarcinoma of uterus ([address]) 05/13/2022    Depression     Diabetes mellitus ([address])     meds since age 26s    Endometrial cancer ([address])     Hypertension     meds since 10/2021    Thyroid disease 2017    meds since age 36s       Past Surgical

## 2023-11-30 DIAGNOSIS — G47.10 HYPERSOMNIA: ICD-10-CM

## 2023-11-30 DIAGNOSIS — G47.419 PRIMARY NARCOLEPSY WITHOUT CATAPLEXY: ICD-10-CM

## 2023-11-30 RX ORDER — MODAFINIL 100 MG/1
100 TABLET ORAL DAILY
Qty: 30 TABLET | Refills: 0 | Status: SHIPPED | OUTPATIENT
Start: 2023-11-30 | End: 2023-12-30

## 2023-11-30 NOTE — TELEPHONE ENCOUNTER
PT STATES [facility] NEVER RECEIVED RX. PLEASE RESEND. Rx requested:  Requested Prescriptions     Pending Prescriptions Disp Refills    modafinil (PROVIGIL) 100 MG tablet 30 tablet 0     Sig: Take 1 tablet by mouth daily for 30 days.  Max Daily Amount: 100 mg       Last Office Visit:   11/27/2023      Next Visit Date:  Future Appointments   Date Time Provider [address]   12/7/2023 10:00 AM Joanne William [place] [facility]   1/2/2024 11:30 AM Mandy Schneider [facility]   1/8/2024  9:30 AM Magalis Chavez DPM [facility] OP POD [facility] [place]

## 2023-12-07 ENCOUNTER — OFFICE VISIT (OUTPATIENT)
Dept: FAMILY MEDICINE CLINIC | Age: 43
End: 2023-12-07
Payer: MEDICAID

## 2023-12-07 VITALS
RESPIRATION RATE: 18 BRPM | DIASTOLIC BLOOD PRESSURE: 88 MMHG | SYSTOLIC BLOOD PRESSURE: 138 MMHG | BODY MASS INDEX: 41.15 KG/M2 | HEIGHT: 65 IN | HEART RATE: 88 BPM | OXYGEN SATURATION: 98 % | WEIGHT: 247 LBS

## 2023-12-07 DIAGNOSIS — E66.01 CLASS 3 SEVERE OBESITY DUE TO EXCESS CALORIES WITHOUT SERIOUS COMORBIDITY WITH BODY MASS INDEX (BMI) OF 40.0 TO 44.9 IN ADULT (HCC): Primary | ICD-10-CM

## 2023-12-07 DIAGNOSIS — R73.03 PRE-DIABETES: ICD-10-CM

## 2023-12-07 DIAGNOSIS — M54.50 CHRONIC MIDLINE LOW BACK PAIN WITHOUT SCIATICA: ICD-10-CM

## 2023-12-07 DIAGNOSIS — G89.29 CHRONIC MIDLINE LOW BACK PAIN WITHOUT SCIATICA: ICD-10-CM

## 2023-12-07 DIAGNOSIS — E88.819 INSULIN RESISTANCE: ICD-10-CM

## 2023-12-07 DIAGNOSIS — C55 ADENOCARCINOMA OF UTERUS (HCC): ICD-10-CM

## 2023-12-07 DIAGNOSIS — G47.34 NOCTURNAL HYPOXIA: ICD-10-CM

## 2023-12-07 PROBLEM — R23.4 FISSURE IN SKIN OF FOOT: Status: RESOLVED | Noted: 2023-07-10 | Resolved: 2023-12-07

## 2023-12-07 PROBLEM — R30.0 DYSURIA: Status: RESOLVED | Noted: 2022-11-29 | Resolved: 2023-12-07

## 2023-12-07 PROCEDURE — 1036F TOBACCO NON-USER: CPT | Performed by: PHYSICIAN ASSISTANT

## 2023-12-07 PROCEDURE — G8427 DOCREV CUR MEDS BY ELIG CLIN: HCPCS | Performed by: PHYSICIAN ASSISTANT

## 2023-12-07 PROCEDURE — 3079F DIAST BP 80-89 MM HG: CPT | Performed by: PHYSICIAN ASSISTANT

## 2023-12-07 PROCEDURE — 3075F SYST BP GE 130 - 139MM HG: CPT | Performed by: PHYSICIAN ASSISTANT

## 2023-12-07 PROCEDURE — 99214 OFFICE O/P EST MOD 30 MIN: CPT | Performed by: PHYSICIAN ASSISTANT

## 2023-12-07 PROCEDURE — G8484 FLU IMMUNIZE NO ADMIN: HCPCS | Performed by: PHYSICIAN ASSISTANT

## 2023-12-07 PROCEDURE — G8417 CALC BMI ABV UP PARAM F/U: HCPCS | Performed by: PHYSICIAN ASSISTANT

## 2023-12-07 RX ORDER — DULAGLUTIDE 0.75 MG/.5ML
0.75 INJECTION, SOLUTION SUBCUTANEOUS WEEKLY
Qty: 4 ADJUSTABLE DOSE PRE-FILLED PEN SYRINGE | Refills: 5 | Status: SHIPPED | OUTPATIENT
Start: 2023-12-07

## 2023-12-07 RX ORDER — PHENTERMINE HYDROCHLORIDE 37.5 MG/1
37.5 TABLET ORAL
Qty: 30 TABLET | Refills: 2 | Status: SHIPPED | OUTPATIENT
Start: 2023-12-07 | End: 2024-03-06

## 2023-12-07 ASSESSMENT — ENCOUNTER SYMPTOMS
WHEEZING: 0
CHEST TIGHTNESS: 0
SHORTNESS OF BREATH: 0
DIARRHEA: 0
NAUSEA: 0
ABDOMINAL DISTENTION: 0
VOMITING: 0
COUGH: 0
ABDOMINAL PAIN: 0
BACK PAIN: 1
TROUBLE SWALLOWING: 0

## 2023-12-07 NOTE — PROGRESS NOTES
Adult   Pulse: 88   Resp: 18   SpO2: 98%   Weight: 112 kg (247 lb)   Height: 1.651 m (5' 5\")     BP Readings from Last 3 Encounters:   12/07/23 138/88   11/27/23 138/76   09/25/23 (!) 162/93     Wt Readings from Last 3 Encounters:   12/07/23 112 kg (247 lb)   11/27/23 113.4 kg (250 lb)   09/25/23 113.4 kg (250 lb)     Physical Exam  Vitals reviewed. Constitutional:       Appearance: She is obese. HENT:      Head: Normocephalic and atraumatic. Right Ear: External ear normal.      Left Ear: External ear normal.      Nose: Nose normal.      Mouth/Throat:      Mouth: Mucous membranes are moist.   Cardiovascular:      Rate and Rhythm: Normal rate and regular rhythm. Pulmonary:      Effort: Pulmonary effort is normal.      Breath sounds: Normal breath sounds. Musculoskeletal:      Right lower leg: Edema present. Left lower leg: Edema present. Comments: Trace pedal edeam of LEs noted on exam   Skin:     General: Skin is warm. Neurological:      General: No focal deficit present. Mental Status: She is alert and oriented to person, place, and time. Assessment & Plan    Diagnosis Orders   1. Class 3 severe obesity due to excess calories without serious comorbidity with body mass index (BMI) of 40.0 to 44.9 in adult (HCC)  phentermine (ADIPEX-P) 37.5 MG tablet      2. Pre-diabetes  Dulaglutide (TRULICITY) 4.87 DR/8.5GU SOPN      3. Nocturnal hypoxia        4. Insulin resistance        5. Adenocarcinoma of uterus ([address])        6. Chronic midline low back pain without sciatica        Discussed complaints with patient. Weight loss focus at this time, we did discuss bariatric surgery to consider. Trial adipex, start Trulicity. HOLD/stop metformin at this time. 4 week ov with me. No orders of the defined types were placed in this encounter.     Orders Placed This Encounter   Medications    Dulaglutide (TRULICITY) 1.03 DH/3.5VG SOPN     Sig: Inject 0.75 mg into the skin once a week

## 2023-12-13 ENCOUNTER — TELEPHONE (OUTPATIENT)
Dept: PULMONOLOGY | Age: 43
End: 2023-12-13

## 2023-12-13 NOTE — TELEPHONE ENCOUNTER
PT CALLED IN TO THE OFFICE BECAUSE [facility] NEEDS A PA ON HER MODAFINIL. THIS WAS REQUESTED 11/30/23 WAS THE PA DONE?          PLEASE ADVISE

## 2023-12-13 NOTE — TELEPHONE ENCOUNTER
Spoke with pharmacy, they said was a hold up with insurance and they have that cleared up and it is in route to patient

## 2023-12-21 ENCOUNTER — TELEPHONE (OUTPATIENT)
Dept: FAMILY MEDICINE CLINIC | Age: 43
End: 2023-12-21

## 2023-12-21 NOTE — TELEPHONE ENCOUNTER
Patient is concerned about taking the Trulicity because her sugars are always low.      Please advise    Callback [phone]

## 2023-12-26 PROBLEM — G47.30 SLEEP-DISORDERED BREATHING: Status: ACTIVE | Noted: 2023-12-26

## 2024-01-03 DIAGNOSIS — G47.10 HYPERSOMNIA: ICD-10-CM

## 2024-01-03 DIAGNOSIS — G47.419 PRIMARY NARCOLEPSY WITHOUT CATAPLEXY: ICD-10-CM

## 2024-01-04 NOTE — TELEPHONE ENCOUNTER
Rx requested:  Requested Prescriptions     Pending Prescriptions Disp Refills    modafinil (PROVIGIL) 100 MG tablet [Pharmacy Med Name: MODAFINIL 100 MG TABS 100 Tablet] 30 tablet 0     Sig: TAKE 1 TABLET BY MOUTH ONCE DAILY *MAX DAILY DOSE: 100 MG*       Last Office Visit:   11/27/2023      Next Visit Date:  Future Appointments   Date Time Provider Department Center   1/8/2024  9:30 AM Charly Escudero DPM [facility]   2/23/2024 10:00 AM Jocelyn William PA-C [place]  [facility]

## 2024-01-05 RX ORDER — MODAFINIL 100 MG/1
TABLET ORAL
Qty: 30 TABLET | Refills: 0 | Status: SHIPPED | OUTPATIENT
Start: 2024-01-05 | End: 2024-02-04

## 2024-01-29 DIAGNOSIS — G47.419 PRIMARY NARCOLEPSY WITHOUT CATAPLEXY: ICD-10-CM

## 2024-01-29 DIAGNOSIS — G47.10 HYPERSOMNIA: ICD-10-CM

## 2024-01-31 RX ORDER — MODAFINIL 100 MG/1
TABLET ORAL
Qty: 30 TABLET | Refills: 0 | OUTPATIENT
Start: 2024-01-31

## 2024-02-07 DIAGNOSIS — R06.02 SOB (SHORTNESS OF BREATH) ON EXERTION: ICD-10-CM

## 2024-02-08 RX ORDER — ALBUTEROL SULFATE 90 UG/1
AEROSOL, METERED RESPIRATORY (INHALATION)
Qty: 8.5 G | Refills: 10 | Status: SHIPPED | OUTPATIENT
Start: 2024-02-08

## 2024-02-10 ENCOUNTER — HOSPITAL ENCOUNTER (EMERGENCY)
Age: 44
Discharge: HOME | End: 2024-02-10
Attending: EMERGENCY MEDICINE
Payer: COMMERCIAL

## 2024-02-10 ENCOUNTER — APPOINTMENT (OUTPATIENT)
Dept: GENERAL RADIOLOGY | Age: 44
End: 2024-02-10
Payer: COMMERCIAL

## 2024-02-10 VITALS
TEMPERATURE: 98.6 F | HEART RATE: 88 BPM | RESPIRATION RATE: 15 BRPM | WEIGHT: 253 LBS | BODY MASS INDEX: 42.1 KG/M2 | SYSTOLIC BLOOD PRESSURE: 152 MMHG | DIASTOLIC BLOOD PRESSURE: 76 MMHG | OXYGEN SATURATION: 97 %

## 2024-02-10 DIAGNOSIS — S46.919A STRAIN OF SHOULDER, UNSPECIFIED LATERALITY, INITIAL ENCOUNTER: Primary | ICD-10-CM

## 2024-02-10 PROCEDURE — 99283 EMERGENCY DEPT VISIT LOW MDM: CPT

## 2024-02-10 PROCEDURE — 73030 X-RAY EXAM OF SHOULDER: CPT

## 2024-02-10 PROCEDURE — 6370000000 HC RX 637 (ALT 250 FOR IP): Performed by: EMERGENCY MEDICINE

## 2024-02-10 RX ORDER — OXYCODONE HYDROCHLORIDE AND ACETAMINOPHEN 5; 325 MG/1; MG/1
1 TABLET ORAL ONCE
Status: COMPLETED | OUTPATIENT
Start: 2024-02-10 | End: 2024-02-10

## 2024-02-10 RX ORDER — ACETAMINOPHEN 500 MG
500 TABLET ORAL EVERY 6 HOURS PRN
Qty: 30 TABLET | Refills: 0 | Status: SHIPPED | OUTPATIENT
Start: 2024-02-10

## 2024-02-10 RX ORDER — LIDOCAINE 50 MG/G
1 PATCH TOPICAL DAILY
Qty: 30 PATCH | Refills: 0 | Status: SHIPPED | OUTPATIENT
Start: 2024-02-10

## 2024-02-10 RX ORDER — KETOROLAC TROMETHAMINE 10 MG/1
10 TABLET, FILM COATED ORAL EVERY 6 HOURS PRN
Qty: 20 TABLET | Refills: 0 | Status: SHIPPED | OUTPATIENT
Start: 2024-02-10

## 2024-02-10 RX ORDER — TIZANIDINE 4 MG/1
4 TABLET ORAL EVERY 6 HOURS PRN
Qty: 15 TABLET | Refills: 0 | Status: SHIPPED | OUTPATIENT
Start: 2024-02-10

## 2024-02-10 RX ADMIN — OXYCODONE HYDROCHLORIDE AND ACETAMINOPHEN 1 TABLET: 5; 325 TABLET ORAL at 03:25

## 2024-02-10 ASSESSMENT — PAIN DESCRIPTION - DESCRIPTORS: DESCRIPTORS: ACHING

## 2024-02-10 ASSESSMENT — PAIN DESCRIPTION - ORIENTATION: ORIENTATION: RIGHT

## 2024-02-10 ASSESSMENT — ENCOUNTER SYMPTOMS: SHORTNESS OF BREATH: 0

## 2024-02-10 ASSESSMENT — PAIN DESCRIPTION - LOCATION: LOCATION: SHOULDER

## 2024-02-10 ASSESSMENT — PAIN SCALES - GENERAL: PAINLEVEL_OUTOF10: 10

## 2024-02-10 ASSESSMENT — PAIN - FUNCTIONAL ASSESSMENT: PAIN_FUNCTIONAL_ASSESSMENT: 0-10

## 2024-02-10 NOTE — ED TRIAGE NOTES
Pt to ER today c/o RT shoulder pain x1 week.   Pt reported she was trying to  a table by herself  Pt reported she felt like she pulled something in her shoulder.   Denied numbness and tingling. Denied head neck or back pain

## 2024-02-10 NOTE — ED PROVIDER NOTES
[facility] ED  EMERGENCY DEPARTMENT ENCOUNTER      Pt Name: Gabbie Casiano  MRN: [id number]  Birthdate 1980  Date of evaluation: 2/10/2024  Provider: Ruben Higgins MD  3:54 AM    CHIEF COMPLAINT       Chief Complaint   Patient presents with    Shoulder Injury         HISTORY OF PRESENT ILLNESS    Gabbie Casiano is a 43 y.o. female who presents to the emergency department via private vehicle for evaluation of shoulder pain.  She said a week ago she lifted up a table.  That she has pain in the right AC joint and it is reproducible, worsens with palpation, that I will go down her right lateral upper arm/shoulder with range of motion.  She did not take any medication for relief tonight.  Denies previous surgery to the area.  No fever, neck or jaw pain, chest pain or difficulty breathing, nausea or vomiting, diaphoresis, numbness or weakness.    HPI  Chart review notes history of depression, diabetes, endometrial cancer, hypertension, thyroid disease  Nursing Notes were reviewed.    REVIEW OF SYSTEMS       Review of Systems   Constitutional:  Negative for fever.   Respiratory:  Negative for shortness of breath.    Cardiovascular:  Negative for chest pain.   Genitourinary:  Negative for flank pain.   Musculoskeletal:         Right shoulder pain   Neurological:  Negative for weakness and numbness.   All other systems reviewed and are negative.      Except as noted above the remainder of the review of systems was reviewed and negative.       PAST MEDICAL HISTORY     Past Medical History:   Diagnosis Date    Adenocarcinoma of uterus (HCC) 05/13/2022    Depression     Diabetes mellitus (HCC)     meds since age 20s    Endometrial cancer (HCC)     Hypertension     meds since 10/2021    Thyroid disease 2017    meds since age 31s         SURGICAL HISTORY       Past Surgical History:   Procedure Laterality Date    DILATION AND CURETTAGE OF UTERUS N/A 04/04/2022    WITH  HYSTEROSCOPY  DILATATION AND CURETTAGE  Response: Obeys commands  Stacy Coma Scale Score: 15                     [facility] Assessment  BP: (!) 152/76  Pulse: 88                 PHYSICAL EXAM       ED Triage Vitals [02/10/24 0147]   BP Temp Temp Source Pulse Respirations SpO2 Height Weight - Scale   (!) 162/73 98.6 °F (37 °C) Oral 78 20 97 % -- 114.8 kg (253 lb)       Physical Exam  Vitals reviewed.   Constitutional:       Appearance: She is not toxic-appearing or diaphoretic.   HENT:      Head: Normocephalic and atraumatic.      Nose: Nose normal.      Mouth/Throat:      Mouth: Mucous membranes are moist.   Eyes:      General: No scleral icterus.     Extraocular Movements: Extraocular movements intact.      Pupils: Pupils are equal, round, and reactive to light.   Cardiovascular:      Rate and Rhythm: Normal rate and regular rhythm.      Pulses: Normal pulses.   Pulmonary:      Effort: Pulmonary effort is normal. No respiratory distress.      Breath sounds: Normal breath sounds. No wheezing.   Abdominal:      General: There is no distension.      Tenderness: There is no abdominal tenderness.   Musculoskeletal:         General: No deformity.      Cervical back: No rigidity or tenderness.      Comments: Tenderness to the right proximal lateral shoulder.  No gross deformity.  No overlying erythema/warmth/induration or fluctuance.  Full range of motion passively and actively.  Radial pulse intact.  Good  strength, sensation intact, capillary refill within normal limits.  Compartments soft.  Neurovascular intact right upper extremity.   Skin:     Capillary Refill: Capillary refill takes less than 2 seconds.   Neurological:      General: No focal deficit present.      Mental Status: She is alert.      Sensory: No sensory deficit.      Motor: No weakness.         DIAGNOSTIC RESULTS     EKG: All EKG's are interpreted by the Emergency Department Physician who either signs or Co-signs this chart in the absence of a cardiologist.        RADIOLOGY:   Non-plain film

## 2024-02-10 NOTE — ED PROVIDER NOTES
Basic Information   Time Seen: 1:52 AM   Primary Care Provider: Jocelyn William PA-C     Chief Complaint   Patient presents with    Shoulder Injury      HPI   Gabbie Casiano is a 43 yrs female who presents with right shoulder pain after lifting a table approximately 1 week ago.  Patient describes shoulder pain that radiates down the arm.  She denies any falls or blunt trauma.  No chest pain or shortness of breath   Physical Exam     BP (!) 162/73 (02/10/24 0147)    Temp 98.6 °F (37 °C) (02/10/24 0147)    Pulse 78 (02/10/24 0147)   Resp 20 (02/10/24 0147)    SpO2 97 % (02/10/24 0147)       General: Awake and Alert, no acute distress   CV: RRR, S1, S2   Resp: LCTAB, even and non labored   Other: Tenderness to anterior shoulder   Impression and Plan   Labs Reviewed - No data to display     XR SHOULDER RIGHT (MIN 2 VIEWS)    (Results Pending)      Final Impression   I have performed a medical screening exam on Gabbie Casiano. Based on this patient's chief complaint/symptoms of   Chief Complaint   Patient presents with    Shoulder Injury    and my focused exam, their care will be started and transitioned to provider when room is available        Jesus Mcbride PA-C  02/10/24 0152

## 2024-02-12 DIAGNOSIS — R73.03 PRE-DIABETES: ICD-10-CM

## 2024-02-19 RX ORDER — LANCETS 28 GAUGE
EACH MISCELLANEOUS
Qty: 100 EACH | Refills: 2 | Status: SHIPPED | OUTPATIENT
Start: 2024-02-19

## 2024-02-19 NOTE — TELEPHONE ENCOUNTER
Comments:     Last Office Visit (last PCP visit):   12/7/2023    Next Visit Date:  Future Appointments   Date Time Provider Department Center   2/23/2024 10:00 AM Jocelyn William PA-C [place] [facility]       **If hasn't been seen in over a year OR hasn't followed up according to last diabetes/ADHD visit, make appointment for patient before sending refill to provider.    Rx requested:  Requested Prescriptions     Pending Prescriptions Disp Refills    FreeStyle Lancets MISC [Pharmacy Med Name: LANCETS FREESTYLE 100'S 28G] 100 each 2     Sig: USE AS DIRECTED TO TEST BLOOD SUGAR DAILY

## 2024-02-20 DIAGNOSIS — G47.10 HYPERSOMNIA: ICD-10-CM

## 2024-02-20 DIAGNOSIS — G47.419 PRIMARY NARCOLEPSY WITHOUT CATAPLEXY: ICD-10-CM

## 2024-02-22 RX ORDER — MODAFINIL 100 MG/1
TABLET ORAL
Qty: 30 TABLET | Refills: 0 | OUTPATIENT
Start: 2024-02-22

## 2024-03-08 DIAGNOSIS — R73.03 PRE-DIABETES: ICD-10-CM

## 2024-03-11 RX ORDER — ISOPROPYL ALCOHOL 70 ML/100ML
SWAB TOPICAL
Qty: 200 EACH | Refills: 5 | Status: SHIPPED | OUTPATIENT
Start: 2024-03-11

## 2024-03-11 NOTE — TELEPHONE ENCOUNTER
Comments: Pt would like a refill of her alcohol swabs. Please advise     Last Office Visit (last PCP visit):   12/7/2023    Next Visit Date:  Future Appointments   Date Time Provider Department Center   5/7/2024  2:30 PM Jocelyn William PA-C [place]  [facility]       **If hasn't been seen in over a year OR hasn't followed up according to last diabetes/ADHD visit, make appointment for patient before sending refill to provider.    Rx requested:  Requested Prescriptions     Pending Prescriptions Disp Refills    Alcohol Swabs (EASY TOUCH ALCOHOL PREP MEDIUM) 70 % PADS [Pharmacy Med Name: ALCOHOL PREP PADS 100CT PAD]  10     Sig: USE AS DIRECTED

## 2024-04-02 ENCOUNTER — HOSPITAL ENCOUNTER (OUTPATIENT)
Dept: WOMENS IMAGING | Age: 44
Discharge: HOME | End: 2024-04-04
Payer: COMMERCIAL

## 2024-04-02 DIAGNOSIS — Z12.31 ENCOUNTER FOR SCREENING MAMMOGRAM FOR MALIGNANT NEOPLASM OF BREAST: ICD-10-CM

## 2024-04-02 PROCEDURE — 77063 BREAST TOMOSYNTHESIS BI: CPT

## 2024-05-06 DIAGNOSIS — R73.03 PRE-DIABETES: ICD-10-CM

## 2024-05-07 ENCOUNTER — OFFICE VISIT (OUTPATIENT)
Dept: FAMILY MEDICINE CLINIC | Age: 44
End: 2024-05-07
Payer: COMMERCIAL

## 2024-05-07 VITALS
RESPIRATION RATE: 16 BRPM | DIASTOLIC BLOOD PRESSURE: 78 MMHG | SYSTOLIC BLOOD PRESSURE: 120 MMHG | HEIGHT: 65 IN | HEART RATE: 78 BPM | BODY MASS INDEX: 42.2 KG/M2 | HEART RATE: 78 BPM | RESPIRATION RATE: 16 BRPM | HEIGHT: 65 IN | WEIGHT: 253 LBS | SYSTOLIC BLOOD PRESSURE: 120 MMHG | WEIGHT: 253.31 LBS | OXYGEN SATURATION: 98 % | OXYGEN SATURATION: 98 % | DIASTOLIC BLOOD PRESSURE: 78 MMHG | BODY MASS INDEX: 42.15 KG/M2

## 2024-05-07 DIAGNOSIS — E88.819 INSULIN RESISTANCE: ICD-10-CM

## 2024-05-07 DIAGNOSIS — E55.9 VITAMIN D DEFICIENCY: ICD-10-CM

## 2024-05-07 DIAGNOSIS — G47.30 SLEEP-DISORDERED BREATHING: ICD-10-CM

## 2024-05-07 DIAGNOSIS — R73.03 PRE-DIABETES: ICD-10-CM

## 2024-05-07 DIAGNOSIS — Z13.220 LIPID SCREENING: ICD-10-CM

## 2024-05-07 DIAGNOSIS — E66.01 CLASS 3 SEVERE OBESITY DUE TO EXCESS CALORIES WITHOUT SERIOUS COMORBIDITY WITH BODY MASS INDEX (BMI) OF 40.0 TO 44.9 IN ADULT (HCC): Primary | ICD-10-CM

## 2024-05-07 DIAGNOSIS — I10 HYPERTENSION, UNSPECIFIED TYPE: ICD-10-CM

## 2024-05-07 DIAGNOSIS — Z00.00 INITIAL MEDICARE ANNUAL WELLNESS VISIT: Primary | ICD-10-CM

## 2024-05-07 DIAGNOSIS — E03.9 HYPOTHYROIDISM, UNSPECIFIED TYPE: ICD-10-CM

## 2024-05-07 PROBLEM — C55 ADENOCARCINOMA OF UTERUS (HCC): Status: RESOLVED | Noted: 2022-05-13 | Resolved: 2024-05-07

## 2024-05-07 PROCEDURE — G8417 CALC BMI ABV UP PARAM F/U: HCPCS | Performed by: PHYSICIAN ASSISTANT

## 2024-05-07 PROCEDURE — 1036F TOBACCO NON-USER: CPT | Performed by: PHYSICIAN ASSISTANT

## 2024-05-07 PROCEDURE — G0438 PPPS, INITIAL VISIT: HCPCS | Performed by: PHYSICIAN ASSISTANT

## 2024-05-07 PROCEDURE — G8427 DOCREV CUR MEDS BY ELIG CLIN: HCPCS | Performed by: PHYSICIAN ASSISTANT

## 2024-05-07 PROCEDURE — 3078F DIAST BP <80 MM HG: CPT | Performed by: PHYSICIAN ASSISTANT

## 2024-05-07 PROCEDURE — 99214 OFFICE O/P EST MOD 30 MIN: CPT | Performed by: PHYSICIAN ASSISTANT

## 2024-05-07 PROCEDURE — 3074F SYST BP LT 130 MM HG: CPT | Performed by: PHYSICIAN ASSISTANT

## 2024-05-07 RX ORDER — PHENTERMINE HYDROCHLORIDE 37.5 MG/1
37.5 TABLET ORAL
Qty: 30 TABLET | Refills: 2 | Status: SHIPPED | OUTPATIENT
Start: 2024-05-07 | End: 2024-08-05

## 2024-05-07 SDOH — ECONOMIC STABILITY: FOOD INSECURITY: WITHIN THE PAST 12 MONTHS, THE FOOD YOU BOUGHT JUST DIDN'T LAST AND YOU DIDN'T HAVE MONEY TO GET MORE.: NEVER TRUE

## 2024-05-07 SDOH — ECONOMIC STABILITY: INCOME INSECURITY: HOW HARD IS IT FOR YOU TO PAY FOR THE VERY BASICS LIKE FOOD, HOUSING, MEDICAL CARE, AND HEATING?: NOT HARD AT ALL

## 2024-05-07 SDOH — ECONOMIC STABILITY: FOOD INSECURITY: WITHIN THE PAST 12 MONTHS, YOU WORRIED THAT YOUR FOOD WOULD RUN OUT BEFORE YOU GOT MONEY TO BUY MORE.: NEVER TRUE

## 2024-05-07 ASSESSMENT — ENCOUNTER SYMPTOMS
TROUBLE SWALLOWING: 0
CHEST TIGHTNESS: 0
WHEEZING: 0
VOMITING: 0
DIARRHEA: 0
SHORTNESS OF BREATH: 0
BACK PAIN: 1
NAUSEA: 0
ABDOMINAL PAIN: 0
ABDOMINAL DISTENTION: 0
COUGH: 0

## 2024-05-07 ASSESSMENT — PATIENT HEALTH QUESTIONNAIRE - PHQ9
SUM OF ALL RESPONSES TO PHQ QUESTIONS 1-9: 0
SUM OF ALL RESPONSES TO PHQ9 QUESTIONS 1 & 2: 0
1. LITTLE INTEREST OR PLEASURE IN DOING THINGS: NOT AT ALL
2. FEELING DOWN, DEPRESSED OR HOPELESS: NOT AT ALL
SUM OF ALL RESPONSES TO PHQ QUESTIONS 1-9: 0

## 2024-05-07 ASSESSMENT — LIFESTYLE VARIABLES
HOW OFTEN DO YOU HAVE A DRINK CONTAINING ALCOHOL: NEVER
HOW MANY STANDARD DRINKS CONTAINING ALCOHOL DO YOU HAVE ON A TYPICAL DAY: PATIENT DOES NOT DRINK

## 2024-05-07 NOTE — PROGRESS NOTES
Medicare Annual Wellness Visit    Gabbie Casiano is here for Medicare AWV    Assessment & Plan   Initial Medicare annual wellness visit  Recommendations for Preventive Services Due: see orders and patient instructions/AVS.  Recommended screening schedule for the next 5-10 years is provided to the patient in written form: see Patient Instructions/AVS.     No follow-ups on file.     Subjective       Patient's complete Health Risk Assessment and screening values have been reviewed and are found in Flowsheets. The following problems were reviewed today and where indicated follow up appointments were made and/or referrals ordered.    Positive Risk Factor Screenings with Interventions:                Activity, Diet, and Weight:  On average, how many days per week do you engage in moderate to strenuous exercise (like a brisk walk)?: 3 days  On average, how many minutes do you engage in exercise at this level?: 60 min    Do you eat balanced/healthy meals regularly?: Yes    Body mass index is 42.1 kg/m². (!) Abnormal    Obesity Interventions:  Patient declines any further evaluation or treatment            Dentist Screen:  Have you seen the dentist within the past year?: (!) No    Intervention:  Advised to schedule with their dentist        Advanced Directives:  Do you have a Living Will?: (!) No    Intervention:  Low risk.                      Objective   Vitals:    05/07/24 1439   BP: 120/78   Site: Right Upper Arm   Position: Sitting   Cuff Size: Large Adult   Pulse: 78   Resp: 16   SpO2: 98%   Weight: 114.8 kg (253 lb)   Height: 1.651 m (5' 5\")      Body mass index is 42.1 kg/m².               Allergies   Allergen Reactions    Indomethacin Other (See Comments)     Prior to Visit Medications    Medication Sig Taking? Authorizing Provider   Alcohol Swabs (EASY TOUCH ALCOHOL PREP MEDIUM) 70 % PADS USE AS DIRECTED Yes Jocelyn William PA-C   FreeStyle Lancets MISC USE AS DIRECTED TO TEST BLOOD SUGAR DAILY Yes Jocelyn William

## 2024-05-07 NOTE — PROGRESS NOTES
Encounter   Procedures    CBC     Standing Status:   Future     Standing Expiration Date:   5/7/2025    Comprehensive Metabolic Panel     Standing Status:   Future     Standing Expiration Date:   5/7/2025    TSH     Standing Status:   Future     Standing Expiration Date:   5/7/2025    T4, Free     Standing Status:   Future     Standing Expiration Date:   5/7/2025    Insulin Free & Total     Standing Status:   Future     Standing Expiration Date:   5/7/2025    Hemoglobin A1C     Standing Status:   Future     Standing Expiration Date:   5/7/2025    Lipid, Fasting     Standing Status:   Future     Standing Expiration Date:   5/7/2025    Thyroid Peroxidase Antibody     Standing Status:   Future     Standing Expiration Date:   5/7/2025    Thyroid Stimulating Immunoglobulin     Standing Status:   Future     Standing Expiration Date:   5/7/2025    Vitamin D 25 Hydroxy     Standing Status:   Future     Standing Expiration Date:   5/7/2025     Orders Placed This Encounter   Medications    phentermine (ADIPEX-P) 37.5 MG tablet     Sig: Take 1 tablet by mouth every morning (before breakfast) for 90 days. Max Daily Amount: 37.5 mg     Dispense:  30 tablet     Refill:  2     Medications Discontinued During This Encounter   Medication Reason    ketorolac (TORADOL) 10 MG tablet LIST CLEANUP    lidocaine (LIDODERM) 5 % LIST CLEANUP    ondansetron (ZOFRAN-ODT) 4 MG disintegrating tablet LIST CLEANUP    tiZANidine (ZANAFLEX) 4 MG tablet LIST CLEANUP    Dulaglutide (TRULICITY) 0.75 MG/0.5ML SOPN Side effects     Return in about 6 weeks (around 6/18/2024) for follow up in office.      Reviewed with the patient: current clinical status, medications, activities and diet.     Side effects, adverse effects of the medication prescribed today, as well as treatment plan/ rationale and result expectations have been discussed with the patient who expresses understanding and desires to proceed.    Close follow up to evaluate treatment results

## 2024-05-09 RX ORDER — DULAGLUTIDE 0.75 MG/.5ML
INJECTION, SOLUTION SUBCUTANEOUS
Qty: 2 ML | Refills: 10 | Status: SHIPPED | OUTPATIENT
Start: 2024-05-09

## 2024-05-09 NOTE — TELEPHONE ENCOUNTER
Future Appointments    Encounter Information   Provider Department Appt Notes   11/7/2024 Jocelyn William PA-C [facility] Return in about 6 weeks (around 6/18/2024) for follow up in office.     Past Visits    Date Provider Specialty Visit Type Primary Dx   05/07/2024 Jocelyn William PA-C Family Medicine Office Visit Initial Medicare annual wellness visit   05/07/2024 Jocelyn William PA-C Family Medicine Office Visit Class 3 severe obesity due to excess calories without serious comorbidity with body mass index (BMI) of 40.0 to 44.9 in adult (HCC)

## 2024-05-13 ENCOUNTER — TELEPHONE (OUTPATIENT)
Dept: FAMILY MEDICINE CLINIC | Age: 44
End: 2024-05-13

## 2024-05-13 DIAGNOSIS — E55.9 VITAMIN D DEFICIENCY: ICD-10-CM

## 2024-05-13 DIAGNOSIS — R73.03 PRE-DIABETES: ICD-10-CM

## 2024-05-13 DIAGNOSIS — E88.819 INSULIN RESISTANCE: ICD-10-CM

## 2024-05-13 DIAGNOSIS — I10 HYPERTENSION, UNSPECIFIED TYPE: ICD-10-CM

## 2024-05-13 DIAGNOSIS — E66.01 CLASS 3 SEVERE OBESITY DUE TO EXCESS CALORIES WITHOUT SERIOUS COMORBIDITY WITH BODY MASS INDEX (BMI) OF 40.0 TO 44.9 IN ADULT (HCC): Primary | ICD-10-CM

## 2024-05-13 NOTE — TELEPHONE ENCOUNTER
Patient states Adipex is not fully covered by her insurance.    Patient is asking if there is something else you can prescribe her.    Please Advise.    # [phone]

## 2024-06-05 DIAGNOSIS — I10 HYPERTENSION, UNSPECIFIED TYPE: ICD-10-CM

## 2024-06-06 NOTE — TELEPHONE ENCOUNTER
Comments:     Last Office Visit (last PCP visit):   5/7/2024    Next Visit Date:  Future Appointments   Date Time Provider Department Center   11/7/2024  1:15 PM Jocelyn William PA-C [place]  [facility]       **If hasn't been seen in over a year OR hasn't followed up according to last diabetes/ADHD visit, make appointment for patient before sending refill to provider.    Rx requested:  Requested Prescriptions     Pending Prescriptions Disp Refills    losartan-hydroCHLOROthiazide (HYZAAR) 50-12.5 MG per tablet [Pharmacy Med Name: LOSARTAN-HCTZ 50-12.5MG TAB 50-12.5 Tablet] 30 tablet 10     Sig: TAKE 1 TABLET BY MOUTH EVERY DAY

## 2024-06-07 RX ORDER — LOSARTAN POTASSIUM AND HYDROCHLOROTHIAZIDE 12.5; 5 MG/1; MG/1
1 TABLET ORAL DAILY
Qty: 30 TABLET | Refills: 10 | Status: SHIPPED | OUTPATIENT
Start: 2024-06-07

## 2024-07-09 DIAGNOSIS — E03.9 HYPOTHYROIDISM, UNSPECIFIED TYPE: ICD-10-CM

## 2024-07-09 RX ORDER — LEVOTHYROXINE SODIUM 0.03 MG/1
TABLET ORAL
Qty: 30 TABLET | Refills: 10 | Status: SHIPPED | OUTPATIENT
Start: 2024-07-09

## 2024-07-09 NOTE — TELEPHONE ENCOUNTER
Comments:     Last Office Visit (last PCP visit):   5/7/2024    Next Visit Date:  Future Appointments   Date Time Provider Department Center   11/7/2024  1:15 PM Jocelyn William PA-C [place] [facility]       **If hasn't been seen in over a year OR hasn't followed up according to last diabetes/ADHD visit, make appointment for patient before sending refill to provider.    Rx requested:  Requested Prescriptions     Pending Prescriptions Disp Refills    levothyroxine (SYNTHROID) 25 MCG tablet [Pharmacy Med Name: LEVOTHYROXINE 25MCG TAB 25 Tablet] 30 tablet 10     Sig: TAKE 1 TABLET BY MOUTH DAILY ON AN EMPTY STOMACH 30 MINUTES BEFORE OTHER MEDICATIONS AND FOOD

## 2024-08-05 DIAGNOSIS — F43.0 ACUTE STRESS DISORDER: ICD-10-CM

## 2024-08-05 DIAGNOSIS — E88.819 INSULIN RESISTANCE: ICD-10-CM

## 2024-08-05 DIAGNOSIS — E66.01 MORBID OBESITY WITH BMI OF 40.0-44.9, ADULT (HCC): ICD-10-CM

## 2024-08-05 DIAGNOSIS — R73.03 PRE-DIABETES: ICD-10-CM

## 2024-08-06 RX ORDER — BUSPIRONE HYDROCHLORIDE 7.5 MG/1
TABLET ORAL
Qty: 60 TABLET | Refills: 10 | Status: SHIPPED | OUTPATIENT
Start: 2024-08-06

## 2024-08-06 NOTE — TELEPHONE ENCOUNTER
Comments:     Last Office Visit (last PCP visit):   5/7/2024    Next Visit Date:  Future Appointments   Date Time Provider Department Center   11/7/2024  1:15 PM Jocelyn William PA-C [place] [facility]       **If hasn't been seen in over a year OR hasn't followed up according to last diabetes/ADHD visit, make appointment for patient before sending refill to provider.    Rx requested:  Requested Prescriptions     Pending Prescriptions Disp Refills    busPIRone (BUSPAR) 7.5 MG tablet [Pharmacy Med Name: BUSPIRONE 7.5MG TABS 7.5 Tablet] 60 tablet 10     Sig: TAKE ONE (1) TABLET BY MOUTH TWICE DAILY FOR ANXIETY    metFORMIN (GLUCOPHAGE) 500 MG tablet [Pharmacy Med Name: METFORMIN 500 MG TABS 500 Tablet] 30 tablet 10     Sig: TAKE 1/2 TABLET BY MOUTH WITH BREAKFAST AND TAKE 1/2 TABLET WITH DINNER

## 2024-09-08 DIAGNOSIS — G47.00 INSOMNIA, UNSPECIFIED TYPE: ICD-10-CM

## 2024-09-09 ENCOUNTER — TELEPHONE (OUTPATIENT)
Dept: FAMILY MEDICINE CLINIC | Age: 44
End: 2024-09-09

## 2024-09-09 DIAGNOSIS — R73.03 PRE-DIABETES: Primary | ICD-10-CM

## 2024-09-09 RX ORDER — BLOOD-GLUCOSE METER
KIT MISCELLANEOUS
Qty: 1 KIT | Refills: 0 | Status: SHIPPED | OUTPATIENT
Start: 2024-09-09

## 2024-09-10 DIAGNOSIS — G47.00 INSOMNIA, UNSPECIFIED TYPE: ICD-10-CM

## 2024-09-10 RX ORDER — TRAZODONE HYDROCHLORIDE 100 MG/1
TABLET ORAL
Qty: 30 TABLET | Refills: 10 | Status: SHIPPED | OUTPATIENT
Start: 2024-09-10

## 2024-09-11 RX ORDER — TRAZODONE HYDROCHLORIDE 100 MG/1
TABLET ORAL
Qty: 30 TABLET | Refills: 10 | OUTPATIENT
Start: 2024-09-11

## 2024-09-24 ENCOUNTER — TELEPHONE (OUTPATIENT)
Dept: FAMILY MEDICINE CLINIC | Age: 44
End: 2024-09-24

## 2024-09-27 ENCOUNTER — TELEPHONE (OUTPATIENT)
Dept: FAMILY MEDICINE CLINIC | Age: 44
End: 2024-09-27

## 2024-09-27 NOTE — TELEPHONE ENCOUNTER
Patient states she needs test strips and lancets sent to [facility] in [place] on [address] for the onetouch verio flex.    Patient also states the mattress on her hospital bed is too thin and causing her a great deal of pain. She is asking if you can send a Rx for a more firm thicker mattress?    If not she is asking if the hospital bed can be returned and she will figure something else out.    Please Advise.    [phone]

## 2024-10-03 DIAGNOSIS — R53.82 CHRONIC FATIGUE: ICD-10-CM

## 2024-10-03 DIAGNOSIS — E55.9 VITAMIN D DEFICIENCY: ICD-10-CM

## 2024-10-04 NOTE — TELEPHONE ENCOUNTER
Comments:     Last Office Visit (last PCP visit):   5/7/2024    Next Visit Date:  Future Appointments   Date Time Provider Department Center   11/7/2024  1:15 PM Jocelyn William PA-C [place] [facility]       **If hasn't been seen in over a year OR hasn't followed up according to last diabetes/ADHD visit, make appointment for patient before sending refill to provider.    Rx requested:  Requested Prescriptions     Pending Prescriptions Disp Refills    VITAMIN D3 125 MCG (5000 UT) CAPS capsule [Pharmacy Med Name: VIT D-3 125MCG CAP (5000U) 125 MCG Capsule] 30 capsule 10     Sig: TAKE 1 CAPSULE BY MOUTH DAILY

## 2024-12-11 ENCOUNTER — OFFICE VISIT (OUTPATIENT)
Age: 44
End: 2024-12-11

## 2024-12-11 VITALS
RESPIRATION RATE: 16 BRPM | HEART RATE: 71 BPM | HEIGHT: 65 IN | SYSTOLIC BLOOD PRESSURE: 128 MMHG | OXYGEN SATURATION: 98 % | WEIGHT: 245 LBS | DIASTOLIC BLOOD PRESSURE: 78 MMHG | BODY MASS INDEX: 40.82 KG/M2

## 2024-12-11 DIAGNOSIS — Z90.710 S/P TAH (TOTAL ABDOMINAL HYSTERECTOMY): ICD-10-CM

## 2024-12-11 DIAGNOSIS — E03.9 HYPOTHYROIDISM, UNSPECIFIED TYPE: ICD-10-CM

## 2024-12-11 DIAGNOSIS — Z23 NEED FOR INFLUENZA VACCINATION: ICD-10-CM

## 2024-12-11 DIAGNOSIS — E66.813 CLASS 3 SEVERE OBESITY DUE TO EXCESS CALORIES WITHOUT SERIOUS COMORBIDITY WITH BODY MASS INDEX (BMI) OF 40.0 TO 44.9 IN ADULT: Primary | ICD-10-CM

## 2024-12-11 DIAGNOSIS — E88.819 INSULIN RESISTANCE: ICD-10-CM

## 2024-12-11 DIAGNOSIS — R73.03 PRE-DIABETES: ICD-10-CM

## 2024-12-11 DIAGNOSIS — Z85.42 HISTORY OF UTERINE CANCER: ICD-10-CM

## 2024-12-11 DIAGNOSIS — E66.01 CLASS 3 SEVERE OBESITY DUE TO EXCESS CALORIES WITHOUT SERIOUS COMORBIDITY WITH BODY MASS INDEX (BMI) OF 40.0 TO 44.9 IN ADULT: Primary | ICD-10-CM

## 2024-12-11 ASSESSMENT — ENCOUNTER SYMPTOMS
DIARRHEA: 0
NAUSEA: 0
ABDOMINAL PAIN: 0
ABDOMINAL DISTENTION: 0
BACK PAIN: 0
CHEST TIGHTNESS: 0
VOMITING: 0
SHORTNESS OF BREATH: 0
COUGH: 0
TROUBLE SWALLOWING: 0
WHEEZING: 0

## 2024-12-11 NOTE — PROGRESS NOTES
Vaccine Information Sheet, \"Influenza - Inactivated\"  given to Gabbie Casiano, or parent/legal guardian of  Gabbie Casiano and verbalized understanding.    Patient responses:    Have you ever had a reaction to a flu vaccine? No  Are you able to eat eggs without adverse effects?  Yes  Do you have any current illness?  No  Have you ever had Guillian [place] Syndrome?  No    Flu vaccine given per order. Please see immunization tab.          
(5/7/2024)    Housing Stability Vital Sign     Unable to Pay for Housing in the Last Year: Not on file     Number of Places Lived in the Last Year: Not on file     Unstable Housing in the Last Year: No     Family History   Problem Relation Age of Onset    Diabetes Mother     High Blood Pressure Mother     Kidney Disease Mother     Stroke Mother     Diabetes Father     High Blood Pressure Father     Diabetes Sister     High Blood Pressure Sister     Other Sister         Legally blind due to diabetes    COPD Brother     Sleep Apnea Brother     High Cholesterol Brother     Breast Cancer Maternal Aunt     Right Breast Cancer Paternal Aunt      Allergies   Allergen Reactions    Indomethacin Other (See Comments)     Current Outpatient Medications   Medication Sig Dispense Refill    VITAMIN D3 125 MCG (5000 UT) CAPS capsule TAKE 1 CAPSULE BY MOUTH DAILY 30 capsule 10    traZODone (DESYREL) 100 MG tablet TAKE 1 TABLET BY MOUTH EVERY NIGHT AT BEDTIME. 30 tablet 10    glucose monitoring kit Use to check blood sugars 4+ times daily.  Please fill what is on her insurance. Dx: type 2 diabetes 1 kit 0    busPIRone (BUSPAR) 7.5 MG tablet TAKE ONE (1) TABLET BY MOUTH TWICE DAILY FOR ANXIETY 60 tablet 10    metFORMIN (GLUCOPHAGE) 500 MG tablet TAKE 1/2 TABLET BY MOUTH WITH BREAKFAST AND TAKE 1/2 TABLET WITH DINNER 30 tablet 10    levothyroxine (SYNTHROID) 25 MCG tablet TAKE 1 TABLET BY MOUTH DAILY ON AN EMPTY STOMACH 30 MINUTES BEFORE OTHER MEDICATIONS AND FOOD 30 tablet 10    losartan-hydroCHLOROthiazide (HYZAAR) 50-12.5 MG per tablet TAKE 1 TABLET BY MOUTH EVERY DAY 30 tablet 10    TRULICITY 0.75 MG/0.5ML SOPN SC injection INJECT 0.75MG SUBCUTANEOUSLY ONCE WEEKLY 2 mL 10    Alcohol Swabs (EASY TOUCH ALCOHOL PREP MEDIUM) 70 % PADS USE AS DIRECTED 200 each 5    FreeStyle Lancets MISC USE AS DIRECTED TO TEST BLOOD SUGAR DAILY 100 each 2    acetaminophen (TYLENOL) 500 MG tablet Take 1 tablet by mouth every 6 hours as needed for Pain

## 2024-12-16 DIAGNOSIS — R73.03 PRE-DIABETES: ICD-10-CM

## 2024-12-16 RX ORDER — LANCETS 28 GAUGE
EACH MISCELLANEOUS
Qty: 100 EACH | Refills: 2 | Status: SHIPPED | OUTPATIENT
Start: 2024-12-16

## 2024-12-16 NOTE — TELEPHONE ENCOUNTER
Comments:     Last Office Visit (last PCP visit):   12/11/2024    Next Visit Date:  Future Appointments   Date Time Provider Department Center   3/11/2025 11:15 AM [facility]         Rx requested:  Requested Prescriptions     Pending Prescriptions Disp Refills    FreeStyle Lancets MISC [Pharmacy Med Name: LANCETS FREESTYLE 100'S 28G] 100 each 2     Sig: USE AS DIRECTED TO TEST BLOOD SUGAR DAILY

## 2024-12-24 NOTE — TELEPHONE ENCOUNTER
Rx request   Requested Prescriptions     Pending Prescriptions Disp Refills    FreeStyle Lancets MISC [Pharmacy Med Name: FREESTYLE 28G LANCETS 100CT 28 GAUGE EACH] 100 each 10     Sig: USE TO TEST BLOOD SUGAR ONCE DAILY AS DIRECTED     LOV 3/15/2023  Last refill 2/27/23  Next Visit Date:  No future appointments. Need for prophylactic measure

## 2025-01-02 DIAGNOSIS — R06.02 SOB (SHORTNESS OF BREATH) ON EXERTION: ICD-10-CM

## 2025-01-03 RX ORDER — ALBUTEROL SULFATE 90 UG/1
INHALANT RESPIRATORY (INHALATION)
Qty: 8.5 G | Refills: 10 | Status: SHIPPED | OUTPATIENT
Start: 2025-01-03

## 2025-01-03 NOTE — TELEPHONE ENCOUNTER
Comments:     Last Office Visit (last PCP visit):   12/11/2024    Next Visit Date:  Future Appointments   Date Time Provider Department Center   3/11/2025 11:15 AM Jocelyn William PA-C [facility] DEP       **If hasn't been seen in over a year OR hasn't followed up according to last diabetes/ADHD visit, make appointment for patient before sending refill to provider.    Rx requested:  Requested Prescriptions     Pending Prescriptions Disp Refills    albuterol sulfate HFA (PROVENTIL;VENTOLIN;PROAIR) 108 (90 Base) MCG/ACT inhaler [Pharmacy Med Name: ALBUTEROL HFA *PROA* 90MCG 108 (90 BAS Aerosol] 8.5 g 10     Sig: INHALE TWO (2) PUFFS BY MOUTH EVERY 4 HOURS AS NEEDED FOR SHORTNESS OF BREATH AND FOR WHEEZING

## 2025-01-09 ENCOUNTER — TELEPHONE (OUTPATIENT)
Age: 45
End: 2025-01-09

## 2025-01-09 NOTE — TELEPHONE ENCOUNTER
[facility] states patient needs test strips and lancets for a One Touch Delica sent [facility] [employer].    Also Alcohol Prep Pads need a PA    Please advise    Callback [phone]

## 2025-01-10 DIAGNOSIS — R73.03 PRE-DIABETES: ICD-10-CM

## 2025-01-10 RX ORDER — BLOOD-GLUCOSE METER
KIT MISCELLANEOUS
Qty: 100 STRIP | Refills: 3 | Status: SHIPPED | OUTPATIENT
Start: 2025-01-10

## 2025-01-10 RX ORDER — LANCETS 28 GAUGE
EACH MISCELLANEOUS
Qty: 100 EACH | Refills: 2 | Status: SHIPPED | OUTPATIENT
Start: 2025-01-10

## 2025-01-10 NOTE — TELEPHONE ENCOUNTER
Comments: 4/12/24    Last Office Visit (last PCP visit):   12/11/2024    Next Visit Date:  Future Appointments   Date Time Provider Department Center   3/11/2025 11:15 AM Jocelyn William PA-C [facility] DEP       **If hasn't been seen in over a year OR hasn't followed up according to last diabetes/ADHD visit, make appointment for patient before sending refill to provider.    Rx requested:  Requested Prescriptions     Pending Prescriptions Disp Refills    FreeStyle Lancets MISC 100 each 2     Sig: USE AS DIRECTED TO TEST BLOOD SUGAR DAILY    blood glucose test strips (FREESTYLE LITE) strip 100 strip 3     Sig: USE TO TEST BLOOD SUGAR TWICE DAILY AND AS NEEDED AS DIRECTED FOR SYMPTOMS OF IRREGULAR BLOOD SUGAR

## 2025-01-30 DIAGNOSIS — E66.01 CLASS 3 SEVERE OBESITY DUE TO EXCESS CALORIES WITHOUT SERIOUS COMORBIDITY WITH BODY MASS INDEX (BMI) OF 40.0 TO 44.9 IN ADULT: ICD-10-CM

## 2025-01-30 DIAGNOSIS — E88.819 INSULIN RESISTANCE: ICD-10-CM

## 2025-01-30 DIAGNOSIS — E03.9 HYPOTHYROIDISM, UNSPECIFIED TYPE: ICD-10-CM

## 2025-01-30 DIAGNOSIS — Z13.220 LIPID SCREENING: ICD-10-CM

## 2025-01-30 DIAGNOSIS — R73.03 PRE-DIABETES: ICD-10-CM

## 2025-01-30 DIAGNOSIS — E55.9 VITAMIN D DEFICIENCY: ICD-10-CM

## 2025-01-30 DIAGNOSIS — E66.813 CLASS 3 SEVERE OBESITY DUE TO EXCESS CALORIES WITHOUT SERIOUS COMORBIDITY WITH BODY MASS INDEX (BMI) OF 40.0 TO 44.9 IN ADULT: ICD-10-CM

## 2025-01-30 DIAGNOSIS — I10 HYPERTENSION, UNSPECIFIED TYPE: ICD-10-CM

## 2025-01-30 LAB
ALBUMIN SERPL-MCNC: 4.6 G/DL (ref 3.5–4.6)
ALP SERPL-CCNC: 110 U/L (ref 40–130)
ALT SERPL-CCNC: 31 U/L (ref 0–33)
ANION GAP SERPL CALCULATED.3IONS-SCNC: 10 MEQ/L (ref 9–15)
AST SERPL-CCNC: 23 U/L (ref 0–35)
BILIRUB SERPL-MCNC: <0.2 MG/DL (ref 0.2–0.7)
BUN SERPL-MCNC: 13 MG/DL (ref 6–20)
CALCIUM SERPL-MCNC: 9.9 MG/DL (ref 8.5–9.9)
CHLORIDE SERPL-SCNC: 104 MEQ/L (ref 95–107)
CHOLEST SERPL-MCNC: 96 MG/DL (ref 0–199)
CO2 SERPL-SCNC: 29 MEQ/L (ref 20–31)
CREAT SERPL-MCNC: 0.82 MG/DL (ref 0.5–0.9)
ERYTHROCYTE [DISTWIDTH] IN BLOOD BY AUTOMATED COUNT: 12.7 % (ref 11.5–14.5)
ESTIMATED AVERAGE GLUCOSE: 131 MG/DL
GLOBULIN SER CALC-MCNC: 2.6 G/DL (ref 2.3–3.5)
GLUCOSE SERPL-MCNC: 99 MG/DL (ref 70–99)
HBA1C MFR BLD: 6.2 % (ref 4–6)
HCT VFR BLD AUTO: 45 % (ref 37–47)
HDLC SERPL-MCNC: 44 MG/DL (ref 40–59)
HGB BLD-MCNC: 15.4 G/DL (ref 12–16)
LDL CHOLESTEROL: 38 MG/DL (ref 0–129)
MCH RBC QN AUTO: 31.6 PG (ref 27–31.3)
MCHC RBC AUTO-ENTMCNC: 34.2 % (ref 33–37)
MCV RBC AUTO: 92.2 FL (ref 79.4–94.8)
PLATELET # BLD AUTO: 238 K/UL (ref 130–400)
POTASSIUM SERPL-SCNC: 4.2 MEQ/L (ref 3.4–4.9)
PROT SERPL-MCNC: 7.2 G/DL (ref 6.3–8)
RBC # BLD AUTO: 4.88 M/UL (ref 4.2–5.4)
SODIUM SERPL-SCNC: 143 MEQ/L (ref 135–144)
T4 FREE SERPL-MCNC: 1.09 NG/DL (ref 0.84–1.68)
TRIGLYCERIDE, FASTING: 70 MG/DL (ref 0–150)
TSH SERPL-MCNC: 1.44 UIU/ML (ref 0.44–3.86)
VITAMIN D 25-HYDROXY: 25 NG/ML (ref 30–100)
WBC # BLD AUTO: 9.5 K/UL (ref 4.8–10.8)

## 2025-01-31 LAB — TSI SER-ACNC: <0.1 IU/L

## 2025-02-01 LAB
INSULIN FREE SERPL-ACNC: 65 UIU/ML (ref 3–25)
INSULIN SERPL-ACNC: 81 UIU/ML (ref 3–25)

## 2025-02-04 LAB — THYROPEROXIDASE IGG SERPL-ACNC: <4 IU/ML (ref 0–25)

## 2025-03-11 ENCOUNTER — OFFICE VISIT (OUTPATIENT)
Age: 45
End: 2025-03-11
Payer: COMMERCIAL

## 2025-03-11 VITALS
HEART RATE: 67 BPM | DIASTOLIC BLOOD PRESSURE: 82 MMHG | HEIGHT: 65 IN | RESPIRATION RATE: 16 BRPM | OXYGEN SATURATION: 99 % | WEIGHT: 252 LBS | BODY MASS INDEX: 41.99 KG/M2 | SYSTOLIC BLOOD PRESSURE: 140 MMHG

## 2025-03-11 DIAGNOSIS — R73.03 PRE-DIABETES: Primary | ICD-10-CM

## 2025-03-11 DIAGNOSIS — E66.813 CLASS 3 SEVERE OBESITY DUE TO EXCESS CALORIES WITHOUT SERIOUS COMORBIDITY WITH BODY MASS INDEX (BMI) OF 40.0 TO 44.9 IN ADULT: ICD-10-CM

## 2025-03-11 DIAGNOSIS — Z90.710 S/P TAH (TOTAL ABDOMINAL HYSTERECTOMY): ICD-10-CM

## 2025-03-11 DIAGNOSIS — E88.819 INSULIN RESISTANCE: ICD-10-CM

## 2025-03-11 DIAGNOSIS — E66.01 CLASS 3 SEVERE OBESITY DUE TO EXCESS CALORIES WITHOUT SERIOUS COMORBIDITY WITH BODY MASS INDEX (BMI) OF 40.0 TO 44.9 IN ADULT: ICD-10-CM

## 2025-03-11 DIAGNOSIS — E03.9 HYPOTHYROIDISM, UNSPECIFIED TYPE: ICD-10-CM

## 2025-03-11 DIAGNOSIS — I10 HYPERTENSION, UNSPECIFIED TYPE: ICD-10-CM

## 2025-03-11 PROCEDURE — 1036F TOBACCO NON-USER: CPT | Performed by: PHYSICIAN ASSISTANT

## 2025-03-11 PROCEDURE — G2211 COMPLEX E/M VISIT ADD ON: HCPCS | Performed by: PHYSICIAN ASSISTANT

## 2025-03-11 PROCEDURE — 3079F DIAST BP 80-89 MM HG: CPT | Performed by: PHYSICIAN ASSISTANT

## 2025-03-11 PROCEDURE — G8417 CALC BMI ABV UP PARAM F/U: HCPCS | Performed by: PHYSICIAN ASSISTANT

## 2025-03-11 PROCEDURE — 3077F SYST BP >= 140 MM HG: CPT | Performed by: PHYSICIAN ASSISTANT

## 2025-03-11 PROCEDURE — 99213 OFFICE O/P EST LOW 20 MIN: CPT | Performed by: PHYSICIAN ASSISTANT

## 2025-03-11 PROCEDURE — G8427 DOCREV CUR MEDS BY ELIG CLIN: HCPCS | Performed by: PHYSICIAN ASSISTANT

## 2025-03-11 RX ORDER — KETOROLAC TROMETHAMINE 30 MG/ML
INJECTION, SOLUTION INTRAMUSCULAR; INTRAVENOUS
Qty: 1 EACH | Refills: 0 | Status: SHIPPED | OUTPATIENT
Start: 2025-03-11

## 2025-03-11 RX ORDER — AVOBENZONE, HOMOSALATE, OCTISALATE, OCTOCRYLENE 30; 40; 45; 26 MG/ML; MG/ML; MG/ML; MG/ML
1 CREAM TOPICAL 2 TIMES DAILY
Qty: 300 EACH | Refills: 1 | Status: SHIPPED | OUTPATIENT
Start: 2025-03-11

## 2025-03-11 SDOH — ECONOMIC STABILITY: FOOD INSECURITY: WITHIN THE PAST 12 MONTHS, THE FOOD YOU BOUGHT JUST DIDN'T LAST AND YOU DIDN'T HAVE MONEY TO GET MORE.: NEVER TRUE

## 2025-03-11 SDOH — ECONOMIC STABILITY: FOOD INSECURITY: WITHIN THE PAST 12 MONTHS, YOU WORRIED THAT YOUR FOOD WOULD RUN OUT BEFORE YOU GOT MONEY TO BUY MORE.: NEVER TRUE

## 2025-03-11 ASSESSMENT — ENCOUNTER SYMPTOMS
WHEEZING: 0
NAUSEA: 0
ABDOMINAL DISTENTION: 0
VOMITING: 0
SHORTNESS OF BREATH: 0
ABDOMINAL PAIN: 0
TROUBLE SWALLOWING: 0
CHEST TIGHTNESS: 0
COUGH: 0
BACK PAIN: 0
DIARRHEA: 0

## 2025-03-11 ASSESSMENT — PATIENT HEALTH QUESTIONNAIRE - PHQ9
SUM OF ALL RESPONSES TO PHQ QUESTIONS 1-9: 0
2. FEELING DOWN, DEPRESSED OR HOPELESS: NOT AT ALL
SUM OF ALL RESPONSES TO PHQ QUESTIONS 1-9: 0
1. LITTLE INTEREST OR PLEASURE IN DOING THINGS: NOT AT ALL
SUM OF ALL RESPONSES TO PHQ QUESTIONS 1-9: 0
SUM OF ALL RESPONSES TO PHQ QUESTIONS 1-9: 0

## 2025-03-11 NOTE — PROGRESS NOTES
Subjective  Gabbieawa Casiano, 44 y.o. female presents today with:  Chief Complaint   Patient presents with    Follow-up     3 month follow up     Vaginitis     Believes she has a yeast infection       HPI  IN the office for follow up.  Last OV with me: 12/11/24.    Overall, doing well.   Complaint with her medications.    Trying to eat better.  Walking/trying to increase activity.  No falls.  Sleeping well.     Recently had follow up with Dr. Yolie Ayon at [facility].    History of adenocarcinoma of uterus.  S/p ALEXSANDRA.    Doing well, no complications.  Follow up in April (every 6 months).    Mood is stable.     Results for orders placed or performed in visit on 01/30/25   Vitamin D 25 Hydroxy   Result Value Ref Range    Vit D, 25-Hydroxy 25.0 (L) 30.0 - 100.0 ng/mL   Thyroid Stimulating Immunoglobulin   Result Value Ref Range    TSI <0.10 <=0.54 IU/L   Thyroid Peroxidase Antibody   Result Value Ref Range    Thyroid Peroxidase (TPO) Abs <4.0 0.0 - 25.0 IU/mL   Lipid, Fasting   Result Value Ref Range    Cholesterol, Fasting 96 0 - 199 mg/dL    Triglyceride, Fasting 70 0 - 150 mg/dL    HDL 44 40 - 59 mg/dL    LDL Cholesterol 38 0 - 129 mg/dL   Hemoglobin A1C   Result Value Ref Range    Hemoglobin A1C 6.2 (H) 4.0 - 6.0 %    Estimated Avg Glucose 131 mg/dL   Insulin Free & Total   Result Value Ref Range    Insulin, Free 65 (H) 3 - 25 uIU/mL    Insulin 81 (H) 3 - 25 uIU/mL   T4, Free   Result Value Ref Range    T4 Free 1.09 0.84 - 1.68 ng/dL   TSH   Result Value Ref Range    TSH 1.440 0.440 - 3.860 uIU/mL   Comprehensive Metabolic Panel   Result Value Ref Range    Sodium 143 135 - 144 mEq/L    Potassium 4.2 3.4 - 4.9 mEq/L    Chloride 104 95 - 107 mEq/L    CO2 29 20 - 31 mEq/L    Anion Gap 10 9 - 15 mEq/L    Glucose 99 70 - 99 mg/dL    BUN 13 6 - 20 mg/dL    Creatinine 0.82 0.50 - 0.90 mg/dL    Est, Glom Filt Rate >90.0 >60    Calcium 9.9 8.5 - 9.9 mg/dL    Total Protein 7.2 6.3 - 8.0 g/dL    Albumin 4.6 3.5 - 4.6

## 2025-03-12 NOTE — PROGRESS NOTES
Called and spoke with old  who transferred me to the pts new  who did not answer VM was left for a return call to get pt scheduled

## 2025-04-06 DIAGNOSIS — R73.03 PRE-DIABETES: ICD-10-CM

## 2025-04-07 RX ORDER — KETOROLAC TROMETHAMINE 30 MG/ML
INJECTION, SOLUTION INTRAMUSCULAR; INTRAVENOUS
Qty: 1 EACH | Refills: 3 | Status: SHIPPED | OUTPATIENT
Start: 2025-04-07

## 2025-05-01 DIAGNOSIS — R73.03 PRE-DIABETES: ICD-10-CM

## 2025-05-01 DIAGNOSIS — I10 HYPERTENSION, UNSPECIFIED TYPE: ICD-10-CM

## 2025-05-02 RX ORDER — LANCETS 28 GAUGE
EACH MISCELLANEOUS
Qty: 100 EACH | Refills: 11 | Status: SHIPPED | OUTPATIENT
Start: 2025-05-02

## 2025-05-02 RX ORDER — LOSARTAN POTASSIUM AND HYDROCHLOROTHIAZIDE 12.5; 5 MG/1; MG/1
1 TABLET ORAL DAILY
Qty: 30 TABLET | Refills: 11 | Status: SHIPPED | OUTPATIENT
Start: 2025-05-02

## 2025-05-02 RX ORDER — BLOOD SUGAR DIAGNOSTIC
STRIP MISCELLANEOUS
Qty: 100 EACH | Refills: 11 | Status: SHIPPED | OUTPATIENT
Start: 2025-05-02

## 2025-05-02 NOTE — TELEPHONE ENCOUNTER
Last Office Visit (last PCP visit):   3/11/2025    Next Visit Date:  Future Appointments   Date Time Provider Department Center   6/12/2025  1:00 PM Jocelyn William PA-C [facility] DEP       **If hasn't been seen in over a year OR hasn't followed up according to last diabetes/ADHD visit, make appointment for patient before sending refill to provider.    Rx requested:  Requested Prescriptions     Pending Prescriptions Disp Refills    blood glucose test strips (ONETOUCH ULTRA) strip [Pharmacy Med Name: ONE TOUCH ULT 100CT TEST ST IP]  11     Sig: USE TO TEST BLOOD SUGAR TWICE DAILY AND AS NEEDED AS DIRECTED FOR SYMPTOMS OF IRREGULAR BLOOD SUGAR    FreeStyle Lancets MISC [Pharmacy Med Name: FREESTYLE 28G LANCETS 100CT 28 GAUGE EACH] 100 each 11     Sig: USE AS DIRECTED TO TEST BLOOD SUGAR    losartan-hydroCHLOROthiazide (HYZAAR) 50-12.5 MG per tablet [Pharmacy Med Name: LOSARTAN-HCTZ 50-12.5MG TAB 50-12.5 Tablet] 30 tablet 11     Sig: TAKE 1 TABLET BY MOUTH EVERY DAY

## 2025-05-20 ENCOUNTER — TELEPHONE (OUTPATIENT)
Age: 45
End: 2025-05-20

## 2025-05-20 NOTE — TELEPHONE ENCOUNTER
Patient states at night time her sugars have been dropping very low like 55-60.  What should she do?    Please advise    Callback [phone]

## 2025-05-21 NOTE — TELEPHONE ENCOUNTER
Is she eating anything prior to going to bed? A snack?   Is this morning sugars or in the middle of the night?

## 2025-06-02 DIAGNOSIS — E03.9 HYPOTHYROIDISM, UNSPECIFIED TYPE: ICD-10-CM

## 2025-06-03 NOTE — TELEPHONE ENCOUNTER
Comments:     Last Office Visit (last PCP visit):   3/11/2025    Next Visit Date:  No future appointments.      Rx requested:  Requested Prescriptions     Pending Prescriptions Disp Refills    levothyroxine (SYNTHROID) 25 MCG tablet [Pharmacy Med Name: LEVOTHYROXINE 25MCG TAB 25 Tablet] 30 tablet 11     Sig: Take 1 tablet by mouth Daily

## 2025-06-04 RX ORDER — LEVOTHYROXINE SODIUM 25 UG/1
25 TABLET ORAL DAILY
Qty: 30 TABLET | Refills: 11 | Status: SHIPPED | OUTPATIENT
Start: 2025-06-04

## 2025-06-23 NOTE — PROGRESS NOTES
The following individual(s) verbally consented to be recorded using ambient AI listening technology and understand that they can each withdraw their consent to this listening technology at any point by asking the clinician to turn off or pause the recording:    Patient name: Mason Puri  Additional names:     Vaccine Information Sheet, \"Influenza - Inactivated\"  given to Mandy Sanchez, or parent/legal guardian of  Mandy Sanchez and verbalized understanding. Patient responses:    Have you ever had a reaction to a flu vaccine? No  Are you able to eat eggs without adverse effects? Yes  Do you have any current illness? No  Have you ever had Guillian [place] Syndrome? No    Flu vaccine given per order. Please see immunization tab.

## 2025-07-01 DIAGNOSIS — F43.0 ACUTE STRESS DISORDER: ICD-10-CM

## 2025-07-03 RX ORDER — BUSPIRONE HYDROCHLORIDE 7.5 MG/1
7.5 TABLET ORAL 2 TIMES DAILY
Qty: 60 TABLET | Refills: 11 | Status: SHIPPED | OUTPATIENT
Start: 2025-07-03

## 2025-07-03 NOTE — TELEPHONE ENCOUNTER
Comments:     Last Office Visit (last PCP visit):   3/11/2025    Next Visit Date:  No future appointments.      Rx requested:  Requested Prescriptions     Pending Prescriptions Disp Refills    busPIRone (BUSPAR) 7.5 MG tablet [Pharmacy Med Name: BUSPIRONE 7.5MG TABS 7.5 Tablet] 60 tablet 11     Sig: Take 1 tablet by mouth 2 times daily

## 2025-07-31 DIAGNOSIS — G47.00 INSOMNIA, UNSPECIFIED TYPE: ICD-10-CM

## 2025-08-01 RX ORDER — TRAZODONE HYDROCHLORIDE 100 MG/1
100 TABLET ORAL NIGHTLY
Qty: 30 TABLET | Refills: 11 | Status: SHIPPED | OUTPATIENT
Start: 2025-08-01

## 2025-08-01 NOTE — TELEPHONE ENCOUNTER
Comments:     Last Office Visit (last PCP visit):   3/11/2025    Next Visit Date:  No future appointments.    **If hasn't been seen in over a year OR hasn't followed up according to last diabetes/ADHD visit, make appointment for patient before sending refill to provider.    Rx requested:  Requested Prescriptions     Pending Prescriptions Disp Refills    traZODone (DESYREL) 100 MG tablet [Pharmacy Med Name: TRAZODONE 100MG TABLET 100 Tablet] 30 tablet 11     Sig: TAKE 1 TABLET BY MOUTH EVERY NIGHT AT BEDTIME.

## 2025-08-23 DIAGNOSIS — E88.819 INSULIN RESISTANCE: ICD-10-CM

## 2025-08-23 DIAGNOSIS — R73.03 PRE-DIABETES: ICD-10-CM

## 2025-08-26 RX ORDER — HYDROCHLOROTHIAZIDE 12.5 MG/1
CAPSULE ORAL
Qty: 6 EACH | Refills: 2 | Status: SHIPPED | OUTPATIENT
Start: 2025-08-26

## 2025-08-29 DIAGNOSIS — E55.9 VITAMIN D DEFICIENCY: ICD-10-CM

## 2025-08-29 DIAGNOSIS — R53.82 CHRONIC FATIGUE: ICD-10-CM

## (undated) DEVICE — WARMER SCP 2 ANTIFOG LAP DISP

## (undated) DEVICE — COVER LT HNDL BLU PLAS

## (undated) DEVICE — LITHOTOMY DRAPE WITH FLUID CONTROL POUCH: Brand: CONVERTORS

## (undated) DEVICE — CATHETER,URETHRAL,VINYL,FEMALE,6",14FR: Brand: MEDLINE

## (undated) DEVICE — PACK,BASIC: Brand: MEDLINE

## (undated) DEVICE — GAUZE,SPONGE,4"X4",16PLY,XRAY,STRL,LF: Brand: MEDLINE

## (undated) DEVICE — SKIN PREP TRAY 4 COMPARTM TRAY: Brand: MEDLINE INDUSTRIES, INC.

## (undated) DEVICE — GLOVE SURG L12IN SZ 65FNGR THK94MIL TRNSLUC YEL LTX

## (undated) DEVICE — DBD-PACK,LITHOTOMY,PK II,AURORA: Brand: MEDLINE

## (undated) DEVICE — LINER PAD CONTOUR SUPER PEACH 7X14IN

## (undated) DEVICE — SET FLD CTRL SYS INFLO AND OUTFLO TB AQUILEX

## (undated) DEVICE — GOWN,AURORA,NONREINFORCED,LARGE: Brand: MEDLINE

## (undated) DEVICE — KIT CANSTR VAC TANTEM TB FOR AQUILEX FLD CTRL SYS

## (undated) DEVICE — SET ENDOSCP SEAL HYSTEROSCOPE RIG OUTFLO CHN DISP MYOSURE

## (undated) DEVICE — PAD,NON-ADHERENT,3X8,STERILE,LF,1/PK: Brand: MEDLINE

## (undated) DEVICE — TOWEL,OR,DSP,ST,BLUE,STD,4/PK,20PK/CS: Brand: MEDLINE